# Patient Record
Sex: FEMALE | Race: BLACK OR AFRICAN AMERICAN | Employment: FULL TIME | URBAN - METROPOLITAN AREA
[De-identification: names, ages, dates, MRNs, and addresses within clinical notes are randomized per-mention and may not be internally consistent; named-entity substitution may affect disease eponyms.]

---

## 2018-06-15 ENCOUNTER — APPOINTMENT (OUTPATIENT)
Dept: GENERAL RADIOLOGY | Facility: CLINIC | Age: 23
End: 2018-06-15
Attending: EMERGENCY MEDICINE

## 2018-06-15 ENCOUNTER — APPOINTMENT (OUTPATIENT)
Dept: CT IMAGING | Facility: CLINIC | Age: 23
End: 2018-06-15
Attending: EMERGENCY MEDICINE

## 2018-06-15 ENCOUNTER — HOSPITAL ENCOUNTER (EMERGENCY)
Facility: CLINIC | Age: 23
Discharge: HOME | End: 2018-06-15
Attending: EMERGENCY MEDICINE | Admitting: EMERGENCY MEDICINE

## 2018-06-15 VITALS
DIASTOLIC BLOOD PRESSURE: 94 MMHG | TEMPERATURE: 98.5 F | SYSTOLIC BLOOD PRESSURE: 112 MMHG | HEART RATE: 105 BPM | RESPIRATION RATE: 20 BRPM | OXYGEN SATURATION: 98 %

## 2018-06-15 DIAGNOSIS — S09.90XA CLOSED HEAD INJURY, INITIAL ENCOUNTER: ICD-10-CM
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
## 2018-06-15 DIAGNOSIS — S61.259A HUMAN BITE OF FINGER, INITIAL ENCOUNTER: ICD-10-CM

## 2018-06-15 DIAGNOSIS — S00.11XA PERIORBITAL ECCHYMOSIS, RIGHT, INITIAL ENCOUNTER: ICD-10-CM

## 2018-06-15 DIAGNOSIS — S06.0X0A CONCUSSION WITHOUT LOSS OF CONSCIOUSNESS, INITIAL ENCOUNTER: ICD-10-CM
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
## 2018-06-15 LAB
ALBUMIN UR-MCNC: NEGATIVE MG/DL
APPEARANCE UR: CLEAR
BILIRUB UR QL STRIP: NEGATIVE
COLOR UR AUTO: YELLOW
GLUCOSE UR STRIP-MCNC: NEGATIVE MG/DL
HCG UR QL: NEGATIVE
HGB UR QL STRIP: NEGATIVE
KETONES UR STRIP-MCNC: NEGATIVE MG/DL
LEUKOCYTE ESTERASE UR QL STRIP: NEGATIVE
MUCOUS THREADS #/AREA URNS LPF: PRESENT /LPF
NITRATE UR QL: NEGATIVE
PH UR STRIP: 5 PH (ref 5–7)
RBC #/AREA URNS AUTO: <1 /HPF (ref 0–2)
SOURCE: ABNORMAL
SP GR UR STRIP: 1.02 (ref 1–1.03)
SQUAMOUS #/AREA URNS AUTO: 2 /HPF (ref 0–1)
UROBILINOGEN UR STRIP-MCNC: 2 MG/DL (ref 0–2)
WBC #/AREA URNS AUTO: 1 /HPF (ref 0–5)

## 2018-06-15 PROCEDURE — 25000128 H RX IP 250 OP 636: Performed by: EMERGENCY MEDICINE

## 2018-06-15 PROCEDURE — 70450 CT HEAD/BRAIN W/O DYE: CPT | Mod: XS

## 2018-06-15 PROCEDURE — 90715 TDAP VACCINE 7 YRS/> IM: CPT | Performed by: EMERGENCY MEDICINE

## 2018-06-15 PROCEDURE — 25000132 ZZH RX MED GY IP 250 OP 250 PS 637: Performed by: EMERGENCY MEDICINE

## 2018-06-15 PROCEDURE — 90471 IMMUNIZATION ADMIN: CPT

## 2018-06-15 PROCEDURE — 81001 URINALYSIS AUTO W/SCOPE: CPT | Performed by: EMERGENCY MEDICINE

## 2018-06-15 PROCEDURE — 70486 CT MAXILLOFACIAL W/O DYE: CPT

## 2018-06-15 PROCEDURE — 71046 X-RAY EXAM CHEST 2 VIEWS: CPT

## 2018-06-15 PROCEDURE — 81025 URINE PREGNANCY TEST: CPT | Performed by: EMERGENCY MEDICINE

## 2018-06-15 PROCEDURE — 99285 EMERGENCY DEPT VISIT HI MDM: CPT | Mod: 25

## 2018-06-15 RX ORDER — IBUPROFEN 600 MG/1
600 TABLET, FILM COATED ORAL ONCE
Status: COMPLETED | OUTPATIENT
Start: 2018-06-15 | End: 2018-06-15

## 2018-06-15 RX ORDER — ACETAMINOPHEN 325 MG/1
650 TABLET ORAL ONCE
Status: COMPLETED | OUTPATIENT
Start: 2018-06-15 | End: 2018-06-15

## 2018-06-15 RX ADMIN — CLOSTRIDIUM TETANI TOXOID ANTIGEN (FORMALDEHYDE INACTIVATED), CORYNEBACTERIUM DIPHTHERIAE TOXOID ANTIGEN (FORMALDEHYDE INACTIVATED), BORDETELLA PERTUSSIS TOXOID ANTIGEN (GLUTARALDEHYDE INACTIVATED), BORDETELLA PERTUSSIS FILAMENTOUS HEMAGGLUTININ ANTIGEN (FORMALDEHYDE INACTIVATED), BORDETELLA PERTUSSIS PERTACTIN ANTIGEN, AND BORDETELLA PERTUSSIS FIMBRIAE 2/3 ANTIGEN 0.5 ML: 5; 2; 2.5; 5; 3; 5 INJECTION, SUSPENSION INTRAMUSCULAR at 18:06

## 2018-06-15 RX ADMIN — IBUPROFEN 600 MG: 600 TABLET ORAL at 15:50

## 2018-06-15 RX ADMIN — ACETAMINOPHEN 650 MG: 325 TABLET, FILM COATED ORAL at 15:50

## 2018-06-15 ASSESSMENT — ENCOUNTER SYMPTOMS
ROS SKIN COMMENTS: ECCHYMOSIS
WOUND: 1
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
## 2018-06-15 NOTE — DISCHARGE INSTRUCTIONS
Discharge Instructions  Head Injury    You have been seen today for a head injury. Your evaluation included a history and physical examination. You may have had a CT (CAT) scan performed, though most head injuries do not require a scan. Based on this evaluation, your provider today does not feel that your head injury is serious.    Generally, every Emergency Department visit should have a follow-up clinic visit with either a primary or a specialty clinic/provider. Please follow-up as instructed by your emergency provider today.  Return to the Emergency Department if:    You are confused or you are not acting right.    Your headache gets worse or you start to have a really bad headache even with your recommended treatment plan.    You vomit (throw up) more than once.    You have a seizure.    You have trouble walking.    You have weakness or paralysis (cannot move) in an arm or a leg.    You have blood or fluid coming from your ears or nose.    You have new symptoms or anything that worries you.    Sleeping:  It is okay for you to sleep, but someone should wake you up if instructed by your provider, and someone should check on you at your usual time to wake up.     Activity:    Do not drive for at least 24 hours.    Do not drive if you have dizzy spells or trouble concentrating, or remembering things.    Do not return to any contact sports until cleared by your regular provider.     MORE INFORMATION:    Concussion:  A concussion is a minor head injury that may cause temporary problems with the way the brain works. Although concussions are important, they are generally not an emergency or a reason that a person needs to be hospitalized. Some concussion symptoms include confusion, amnesia (forgetful), nausea (sick to your stomach) and vomiting (throwing up), dizziness, fatigue, memory or concentration problems, irritability and sleep problems. For most people, concussions are mild and temporary but some will have more  severe and persistent symptoms that require on-going care and treatment.  CT Scans: Your evaluation today may have included a CT scan (CAT scan) to look for things like bleeding or a skull fracture (broken bone).  CT scans involve radiation and too many CT scans can cause serious health problems like cancer, especially in children.  Because of this, your provider may not have ordered a CT scan today if they think you are at low risk for a serious or life threatening problem.    If you were given a prescription for medicine here today, be sure to read all of the information (including the package insert) that comes with your prescription.  This will include important information about the medicine, its side effects, and any warnings that you need to know about.  The pharmacist who fills the prescription can provide more information and answer questions you may have about the medicine.  If you have questions or concerns that the pharmacist cannot address, please call or return to the Emergency Department.     Remember that you can always come back to the Emergency Department if you are not able to see your regular provider in the amount of time listed above, if you get any new symptoms, or if there is anything that worries you.    Discharge Instructions  Concussion    You were seen today for signs of a concussion.  The symptoms will vary, depending on the nature of your injury and your health. You may have: headache, confusion, nausea (feel sick to your stomach), vomiting (throwing up) and problems with memory, concentrating, or sleep. You may feel dizzy, irritable, and tired. Children and teens may need help from their parents, teachers, and coaches to watch for symptoms as they recover.    Generally, every Emergency Department visit should have a follow-up clinic visit with either a primary or a specialty clinic/provider. Please follow-up as instructed by your emergency provider today.     Return to the Emergency  Department if:    Your headache gets worse or you start to have a really bad headache even with the recommended treatment plan.     You feel drowsier, have growing confusion, or slurred speech.     You keep repeating yourself.     You have strange behavior or are feeling more irritable.     You have a seizure.     You vomit (throw up) more than once.     You have trouble walking.     You have weakness or numbness.    Your neck pain gets worse.     You have a loss of consciousness.     You have blood for fluid coming from your ears or nose.     You have new symptoms or anything that worries you.     Home Care:    Get lots of rest and get enough sleep at night. Take daytime naps or rest if you feel tired.     Limit physical activity and  thinking  activities. These can make symptoms worse.   o Physical activities include gym, sports, weight training, running, exercise, and heavy lifting.   o Thinking activities include homework, class work, job-related work, and screen time (phone, computer, tablet, TV, and video games).     Stick to a healthy diet and drink lots of fluids. Avoid alcohol.    As symptoms improve, you may slowly return to your daily activities. If symptoms get worse or return, reduce your activity.     Know that it is normal to feel sad or frustrated when you do not feel right and are less active.     Going Back to Work:    Your care team will tell you when you are ready to return to work.      Limit the amount of work you do soon after your injury. This may speed healing. Take breaks if your symptoms get worse. You should also reduce your physical activity as well as activities that require a lot of thinking until you see your doctor. You may need shorter work days and a lighter workload.  Avoid heavy lifting, working with machinery, driving and working at heights until your symptoms are gone or you are cleared by a provider.    Going Back to School:    If you are still having symptoms, you may need  extra help at school.    Tell your teachers and school nurse about your injury and symptoms. Ask them to watch for problems with learning, memory, and concentrating. Symptoms may get worse when you do schoolwork, and you may become more irritable. You may need shorter school days, a reduced workload, and to postpone testing.  Do not drive or take gym class (physical activity) until cleared by a provider.    Returning to Sports:    Never return to play if you have any symptoms. A full recovery will reduce the chances of getting hurt again. Remember, it is better to miss one or two games than a whole season.    You should rest from all physical activity until you see your provider. Generally, if all symptoms have completely cleared, your provider can help guide you to slowly return to sports. If symptoms return or worsen, stop the activity and see your provider.    Important: If you are in an organized sport and under age 18, you will need written consent from a healthcare provider before you return to sports. Typically, this will be your primary care or sports medicine provider. Please make an appointment.    If you were given a prescription for medicine here today, be sure to read all of the information (including the package insert) that comes with your prescription.  This will include important information about the medicine, its side effects, and any warnings that you need to know about.  The pharmacist who fills the prescription can provide more information and answer questions you may have about the medicine.  If you have questions or concerns that the pharmacist cannot address, please call or return to the Emergency Department.     Remember that you can always come back to the Emergency Department if you are not able to see your regular provider in the amount of time listed above, if you get any new symptoms, or if there is anything that worries you.
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
## 2018-06-15 NOTE — ED AVS SNAPSHOT
[facility]    [address]    [facility] [address]    Phone:  [phone]    Fax:  [phone]                                       Darya Hamilton   MRN: [id number]    Department:  [facility] Emergency [facility]   Date of Visit:  6/15/2018           After Visit Summary Signature Page     I have received my discharge instructions, and my questions have been answered. I have discussed any challenges I see with this plan with the nurse or doctor.    ..........................................................................................................................................  Patient/Patient Representative Signature      ..........................................................................................................................................  Patient Representative Print Name and Relationship to Patient    ..................................................               ................................................  Date                                            Time    ..........................................................................................................................................  Reviewed by Signature/Title    ...................................................              ..............................................  Date                                                            Time

## 2018-06-25 PROCEDURE — 99283 EMERGENCY DEPT VISIT LOW MDM: CPT

## 2018-06-26 ENCOUNTER — HOSPITAL ENCOUNTER (EMERGENCY)
Facility: CLINIC | Age: 23
Discharge: HOME | End: 2018-06-26
Attending: EMERGENCY MEDICINE | Admitting: EMERGENCY MEDICINE

## 2018-06-26 VITALS
DIASTOLIC BLOOD PRESSURE: 83 MMHG | WEIGHT: 130 LBS | SYSTOLIC BLOOD PRESSURE: 119 MMHG | HEIGHT: 62 IN | OXYGEN SATURATION: 98 % | RESPIRATION RATE: 16 BRPM | TEMPERATURE: 98.6 F | BODY MASS INDEX: 23.92 KG/M2

## 2018-06-26 DIAGNOSIS — K62.9 PERIANAL LESION: ICD-10-CM

## 2018-06-26 LAB
ABO + RH BLD: NORMAL
ABO + RH BLD: NORMAL
ANION GAP SERPL CALCULATED.3IONS-SCNC: 10 MMOL/L (ref 3–14)
BASOPHILS # BLD AUTO: 0.1 10E9/L (ref 0–0.2)
BASOPHILS NFR BLD AUTO: 1.6 %
BLD GP AB SCN SERPL QL: NORMAL
BLOOD BANK CMNT PATIENT-IMP: NORMAL
BUN SERPL-MCNC: 11 MG/DL (ref 7–30)
CALCIUM SERPL-MCNC: 8.4 MG/DL (ref 8.5–10.1)
CHLORIDE SERPL-SCNC: 110 MMOL/L (ref 94–109)
CO2 SERPL-SCNC: 24 MMOL/L (ref 20–32)
CREAT SERPL-MCNC: 0.64 MG/DL (ref 0.52–1.04)
DIFFERENTIAL METHOD BLD: ABNORMAL
EOSINOPHIL # BLD AUTO: 0 10E9/L (ref 0–0.7)
EOSINOPHIL NFR BLD AUTO: 0.8 %
ERYTHROCYTE [DISTWIDTH] IN BLOOD BY AUTOMATED COUNT: 13.2 % (ref 10–15)
GFR SERPL CREATININE-BSD FRML MDRD: >90 ML/MIN/1.7M2
GLUCOSE SERPL-MCNC: 86 MG/DL (ref 70–99)
HCT VFR BLD AUTO: 44 % (ref 35–47)
HEMOCCULT STL QL: NEGATIVE
HGB BLD-MCNC: 15.1 G/DL (ref 11.7–15.7)
IMM GRANULOCYTES # BLD: 0 10E9/L (ref 0–0.4)
IMM GRANULOCYTES NFR BLD: 0 %
LYMPHOCYTES # BLD AUTO: 2 10E9/L (ref 0.8–5.3)
LYMPHOCYTES NFR BLD AUTO: 52.4 %
MCH RBC QN AUTO: 32.7 PG (ref 26.5–33)
MCHC RBC AUTO-ENTMCNC: 34.3 G/DL (ref 31.5–36.5)
MCV RBC AUTO: 95 FL (ref 78–100)
MONOCYTES # BLD AUTO: 0.3 10E9/L (ref 0–1.3)
MONOCYTES NFR BLD AUTO: 8.5 %
NEUTROPHILS # BLD AUTO: 1.4 10E9/L (ref 1.6–8.3)
NEUTROPHILS NFR BLD AUTO: 36.7 %
NRBC # BLD AUTO: 0 10*3/UL
NRBC BLD AUTO-RTO: 0 /100
PLATELET # BLD AUTO: 290 10E9/L (ref 150–450)
POTASSIUM SERPL-SCNC: 3.4 MMOL/L (ref 3.4–5.3)
RBC # BLD AUTO: 4.62 10E12/L (ref 3.8–5.2)
SODIUM SERPL-SCNC: 144 MMOL/L (ref 133–144)
SPECIMEN EXP DATE BLD: NORMAL
WBC # BLD AUTO: 3.8 10E9/L (ref 4–11)

## 2018-06-26 PROCEDURE — 85025 COMPLETE CBC W/AUTO DIFF WBC: CPT | Performed by: EMERGENCY MEDICINE

## 2018-06-26 PROCEDURE — 80048 BASIC METABOLIC PNL TOTAL CA: CPT | Performed by: EMERGENCY MEDICINE

## 2018-06-26 PROCEDURE — 86850 RBC ANTIBODY SCREEN: CPT | Performed by: EMERGENCY MEDICINE

## 2018-06-26 PROCEDURE — 86901 BLOOD TYPING SEROLOGIC RH(D): CPT | Performed by: EMERGENCY MEDICINE

## 2018-06-26 PROCEDURE — 82272 OCCULT BLD FECES 1-3 TESTS: CPT | Performed by: EMERGENCY MEDICINE

## 2018-06-26 PROCEDURE — 86900 BLOOD TYPING SEROLOGIC ABO: CPT | Performed by: EMERGENCY MEDICINE

## 2018-06-26 ASSESSMENT — ENCOUNTER SYMPTOMS
FLANK PAIN: 1
APPETITE CHANGE: 1
DIARRHEA: 1
ANAL BLEEDING: 1
BLOOD IN STOOL: 1
VOMITING: 1

## 2018-06-26 NOTE — ED AVS SNAPSHOT
[facility] Department    [address]    [facility] [address]    Phone:  [phone]    Fax:  [phone]                                       Darya Hamilton   MRN: [id number]    Department:  [facility] Emergency Department   Date of Visit:  6/25/2018           After Visit Summary Signature Page     I have received my discharge instructions, and my questions have been answered. I have discussed any challenges I see with this plan with the nurse or doctor.    ..........................................................................................................................................  Patient/Patient Representative Signature      ..........................................................................................................................................  Patient Representative Print Name and Relationship to Patient    ..................................................               ................................................  Date                                            Time    ..........................................................................................................................................  Reviewed by Signature/Title    ...................................................              ..............................................  Date                                                            Time
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
## 2018-06-26 NOTE — ED AVS SNAPSHOT
[facility] Emergency Department    [address]    [facility] [address]    Phone:  [phone]    Fax:  [phone]                                       Darya Hamilton   MRN: [id number]    Department:  [facility] Emergency Department   Date of Visit:  6/25/2018           Patient Information     Date Of Birth          1995        Your diagnoses for this visit were:     Perianal lesion likely genital warts       You were seen by Julissa Rebollar MD.      Follow-up Information     Schedule an appointment as soon as possible for a visit with Alexi England MD.    Specialty:  Colon and Rectal Surgery    Contact information:    [facility]  [address] [place] [address] [phone]          Follow up with [facility] Emergency Department.    Specialty:  EMERGENCY MEDICINE    Why:  If symptoms worsen    Contact information:    [address] [facility] [address] [phone]      Discharge References/Attachments     GENITAL WARTS (CONDYLOMA) (ENGLISH)    RECTAL BLEEDING, UNDERSTANDING (ENGLISH)      24 Hour Appointment Hotline       To make an appointment at any [facility] call [phone]). If you don't have a family doctor or clinic, we will help you find one. [place] clinics are conveniently located to serve the needs of you and your family.             Review of your medicines      Notice     You have not been prescribed any medications.            Procedures and tests performed during your visit     ABO/Rh type and screen    Basic metabolic panel    CBC + differential    Stool: occult blood      Orders Needing Specimen Collection     None      Pending Results     No orders found from 6/24/2018 to 6/27/2018.            Pending Culture Results     No orders found from 6/24/2018 to 6/27/2018.            Pending Results Instructions     If you had any lab results that were  not finalized at the time of your Discharge, you can call the ED Lab Result RN at [phone]. You will be contacted by this team for any positive Lab results or changes in treatment. The nurses are available 7 days a week from 10A to 6:30P.  You can leave a message 24 hours per day and they will return your call.        Test Results From Your Hospital Stay        6/26/2018  1:43 AM      Component Results     Component Value Ref Range & Units Status    WBC 3.8 (L) 4.0 - 11.0 10e9/L Final    RBC Count 4.62 3.8 - 5.2 10e12/L Final    Hemoglobin 15.1 11.7 - 15.7 g/dL Final    Hematocrit 44.0 35.0 - 47.0 % Final    MCV 95 78 - 100 fl Final    MCH 32.7 26.5 - 33.0 pg Final    MCHC 34.3 31.5 - 36.5 g/dL Final    RDW 13.2 10.0 - 15.0 % Final    Platelet Count 290 150 - 450 10e9/L Final    Diff Method Automated Method  Final    % Neutrophils 36.7 % Final    % Lymphocytes 52.4 % Final    % Monocytes 8.5 % Final    % Eosinophils 0.8 % Final    % Basophils 1.6 % Final    % Immature Granulocytes 0.0 % Final    Nucleated RBCs 0 0 /100 Final    Absolute Neutrophil 1.4 (L) 1.6 - 8.3 10e9/L Final    Absolute Lymphocytes 2.0 0.8 - 5.3 10e9/L Final    Absolute Monocytes 0.3 0.0 - 1.3 10e9/L Final    Absolute Eosinophils 0.0 0.0 - 0.7 10e9/L Final    Absolute Basophils 0.1 0.0 - 0.2 10e9/L Final    Abs Immature Granulocytes 0.0 0 - 0.4 10e9/L Final    Absolute Nucleated RBC 0.0  Final         6/26/2018  1:59 AM      Component Results     Component Value Ref Range & Units Status    Sodium 144 133 - 144 mmol/L Final    Potassium 3.4 3.4 - 5.3 mmol/L Final    Chloride 110 (H) 94 - 109 mmol/L Final    Carbon Dioxide 24 20 - 32 mmol/L Final    Anion Gap 10 3 - 14 mmol/L Final    Glucose 86 70 - 99 mg/dL Final    Urea Nitrogen 11 7 - 30 mg/dL Final    Creatinine 0.64 0.52 - 1.04 mg/dL Final    GFR Estimate >90 >60 mL/min/1.7m2 Final    Non  GFR Calc    GFR Estimate If Black >90 >60 mL/min/1.7m2 Final    African American GFR  Calc    Calcium 8.4 (L) 8.5 - 10.1 mg/dL Final         6/26/2018  2:27 AM      Component Results     Component Value Ref Range & Units Status    ABO B  Final    RH(D) Pos  Final    Antibody Screen Neg  Final    Test Valid Only At [facility]     Final    Specimen Expires 06/29/2018  Final         6/26/2018  1:42 AM      Component Results     Component Value Ref Range & Units Status    Occult Blood Negative NEG^Negative Final                Clinical Quality Measure: Blood Pressure Screening     Your blood pressure was checked while you were in the emergency department today. The last reading we obtained was  BP: 119/83 . Please read the guidelines below about what these numbers mean and what you should do about them.  If your systolic blood pressure (the top number) is less than 120 and your diastolic blood pressure (the bottom number) is less than 80, then your blood pressure is normal. There is nothing more that you need to do about it.  If your systolic blood pressure (the top number) is 120-139 or your diastolic blood pressure (the bottom number) is 80-89, your blood pressure may be higher than it should be. You should have your blood pressure rechecked within a year by a primary care provider.  If your systolic blood pressure (the top number) is 140 or greater or your diastolic blood pressure (the bottom number) is 90 or greater, you may have high blood pressure. High blood pressure is treatable, but if left untreated over time it can put you at risk for heart attack, stroke, or kidney failure. You should have your blood pressure rechecked by a primary care provider within the next 4 weeks.  If your provider in the emergency department today gave you specific instructions to follow-up with your doctor or provider even sooner than that, you should follow that instruction and not wait for up to 4 weeks for your follow-up visit.        Thank you for choosing [place]       Thank you for choosing  "[facility] for your care. Our goal is always to provide you with excellent care. Hearing back from our patients is one way we can continue to improve our services. Please take a few minutes to complete the written survey that you may receive in the mail after you visit with us. Thank you!        [employer] Information     [employer] lets you send messages to your doctor, view your test results, renew your prescriptions, schedule appointments and more. To sign up, go to [email or web address] . Click on \"Log in\" on the left side of the screen, which will take you to the Welcome page. Then click on \"Sign up Now\" on the right side of the page.     You will be asked to enter the access code listed below, as well as some personal information. Please follow the directions to create your username and password.     Your access code is: [email or web address]  Expires: 2018  5:50 PM     Your access code will  in 90 days. If you need help or a new code, please call your [facility] clinic or [phone].        Care EveryWhere ID     This is your Care EveryWhere ID. This could be used by other organizations to access your [facility] medical records  [id number]        Equal Access to Services     MILE FORRESTER : Pablo Calle, rubio sanches, qameggan kaalleslie coon, giuseppe kendrick. So [facility] [phone].    ATENCIÓN: Si habla español, tiene a mayorga disposición servicios gratuitos de asistencia lingüística. Llame al [phone].    We comply with applicable federal civil rights laws and [place] laws. We do not discriminate on the basis of race, color, national origin, age, disability, sex, sexual orientation, or gender identity.            After Visit Summary       This is your record. Keep this with you and show to your community pharmacist(s) and doctor(s) at your next visit.                  "

## 2019-04-16 ENCOUNTER — APPOINTMENT (OUTPATIENT)
Dept: CT IMAGING | Facility: CLINIC | Age: 24
End: 2019-04-16
Attending: EMERGENCY MEDICINE

## 2019-04-16 ENCOUNTER — HOSPITAL ENCOUNTER (EMERGENCY)
Facility: CLINIC | Age: 24
Discharge: HOME | End: 2019-04-16
Attending: EMERGENCY MEDICINE | Admitting: EMERGENCY MEDICINE

## 2019-04-16 VITALS
SYSTOLIC BLOOD PRESSURE: 138 MMHG | BODY MASS INDEX: 22.15 KG/M2 | OXYGEN SATURATION: 99 % | DIASTOLIC BLOOD PRESSURE: 118 MMHG | TEMPERATURE: 98.6 F | HEIGHT: 63 IN | WEIGHT: 125 LBS | RESPIRATION RATE: 20 BRPM

## 2019-04-16 DIAGNOSIS — R41.3 LOSS OF MEMORY: ICD-10-CM

## 2019-04-16 DIAGNOSIS — R41.3 TRANSIENT AMNESIA: ICD-10-CM

## 2019-04-16 LAB
ANION GAP SERPL CALCULATED.3IONS-SCNC: 9 MMOL/L (ref 3–14)
BASOPHILS # BLD AUTO: 0 10E9/L (ref 0–0.2)
BASOPHILS NFR BLD AUTO: 1 %
BUN SERPL-MCNC: 7 MG/DL (ref 7–30)
CALCIUM SERPL-MCNC: 8.7 MG/DL (ref 8.5–10.1)
CHLORIDE SERPL-SCNC: 104 MMOL/L (ref 94–109)
CO2 SERPL-SCNC: 28 MMOL/L (ref 20–32)
CREAT SERPL-MCNC: 0.47 MG/DL (ref 0.52–1.04)
DIFFERENTIAL METHOD BLD: ABNORMAL
EOSINOPHIL # BLD AUTO: 0 10E9/L (ref 0–0.7)
EOSINOPHIL NFR BLD AUTO: 0.7 %
ERYTHROCYTE [DISTWIDTH] IN BLOOD BY AUTOMATED COUNT: 13.5 % (ref 10–15)
GFR SERPL CREATININE-BSD FRML MDRD: >90 ML/MIN/{1.73_M2}
GLUCOSE SERPL-MCNC: 105 MG/DL (ref 70–99)
HCG SERPL QL: NEGATIVE
HCT VFR BLD AUTO: 41.7 % (ref 35–47)
HGB BLD-MCNC: 14.5 G/DL (ref 11.7–15.7)
IMM GRANULOCYTES # BLD: 0 10E9/L (ref 0–0.4)
IMM GRANULOCYTES NFR BLD: 0.7 %
INTERPRETATION ECG - MUSE: NORMAL
LYMPHOCYTES # BLD AUTO: 0.8 10E9/L (ref 0.8–5.3)
LYMPHOCYTES NFR BLD AUTO: 26.4 %
MCH RBC QN AUTO: 34.7 PG (ref 26.5–33)
MCHC RBC AUTO-ENTMCNC: 34.8 G/DL (ref 31.5–36.5)
MCV RBC AUTO: 100 FL (ref 78–100)
MONOCYTES # BLD AUTO: 0.2 10E9/L (ref 0–1.3)
MONOCYTES NFR BLD AUTO: 6.5 %
NEUTROPHILS # BLD AUTO: 2 10E9/L (ref 1.6–8.3)
NEUTROPHILS NFR BLD AUTO: 64.7 %
NRBC # BLD AUTO: 0 10*3/UL
NRBC BLD AUTO-RTO: 0 /100
PLATELET # BLD AUTO: 209 10E9/L (ref 150–450)
POTASSIUM SERPL-SCNC: 3.2 MMOL/L (ref 3.4–5.3)
RBC # BLD AUTO: 4.18 10E12/L (ref 3.8–5.2)
SODIUM SERPL-SCNC: 141 MMOL/L (ref 133–144)
WBC # BLD AUTO: 3.1 10E9/L (ref 4–11)

## 2019-04-16 PROCEDURE — 93005 ELECTROCARDIOGRAM TRACING: CPT

## 2019-04-16 PROCEDURE — 84703 CHORIONIC GONADOTROPIN ASSAY: CPT | Performed by: EMERGENCY MEDICINE

## 2019-04-16 PROCEDURE — 70450 CT HEAD/BRAIN W/O DYE: CPT

## 2019-04-16 PROCEDURE — 99285 EMERGENCY DEPT VISIT HI MDM: CPT | Mod: 25

## 2019-04-16 PROCEDURE — 85025 COMPLETE CBC W/AUTO DIFF WBC: CPT | Performed by: EMERGENCY MEDICINE

## 2019-04-16 PROCEDURE — 80048 BASIC METABOLIC PNL TOTAL CA: CPT | Performed by: EMERGENCY MEDICINE

## 2019-04-16 ASSESSMENT — ENCOUNTER SYMPTOMS
PALPITATIONS: 0
HEADACHES: 0
FEVER: 0
SHORTNESS OF BREATH: 0
SLEEP DISTURBANCE: 1
LIGHT-HEADEDNESS: 1
COUGH: 0
DECREASED CONCENTRATION: 1
HALLUCINATIONS: 0
CHILLS: 0
SEIZURES: 0
ABDOMINAL PAIN: 0
VOMITING: 0
NAUSEA: 0
CONFUSION: 1
DIARRHEA: 0

## 2019-04-16 ASSESSMENT — MIFFLIN-ST. JEOR: SCORE: 1281.13

## 2019-04-16 NOTE — ED AVS SNAPSHOT
Emergency Department  [address] [place] [address]  Phone:  [phone]  Fax:  [phone]                                    Darya Hamilton   MRN: [id number]    Department:   Emergency Department   Date of Visit:  4/16/2019           After Visit Summary Signature Page    I have received my discharge instructions, and my questions have been answered. I have discussed any challenges I see with this plan with the nurse or doctor.    ..........................................................................................................................................  Patient/Patient Representative Signature      ..........................................................................................................................................  Patient Representative Print Name and Relationship to Patient    ..................................................               ................................................  Date                                   Time    ..........................................................................................................................................  Reviewed by Signature/Title    ...................................................              ..............................................  Date                                               Time          22EPIC Rev 08/18
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
## 2019-04-17 NOTE — ED NOTES
Bed: ED17  Expected date: 4/16/19  Expected time: 9:18 PM  Means of arrival: Ambulance  Comments:  Grace [address] 23F alt mental status

## 2019-08-03 ENCOUNTER — HOSPITAL ENCOUNTER (EMERGENCY)
Facility: CLINIC | Age: 24
Discharge: HOME | End: 2019-08-03
Attending: PHYSICIAN ASSISTANT | Admitting: PHYSICIAN ASSISTANT

## 2019-08-03 ENCOUNTER — APPOINTMENT (OUTPATIENT)
Dept: GENERAL RADIOLOGY | Facility: CLINIC | Age: 24
End: 2019-08-03
Attending: PHYSICIAN ASSISTANT

## 2019-08-03 VITALS
TEMPERATURE: 98.2 F | HEART RATE: 98 BPM | DIASTOLIC BLOOD PRESSURE: 70 MMHG | OXYGEN SATURATION: 98 % | SYSTOLIC BLOOD PRESSURE: 112 MMHG | RESPIRATION RATE: 20 BRPM

## 2019-08-03 DIAGNOSIS — S91.212A LACERATION OF LEFT GREAT TOE WITHOUT FOREIGN BODY WITH DAMAGE TO NAIL, INITIAL ENCOUNTER: ICD-10-CM

## 2019-08-03 PROCEDURE — 73660 X-RAY EXAM OF TOE(S): CPT | Mod: LT

## 2019-08-03 PROCEDURE — 99213 OFFICE O/P EST LOW 20 MIN: CPT | Mod: 25 | Performed by: PHYSICIAN ASSISTANT

## 2019-08-03 PROCEDURE — 12001 RPR S/N/AX/GEN/TRNK 2.5CM/<: CPT | Performed by: PHYSICIAN ASSISTANT

## 2019-08-03 PROCEDURE — 12001 RPR S/N/AX/GEN/TRNK 2.5CM/<: CPT | Mod: Z6 | Performed by: PHYSICIAN ASSISTANT

## 2019-08-03 PROCEDURE — G0463 HOSPITAL OUTPT CLINIC VISIT: HCPCS | Performed by: PHYSICIAN ASSISTANT

## 2019-08-03 NOTE — ED AVS SNAPSHOT
[facility] Emergency Department  [address] [facility] [place] [address]  Phone:  [phone]  Fax:  [phone]                                    Darya Hamilton   MRN: [id number]    Department:  [facility] Emergency Department   Date of Visit:  8/3/2019           After Visit Summary Signature Page    I have received my discharge instructions, and my questions have been answered. I have discussed any challenges I see with this plan with the nurse or doctor.    ..........................................................................................................................................  Patient/Patient Representative Signature      ..........................................................................................................................................  Patient Representative Print Name and Relationship to Patient    ..................................................               ................................................  Date                                   Time    ..........................................................................................................................................  Reviewed by Signature/Title    ...................................................              ..............................................  Date                                               Time          22EPIC Rev 08/18

## 2019-08-03 NOTE — ED PROVIDER NOTES
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  She complains of mild to moderate pain 4/10 on the pain scale.  She denies any concern for foreign body embedded in the wound.  No distal numbness or paresthesias.  Her tetanus vaccine is up-to-date per epic records    Allergies:  Allergies   Allergen Reactions     Asa [Aspirin]      Hydrocodone      Problem List:    There are no active problems to display for this patient.     Past Medical History:    Past Medical History:   Diagnosis Date     Hodgkin's lymphoma (H)      Past Surgical History:    Past Surgical History:   Procedure Laterality Date     CHOLECYSTECTOMY         Family History:    No family history on file.    Social History:  Marital Status:  Single [1]  Social History     Tobacco Use     Smoking status: Never Smoker     Smokeless tobacco: Never Used   Substance Use Topics     Alcohol use: Yes     Comment: 1-2/week     Drug use: No        Medications:      No current outpatient medications on file.    Review of Systems  CONSTITUTIONAL:NEGATIVE for fever, chills, change in weight  INTEGUMENTARY/SKIN: POSITIVE for laceration to left great toe   RESP:NEGATIVE for significant cough or SOB  MUSCULOSKELETAL: POSITIVE  for mild to moderate left great toe pain and NEGATIVE for other concerning arthralgias or myalgias   NEURO: NEGATIVE for numbness, paresthesias, weakness   Physical Exam   BP: 112/70  Pulse: 98  Temp: 98.2  F (36.8  C)  Resp: 20  SpO2: 98 %  Physical Exam   Constitutional: She is oriented to person, place, and time. She appears well-developed and well-nourished. No distress.   Musculoskeletal:        Left ankle: Normal.        Left foot: There is tenderness and  laceration. There is normal range of motion, no swelling, no crepitus and no deformity.        Feet:    1.5 cm in length superficial C shaped flap laceration which does affect very distal tip of nailbed, no active bleeding.  Skin of the flap portion of lesion is white/blanched.    Neurological: She is alert and oriented to person, place, and time. No sensory deficit.   Skin: Skin is warm and dry. Laceration noted. No abrasion, no bruising, no ecchymosis and no rash noted. No erythema.     ED Course        [facility]    Laceration repair  Date/Time: 8/3/2019 4:02 PM  Performed by: Lorna Crabtree PA-C  Authorized by: Lorna Crabtree PA-C     ED EVALUATION:      Assessment Time: 8/3/2019 3:50 PM      I have performed an Emergency Department Evaluation including taking a history and physical examination, this evaluation will be documented in the electronic medical record for this ED encounter.      Provisional Diagnosis: great toe laceration  UNIVERSAL PROTOCOL   Site Marked: NA  Prior Images Obtained and Reviewed:  NA  Required items: Required blood products, implants, devices and special equipment available    Patient identity confirmed:  Verbally with patient, arm band, provided demographic data and hospital-assigned identification number  NA - No sedation, light sedation, or local anesthesia  Confirmation Checklist:  Patient's identity using two indicators, relevant allergies, procedure was appropriate and matched the consent or emergent situation and correct equipment/implants were available  Time out: Immediately prior to the procedure a time out was called    Preparation: Patient was prepped and draped in usual sterile fashion      ANESTHESIA (see MAR for exact dosages):     Anesthesia method:  Local infiltration    Local anesthetic:  Lidocaine 1% w/o epi    SEDATION    Patient Sedated: No    LACERATION DETAILS     Location:  Toe    Toe location:  L big toe    Length (cm):  1.5     Depth (mm):  2    REPAIR TYPE:     Repair type:  Simple      EXPLORATION:     Hemostasis achieved with:  Direct pressure    Wound exploration: wound explored through full range of motion and entire depth of wound probed and visualized      Wound extent: no foreign body, no nerve damage, no tendon damage, no underlying fracture and no vascular damage      Contaminated: no      TREATMENT:     Area cleansed with:  Hibiclens    Amount of cleaning:  Standard    SKIN REPAIR     Repair method:  Sutures    Suture size:  5-0    Suture material:  Nylon    Suture technique:  Simple interrupted    Number of sutures:  2    APPROXIMATION     Approximation:  Close    POST-PROCEDURE DETAILS     Dressing:  Antibiotic ointment and tube gauze      PROCEDURE   Patient Tolerance:  Patient tolerated the procedure well with no immediate complications    Time of Sedation in Minutes by Physician:  0          Critical Care time:  none          Results for orders placed or performed during the hospital encounter of 08/03/19 (from the past 24 hour(s))   XR Toe Left G/E 2 Views    Narrative    Examination:  XR TOE LT G/E 2 VW    Date:  8/3/2019 3:43 PM     Clinical Information: Basketball-related trauma to the left great toe.    Additional Information: none    Comparison: none      Impression    Impression:  Soft tissue laceration at the tip of the great toe. No  fracture or joint malalignment. No radiopaque foreign body.    KENAN EAGLE MD     Medications - No data to display    Assessments & Plan (with Medical Decision Making)     I have reviewed the nursing notes.    I have reviewed the findings, diagnosis, plan and need for follow up with the patient.          Medication List      There are no discharge medications for this visit.       Final diagnoses:   Laceration of left great toe without foreign body with damage to nail, initial encounter     24-year-old female presents to the urgent care with concern of a laceration to her left  great toe which occurred just prior to arrival during a work-related injury.  She had stable vital signs upon arrival.  Physical exam findings as described above are benign.  As part of evaluation she did have x-ray of her toe which was negative for acute fracture, dislocation, radiopaque foreign body.  After discussing her/benefits, patient reluctantly agreed to proceed with sutures.  She tolerated placement of two 5-0 nylon sutures.  They did discuss with patient that given discoloration of the flap portion of the laceration this tissue is likely nonviable and will likely continue to disc, she will eventually fall off.  Patient states understanding.  Follow-up with primary care provider for suture removal in 3-5 days. Suture care instructions, signs of infection, worrisome reasons to return to ER/UC sooner discussed.     Disclaimer: This note consists of symbols derived from keyboarding, dictation, and/or voice recognition software. As a result, there may be errors in the script that have gone undetected.  Please consider this when interpreting information found in the chart.    8/3/2019   [facility] EMERGENCY DEPARTMENT     Lorna Crabtree PA-C  08/03/19 0322

## 2019-08-07 ENCOUNTER — HOSPITAL ENCOUNTER (EMERGENCY)
Facility: CLINIC | Age: 24
Discharge: HOME | End: 2019-08-07
Attending: PHYSICIAN ASSISTANT | Admitting: PHYSICIAN ASSISTANT

## 2019-08-07 VITALS
WEIGHT: 125 LBS | TEMPERATURE: 98.1 F | OXYGEN SATURATION: 97 % | DIASTOLIC BLOOD PRESSURE: 98 MMHG | BODY MASS INDEX: 22.14 KG/M2 | SYSTOLIC BLOOD PRESSURE: 140 MMHG

## 2019-08-07 DIAGNOSIS — Z51.89 VISIT FOR WOUND CHECK: ICD-10-CM

## 2019-08-07 PROCEDURE — 99214 OFFICE O/P EST MOD 30 MIN: CPT | Mod: Z6 | Performed by: PHYSICIAN ASSISTANT

## 2019-08-07 PROCEDURE — G0463 HOSPITAL OUTPT CLINIC VISIT: HCPCS | Performed by: PHYSICIAN ASSISTANT

## 2019-08-07 RX ORDER — CEPHALEXIN 500 MG/1
500 CAPSULE ORAL 4 TIMES DAILY
Qty: 28 CAPSULE | Refills: 0 | Status: SHIPPED | OUTPATIENT
Start: 2019-08-07 | End: 2019-08-14

## 2019-08-07 NOTE — ED AVS SNAPSHOT
[facility] Emergency Department  [address] [address]  Phone:  [phone]  Fax:  [phone]                                    Darya Hamilton   MRN: [id number]    Department:  [facility] Emergency Department   Date of Visit:  8/7/2019           After Visit Summary Signature Page    I have received my discharge instructions, and my questions have been answered. I have discussed any challenges I see with this plan with the nurse or doctor.    ..........................................................................................................................................  Patient/Patient Representative Signature      ..........................................................................................................................................  Patient Representative Print Name and Relationship to Patient    ..................................................               ................................................  Date                                   Time    ..........................................................................................................................................  Reviewed by Signature/Title    ...................................................              ..............................................  Date                                               Time          22EPIC Rev 08/18
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
## 2020-03-15 ENCOUNTER — HOSPITAL ENCOUNTER (EMERGENCY)
Facility: CLINIC | Age: 25
Discharge: HOME | End: 2020-03-15
Attending: EMERGENCY MEDICINE | Admitting: EMERGENCY MEDICINE

## 2020-03-15 VITALS
TEMPERATURE: 98.7 F | OXYGEN SATURATION: 96 % | HEART RATE: 94 BPM | SYSTOLIC BLOOD PRESSURE: 116 MMHG | DIASTOLIC BLOOD PRESSURE: 89 MMHG | RESPIRATION RATE: 28 BRPM

## 2020-03-15 DIAGNOSIS — G40.909 RECURRENT SEIZURES (H): ICD-10-CM

## 2020-03-15 DIAGNOSIS — R11.0 NAUSEA: ICD-10-CM

## 2020-03-15 DIAGNOSIS — R74.01 TRANSAMINITIS: ICD-10-CM

## 2020-03-15 PROBLEM — R56.9 SEIZURE (H): Status: ACTIVE | Noted: 2020-02-18

## 2020-03-15 PROBLEM — C81.90 HODGKIN'S LYMPHOMA (H): Status: ACTIVE | Noted: 2020-02-18

## 2020-03-15 LAB
ALBUMIN SERPL-MCNC: 4.5 G/DL (ref 3.4–5)
ALP SERPL-CCNC: 80 U/L (ref 40–150)
ALT SERPL W P-5'-P-CCNC: 114 U/L (ref 0–50)
ANION GAP SERPL CALCULATED.3IONS-SCNC: 11 MMOL/L (ref 3–14)
AST SERPL W P-5'-P-CCNC: 211 U/L (ref 0–45)
BASOPHILS # BLD AUTO: 0 10E9/L (ref 0–0.2)
BASOPHILS NFR BLD AUTO: 1.5 %
BILIRUB SERPL-MCNC: 1 MG/DL (ref 0.2–1.3)
BUN SERPL-MCNC: 14 MG/DL (ref 7–30)
CALCIUM SERPL-MCNC: 9.7 MG/DL (ref 8.5–10.1)
CHLORIDE SERPL-SCNC: 98 MMOL/L (ref 94–109)
CO2 SERPL-SCNC: 26 MMOL/L (ref 20–32)
CREAT SERPL-MCNC: 0.6 MG/DL (ref 0.52–1.04)
DIFFERENTIAL METHOD BLD: ABNORMAL
EOSINOPHIL # BLD AUTO: 0 10E9/L (ref 0–0.7)
EOSINOPHIL NFR BLD AUTO: 1.5 %
ERYTHROCYTE [DISTWIDTH] IN BLOOD BY AUTOMATED COUNT: 12.4 % (ref 10–15)
GFR SERPL CREATININE-BSD FRML MDRD: >90 ML/MIN/{1.73_M2}
GLUCOSE SERPL-MCNC: 145 MG/DL (ref 70–99)
HCG SERPL QL: NEGATIVE
HCT VFR BLD AUTO: 45.2 % (ref 35–47)
HGB BLD-MCNC: 15.2 G/DL (ref 11.7–15.7)
IMM GRANULOCYTES # BLD: 0 10E9/L (ref 0–0.4)
IMM GRANULOCYTES NFR BLD: 0 %
LIPASE SERPL-CCNC: 120 U/L (ref 73–393)
LYMPHOCYTES # BLD AUTO: 0.6 10E9/L (ref 0.8–5.3)
LYMPHOCYTES NFR BLD AUTO: 29.3 %
MCH RBC QN AUTO: 33.3 PG (ref 26.5–33)
MCHC RBC AUTO-ENTMCNC: 33.6 G/DL (ref 31.5–36.5)
MCV RBC AUTO: 99 FL (ref 78–100)
MONOCYTES # BLD AUTO: 0.3 10E9/L (ref 0–1.3)
MONOCYTES NFR BLD AUTO: 14.1 %
NEUTROPHILS # BLD AUTO: 1.1 10E9/L (ref 1.6–8.3)
NEUTROPHILS NFR BLD AUTO: 53.6 %
PLATELET # BLD AUTO: 157 10E9/L (ref 150–450)
POTASSIUM SERPL-SCNC: 3.3 MMOL/L (ref 3.4–5.3)
PROT SERPL-MCNC: 9.4 G/DL (ref 6.8–8.8)
RBC # BLD AUTO: 4.56 10E12/L (ref 3.8–5.2)
SODIUM SERPL-SCNC: 135 MMOL/L (ref 133–144)
WBC # BLD AUTO: 2 10E9/L (ref 4–11)

## 2020-03-15 PROCEDURE — 99283 EMERGENCY DEPT VISIT LOW MDM: CPT

## 2020-03-15 PROCEDURE — 84703 CHORIONIC GONADOTROPIN ASSAY: CPT | Performed by: EMERGENCY MEDICINE

## 2020-03-15 PROCEDURE — 25000128 H RX IP 250 OP 636

## 2020-03-15 PROCEDURE — 80177 DRUG SCRN QUAN LEVETIRACETAM: CPT | Performed by: EMERGENCY MEDICINE

## 2020-03-15 PROCEDURE — 83690 ASSAY OF LIPASE: CPT | Performed by: EMERGENCY MEDICINE

## 2020-03-15 PROCEDURE — 80053 COMPREHEN METABOLIC PANEL: CPT | Performed by: EMERGENCY MEDICINE

## 2020-03-15 PROCEDURE — 85025 COMPLETE CBC W/AUTO DIFF WBC: CPT | Performed by: EMERGENCY MEDICINE

## 2020-03-15 RX ORDER — ONDANSETRON 4 MG/1
4 TABLET, ORALLY DISINTEGRATING ORAL EVERY 8 HOURS PRN
Qty: 10 TABLET | Refills: 0 | Status: SHIPPED | OUTPATIENT
Start: 2020-03-15 | End: 2020-10-21

## 2020-03-15 RX ORDER — LEVETIRACETAM 500 MG/1
1000 TABLET ORAL 2 TIMES DAILY
COMMUNITY
Start: 2020-02-19 | End: 2023-11-07

## 2020-03-15 RX ORDER — ONDANSETRON 2 MG/ML
INJECTION INTRAMUSCULAR; INTRAVENOUS
Status: COMPLETED
Start: 2020-03-15 | End: 2020-03-15

## 2020-03-15 RX ADMIN — ONDANSETRON 4 MG: 2 INJECTION INTRAMUSCULAR; INTRAVENOUS at 18:09

## 2020-03-15 ASSESSMENT — ENCOUNTER SYMPTOMS
VOMITING: 1
NAUSEA: 1
ARTHRALGIAS: 0
WOUND: 1
ABDOMINAL PAIN: 1
SLEEP DISTURBANCE: 1
CONFUSION: 1
DIARRHEA: 0
SEIZURES: 1
DYSURIA: 0
MYALGIAS: 0

## 2020-03-15 NOTE — ED NOTES
Bed: ED23  Expected date:   Expected time:   Means of arrival:   Comments:  HEMS 424 24F seizure stable eta 10

## 2020-03-15 NOTE — ED TRIAGE NOTES
At home, had a witnessed seizure by a friend. Hx of Epilepsy. Seizure on Thursday, compliant with meds. Seizure lasted 2 mins, tonic clonic per EMS. Posictal for 10 mins. .

## 2020-03-15 NOTE — ED PROVIDER NOTES
"  History     Chief Complaint:  Seizures    The history is provided by the patient and a significant other.      Darya Hamilton is a 24 year old female with a history of seizures who presents to the emergency department via EMS with her boyfriend for evaluation of a seizure. Patient was admitted to [facility] one month ago for her first seizure. She had a full workup, see imaging results below. She was discharged with Keppra and notes she has been compliant with her medication.     Yesterday, the patient reports developing some nausea and vomiting that has continued into today. She states she has been unable to sleep, secondary to the nausea, and has been awake since 1700 last night. Today, the patient did not have to go to work, so she had a relaxing day watching [employer], but did not sleep. About an hour prior to arrival she was laying in bed watching TV. She remembers closing her eyes, then the next thing she remembers was waking up on the floor. She states she bit her tongue but did not lose control of her bladder. She knew something was not right, so she called EMS. While en route to the ED, they noticed she had a scratch to the left side of her face, which she believes she did herself. Patient reports being slightly confused after the episode but was able to answer the questions asked by EMS, just slower than normal.    Here, the patient states she is feeling better but is still very tired and \"groggy.\" She also complains of abdominal pain and nausea since the seizure. She notes that her abdominal pain and nausea always come together. Patient denies any diarrhea, dysuria, vision changes, or extremity abnormalities. To note, the patient has not followed with neurology since her first episode but has an appointment in the next week.    Last period: Believes to be beginning of March    Imaging from 02/18/2020    CT head brain without contrast 2/18/20  Unremarkable noncontrast head CT.    MR head brain " [facility] 2/18/20  1. No intracranial abnormality.  2. Specifically, no parenchymal structural or migration abnormality is identified.    Allergies:  Aspirin  Hydrocodone     Medications:    Keppra     Past Medical History:    Hodgkin's lymphoma - cleared   Seizures     Past Surgical History:    Cholecystectomy     Family History:    No past pertinent family history.    Social History:  Negative for tobacco use.  Positive for alcohol use.  Negative for drug use.  Marital Status:  Single      Review of Systems   Eyes: Negative for visual disturbance.   Gastrointestinal: Positive for abdominal pain, nausea and vomiting. Negative for diarrhea.   Genitourinary: Negative for dysuria.        (-) loss of bladder control   Musculoskeletal: Negative for arthralgias and myalgias.   Skin: Positive for wound (scratch to left side of face).   Neurological: Positive for seizures.   Psychiatric/Behavioral: Positive for confusion and sleep disturbance.   All other systems reviewed and are negative.      Physical Exam     Patient Vitals for the past 24 hrs:   BP Temp Temp src Pulse Heart Rate Resp SpO2   03/15/20 1930 116/89 -- -- 94 93 24 97 %   03/15/20 1900 99/81 -- -- 89 90 19 97 %   03/15/20 1857 -- -- -- -- 86 16 97 %   03/15/20 1849 -- -- -- -- 87 17 97 %   03/15/20 1842 -- -- -- -- 94 25 97 %   03/15/20 1830 (!) 113/90 -- -- 105 93 17 --   03/15/20 1823 -- -- -- -- 86 28 97 %   03/15/20 1751 (!) 120/95 98.7  F (37.1  C) Oral -- 97 18 97 %       Physical Exam  Eye:  Pupils are equal, round, and reactive.  Extraocular movements intact.    ENT:  No rhinorrhea.  Moist mucus membranes.  Normal tonsil. Tongue shows a bite injury to the left tip without bleeding.     Cardiac:  Regular rate and rhythm.  No murmurs, gallops, or rubs.    Pulmonary:  Clear to auscultation bilaterally.  No wheezes, rales, or rhonchi.    Abdomen:  Positive bowel sounds.  Abdomen is soft and non-distended, without focal tenderness.    Musculoskeletal:  Normal  movement of all extremities without evidence for deficit.    Skin:  Warm and dry without rashes. There are three vertical abrasions to the left cheek consistent with self-induced scratch.     Neurologic:  CN II - XII intact.  5/5 strength in all extremities.  Normal sensation throughout.  Normal finger to nose and heel to shin.  2+ patellar reflexes.  Normal gait.    Psychiatric:  Normal affect with appropriate interaction with examiner.    Emergency Department Course   Laboratory:  Laboratory findings were communicated with the patient and family who voiced understanding of the findings.    CBC: WBC: 2.0 (L), HGB: 15.2, PLT: 157  CMP: Glucose: 145 (H), Potassium: 3.3 (L), Protein Total: 9.4 (H), ALT: 114 (H), AST: 211 (H), o/w WNL (Creatinine: 0.60)    HCG Qualitative Pregnancy: Negative  Lipase: 120  Keppra Level: Pending     Procedures:  None.    Interventions:  1809 Zofran 4 mg PO    Emergency Department Course:  Past medical records, nursing notes, and vitals reviewed.    1820 I performed an exam of the patient as documented above.     IV was inserted and blood was drawn for laboratory testing, results above.    2010 I rechecked the patient and discussed the results of her workup thus far.     Findings and plan explained to the Patient and significant other. Patient discharged home with instructions regarding supportive care, medications, and reasons to return. The importance of close follow-up was reviewed. The patient was prescribed Zofran.    I personally reviewed the laboratory results with the Patient and significant other and answered all related questions prior to discharge.     Impression & Plan   Medical Decision Making:  This delightful 24-year-old woman with 1 prior seizure, undergoing a very thorough investigation at an outside hospital with MRI and EEG presents to us after having another probable seizure today.  The patient notes she has been taking her Keppra regularly.  She notes that she has had  some abdominal cramping and dry heaves over the last few days which is not uncommon for her.  This is kept her awake and she has had poor sleep.  Today, she was watching television when she awoke on the floor after having a seizure.  She did bite her tongue but did not suffer any other serious trauma.  She did accidentally scratch her face with her own fingernails.  Otherwise, she shows no serious trauma to her head, neck, or torso.  She was mildly postictal initially but cleared appropriately during her stay.  Her head to toe neurologic exam is normal.  I do not believe she would require repeat imaging of her head at this time.  With her vomiting, electrolytes were obtained which are all unremarkable.  She does have some elevated liver function tests, though these were also elevated during her last hospital stay and was thought to be due to alcohol use.  She shows no evidence of alcohol withdrawal at this time.  She also had a leukopenia during her prior admission, thought maybe to be related to her past non-Hodgkin's lymphoma which has been in remission.    At this juncture, I feel she is safe for discharge home.  Rather than changing her medications considering this is her first seizure since her first and she has been compliant on her meds though with the possible inciting issues of poor sleep, we will have her follow-up with neurology next week at her scheduled appointment.  I did order a Keppra level which is pending.  They were otherwise advised to return to us for any further seizures or other emergent concerns.  She was given strict seizure precautions of  avoidance of driving or bathing unattended.    Diagnosis:    ICD-10-CM    1. Recurrent seizures (H)  G40.909    2. Transaminitis  R74.0    3. Nausea  R11.0        Disposition:  Discharged to home.    Discharge Medications:  New Prescriptions    ONDANSETRON (ZOFRAN ODT) 4 MG ODT TAB    Take 1 tablet (4 mg) by mouth every 8 hours as needed for nausea        Scribe Disclosure:  I, Janessa Matson, am serving as a scribe at 6:28 PM on 3/15/2020 to document services personally performed by Trierweiler, Chad A, MD, based on my observations and the provider's statements to me.      Trierweiler, Chad A, MD  03/15/20 [id number]

## 2020-03-15 NOTE — ED AVS SNAPSHOT
Emergency Department  [address] [place] [address]  Phone:  [phone]  Fax:  [phone]                                    Darya Hamilton   MRN: [id number]    Department:   Emergency Department   Date of Visit:  3/15/2020           After Visit Summary Signature Page    I have received my discharge instructions, and my questions have been answered. I have discussed any challenges I see with this plan with the nurse or doctor.    ..........................................................................................................................................  Patient/Patient Representative Signature      ..........................................................................................................................................  Patient Representative Print Name and Relationship to Patient    ..................................................               ................................................  Date                                   Time    ..........................................................................................................................................  Reviewed by Signature/Title    ...................................................              ..............................................  Date                                               Time          22EPIC Rev 08/18

## 2020-03-16 NOTE — DISCHARGE INSTRUCTIONS
Discharge Instructions  Recurrent Seizure (Convulsion)    You were seen today for a seizure. The most common reason for a recurrent seizure is having missed a dose of your medication or taken it at a different time than normal. Other things that increase the risk of seizures include fever, sleep deprivation, alcohol, and stress. Although anti-seizure medications (anti-epileptic drugs) work for many people with seizure disorders, some people continue to have seizures even after trying several medications.    Generally, every Emergency Department visit should have a follow-up clinic visit with either a primary or a specialty clinic/provider. Please follow-up as instructed by your emergency provider today.    Return to the Emergency Department if:   You develop a fever over 100.4 F.  You feel much more ill, or develop new symptoms like severe headache.  You have trouble walking, seeing, or develop weakness or numbness in your arms or legs.     What can I do to help myself?  Take your medication exactly as directed, at the right times, and the right doses.   If you develop uncomfortable side effects, do not stop taking your anti-seizure medication without first speaking to your provider.   Do not let your prescription run out. Stopping anti-seizure medication abruptly can put you at risk of a seizure.  While taking an anti-seizure medication, do not start taking any other medications including over-the-counter medications and herbal supplements without first checking with your provider because mixing them can be dangerous.  Do not drive until you have been rechecked by your provider and have been told it is safe to drive.  If you have a seizure while driving you may cause a motor vehicle accident with injury or death to yourself or others.   Do not swim, climb ladders, or do anything else that would be dangerous if you had another seizure or spell of loss of consciousness, until you are cleared by your provider.     Check your state driving requirements for patients with seizures on the Epilepsy Foundation Website at www.epilepsyfoundation.org/resources/drivingandtravel.cfm.  Do not drink alcohol.  Drinking alcohol increases the risk of seizures and can interfere with the effect of anti-seizure medications.  Start a seizure calendar to record any seizure triggers, such as days when you were sleep-deprived, stressed, drank alcohol, or (if you are a woman) had your period.  Remember, if one medication does not work for you, either because you cannot tolerate the side effects or because you continue to have seizures, your provider can suggest alternate medications or alternate methods of taking the medication.  If you were given a prescription for medicine here today, be sure to read all of the information (including the package insert) that comes with your prescription.  This will include important information about the medicine, its side effects, and any warnings that you need to know about.  The pharmacist who fills the prescription can provide more information and answer questions you may have about the medicine.  If you have questions or concerns that the pharmacist cannot address, please call or return to the Emergency Department.   Remember that you can always come back to the Emergency Department if you are not able to see your regular provider in the amount of time listed above, if you get any new symptoms, or if there is anything that worries you.

## 2020-03-17 LAB — LEVETIRACETAM SERPL-MCNC: <2 UG/ML (ref 12–46)

## 2020-05-03 ENCOUNTER — APPOINTMENT (OUTPATIENT)
Dept: CARDIOLOGY | Facility: CLINIC | Age: 25
DRG: 368 | End: 2020-05-03
Attending: INTERNAL MEDICINE

## 2020-05-03 ENCOUNTER — APPOINTMENT (OUTPATIENT)
Dept: GENERAL RADIOLOGY | Facility: CLINIC | Age: 25
DRG: 368 | End: 2020-05-03
Attending: INTERNAL MEDICINE

## 2020-05-03 ENCOUNTER — APPOINTMENT (OUTPATIENT)
Dept: CT IMAGING | Facility: CLINIC | Age: 25
DRG: 368 | End: 2020-05-03
Attending: EMERGENCY MEDICINE

## 2020-05-03 ENCOUNTER — HOSPITAL ENCOUNTER (INPATIENT)
Facility: CLINIC | Age: 25
LOS: 3 days | Discharge: HOME | DRG: 368 | End: 2020-05-06
Attending: EMERGENCY MEDICINE | Admitting: INTERNAL MEDICINE

## 2020-05-03 DIAGNOSIS — E63.9 CARDIOMYOPATHY DUE METABOLIC OR NUTRITIONAL DISEASE (H): Primary | ICD-10-CM

## 2020-05-03 DIAGNOSIS — E88.9 CARDIOMYOPATHY DUE METABOLIC OR NUTRITIONAL DISEASE (H): Primary | ICD-10-CM

## 2020-05-03 DIAGNOSIS — Z78.9 ALCOHOL USE: ICD-10-CM

## 2020-05-03 DIAGNOSIS — R73.9 HYPERGLYCEMIA: ICD-10-CM

## 2020-05-03 DIAGNOSIS — I43 CARDIOMYOPATHY DUE METABOLIC OR NUTRITIONAL DISEASE (H): Primary | ICD-10-CM

## 2020-05-03 DIAGNOSIS — R00.0 WIDE-COMPLEX TACHYCARDIA: ICD-10-CM

## 2020-05-03 DIAGNOSIS — E87.5 HYPERKALEMIA: ICD-10-CM

## 2020-05-03 DIAGNOSIS — E87.29 KETOACIDOSIS: ICD-10-CM

## 2020-05-03 PROBLEM — I47.29 PAROXYSMAL VENTRICULAR TACHYCARDIA (H): Status: ACTIVE | Noted: 2020-05-03

## 2020-05-03 PROBLEM — F10.931 DELIRIUM TREMENS (H): Status: ACTIVE | Noted: 2020-05-03

## 2020-05-03 LAB
ABO + RH BLD: NORMAL
ABO + RH BLD: NORMAL
ALBUMIN SERPL-MCNC: 5.1 G/DL (ref 3.4–5)
ALBUMIN UR-MCNC: 10 MG/DL
ALP SERPL-CCNC: 85 U/L (ref 40–150)
ALT SERPL W P-5'-P-CCNC: 46 U/L (ref 0–50)
AMPHETAMINES UR QL SCN: NEGATIVE
ANION GAP SERPL CALCULATED.3IONS-SCNC: 12 MMOL/L (ref 3–14)
ANION GAP SERPL CALCULATED.3IONS-SCNC: 18 MMOL/L (ref 3–14)
ANION GAP SERPL CALCULATED.3IONS-SCNC: 26 MMOL/L (ref 3–14)
ANION GAP SERPL CALCULATED.3IONS-SCNC: 9 MMOL/L (ref 3–14)
APPEARANCE UR: ABNORMAL
APTT PPP: 23 SEC (ref 22–37)
AST SERPL W P-5'-P-CCNC: 68 U/L (ref 0–45)
BARBITURATES UR QL: NEGATIVE
BASE DEFICIT BLDA-SCNC: 2.7 MMOL/L
BASE DEFICIT BLDV-SCNC: 1.3 MMOL/L
BASE DEFICIT BLDV-SCNC: 10.4 MMOL/L
BASE DEFICIT BLDV-SCNC: 18.1 MMOL/L
BASOPHILS # BLD AUTO: 0 10E9/L (ref 0–0.2)
BASOPHILS NFR BLD AUTO: 0.1 %
BENZODIAZ UR QL: NEGATIVE
BILIRUB SERPL-MCNC: 1 MG/DL (ref 0.2–1.3)
BILIRUB UR QL STRIP: NEGATIVE
BLD GP AB SCN SERPL QL: NORMAL
BLOOD BANK CMNT PATIENT-IMP: NORMAL
BUN SERPL-MCNC: 13 MG/DL (ref 7–30)
BUN SERPL-MCNC: 17 MG/DL (ref 7–30)
BUN SERPL-MCNC: 5 MG/DL (ref 7–30)
BUN SERPL-MCNC: 7 MG/DL (ref 7–30)
CA-I SERPL ISE-MCNC: 4.2 MG/DL (ref 4.4–5.2)
CALCIUM SERPL-MCNC: 7.8 MG/DL (ref 8.5–10.1)
CALCIUM SERPL-MCNC: 8.4 MG/DL (ref 8.5–10.1)
CALCIUM SERPL-MCNC: 8.4 MG/DL (ref 8.5–10.1)
CALCIUM SERPL-MCNC: 8.8 MG/DL (ref 8.5–10.1)
CANNABINOIDS UR QL SCN: NEGATIVE
CHLORIDE SERPL-SCNC: 101 MMOL/L (ref 94–109)
CHLORIDE SERPL-SCNC: 101 MMOL/L (ref 94–109)
CHLORIDE SERPL-SCNC: 103 MMOL/L (ref 94–109)
CHLORIDE SERPL-SCNC: 93 MMOL/L (ref 94–109)
CK SERPL-CCNC: 191 U/L (ref 30–225)
CO2 SERPL-SCNC: 13 MMOL/L (ref 20–32)
CO2 SERPL-SCNC: 19 MMOL/L (ref 20–32)
CO2 SERPL-SCNC: 22 MMOL/L (ref 20–32)
CO2 SERPL-SCNC: 8 MMOL/L (ref 20–32)
COCAINE UR QL: NEGATIVE
COLOR UR AUTO: YELLOW
CREAT SERPL-MCNC: 0.5 MG/DL (ref 0.52–1.04)
CREAT SERPL-MCNC: 0.66 MG/DL (ref 0.52–1.04)
CREAT SERPL-MCNC: 0.73 MG/DL (ref 0.52–1.04)
CREAT SERPL-MCNC: 0.96 MG/DL (ref 0.52–1.04)
DIFFERENTIAL METHOD BLD: ABNORMAL
EOSINOPHIL # BLD AUTO: 0 10E9/L (ref 0–0.7)
EOSINOPHIL NFR BLD AUTO: 0 %
ERYTHROCYTE [DISTWIDTH] IN BLOOD BY AUTOMATED COUNT: 12 % (ref 10–15)
ERYTHROCYTE [DISTWIDTH] IN BLOOD BY AUTOMATED COUNT: 12.2 % (ref 10–15)
ETHANOL SERPL-MCNC: <0.01 G/DL
GFR SERPL CREATININE-BSD FRML MDRD: 81 ML/MIN/{1.73_M2}
GFR SERPL CREATININE-BSD FRML MDRD: >90 ML/MIN/{1.73_M2}
GLUCOSE BLDC GLUCOMTR-MCNC: 124 MG/DL (ref 70–99)
GLUCOSE BLDC GLUCOMTR-MCNC: 128 MG/DL (ref 70–99)
GLUCOSE BLDC GLUCOMTR-MCNC: 136 MG/DL (ref 70–99)
GLUCOSE BLDC GLUCOMTR-MCNC: 138 MG/DL (ref 70–99)
GLUCOSE BLDC GLUCOMTR-MCNC: 148 MG/DL (ref 70–99)
GLUCOSE BLDC GLUCOMTR-MCNC: 151 MG/DL (ref 70–99)
GLUCOSE BLDC GLUCOMTR-MCNC: 157 MG/DL (ref 70–99)
GLUCOSE BLDC GLUCOMTR-MCNC: 170 MG/DL (ref 70–99)
GLUCOSE BLDC GLUCOMTR-MCNC: 176 MG/DL (ref 70–99)
GLUCOSE BLDC GLUCOMTR-MCNC: 181 MG/DL (ref 70–99)
GLUCOSE BLDC GLUCOMTR-MCNC: 186 MG/DL (ref 70–99)
GLUCOSE BLDC GLUCOMTR-MCNC: 194 MG/DL (ref 70–99)
GLUCOSE BLDC GLUCOMTR-MCNC: 201 MG/DL (ref 70–99)
GLUCOSE SERPL-MCNC: 140 MG/DL (ref 70–99)
GLUCOSE SERPL-MCNC: 148 MG/DL (ref 70–99)
GLUCOSE SERPL-MCNC: 179 MG/DL (ref 70–99)
GLUCOSE SERPL-MCNC: 356 MG/DL (ref 70–99)
GLUCOSE UR STRIP-MCNC: 70 MG/DL
HBA1C MFR BLD: 4.6 % (ref 0–5.6)
HCG SERPL QL: NEGATIVE
HCO3 BLD-SCNC: 20 MMOL/L (ref 21–28)
HCO3 BLDV-SCNC: 15 MMOL/L (ref 21–28)
HCO3 BLDV-SCNC: 22 MMOL/L (ref 21–28)
HCO3 BLDV-SCNC: 9 MMOL/L (ref 21–28)
HCT VFR BLD AUTO: 39.7 % (ref 35–47)
HCT VFR BLD AUTO: 48.7 % (ref 35–47)
HGB BLD-MCNC: 13.1 G/DL (ref 11.7–15.7)
HGB BLD-MCNC: 13.9 G/DL (ref 11.7–15.7)
HGB BLD-MCNC: 16.8 G/DL (ref 11.7–15.7)
HGB UR QL STRIP: ABNORMAL
IMM GRANULOCYTES # BLD: 0 10E9/L (ref 0–0.4)
IMM GRANULOCYTES NFR BLD: 0.2 %
INR PPP: 1.26 (ref 0.86–1.14)
INTERPRETATION ECG - MUSE: NORMAL
INTERPRETATION ECG - MUSE: NORMAL
KETONES BLD-SCNC: 6.1 MMOL/L (ref 0–0.6)
KETONES UR STRIP-MCNC: 40 MG/DL
LACTATE BLD-SCNC: 2.2 MMOL/L (ref 0.7–2)
LACTATE BLD-SCNC: 3.5 MMOL/L (ref 0.7–2)
LACTATE BLD-SCNC: 4 MMOL/L (ref 0.7–2)
LACTATE BLD-SCNC: 7.1 MMOL/L (ref 0.7–2)
LEUKOCYTE ESTERASE UR QL STRIP: ABNORMAL
LIPASE SERPL-CCNC: 141 U/L (ref 73–393)
LYMPHOCYTES # BLD AUTO: 0.6 10E9/L (ref 0.8–5.3)
LYMPHOCYTES NFR BLD AUTO: 4.2 %
MAGNESIUM SERPL-MCNC: 1.1 MG/DL (ref 1.6–2.3)
MAGNESIUM SERPL-MCNC: 1.7 MG/DL (ref 1.6–2.3)
MAGNESIUM SERPL-MCNC: 2.8 MG/DL (ref 1.6–2.3)
MCH RBC QN AUTO: 33 PG (ref 26.5–33)
MCH RBC QN AUTO: 34.2 PG (ref 26.5–33)
MCHC RBC AUTO-ENTMCNC: 34.5 G/DL (ref 31.5–36.5)
MCHC RBC AUTO-ENTMCNC: 35 G/DL (ref 31.5–36.5)
MCV RBC AUTO: 94 FL (ref 78–100)
MCV RBC AUTO: 99 FL (ref 78–100)
MONOCYTES # BLD AUTO: 0.3 10E9/L (ref 0–1.3)
MONOCYTES NFR BLD AUTO: 2.1 %
MUCOUS THREADS #/AREA URNS LPF: PRESENT /LPF
NEUTROPHILS # BLD AUTO: 12.3 10E9/L (ref 1.6–8.3)
NEUTROPHILS NFR BLD AUTO: 93.4 %
NITRATE UR QL: NEGATIVE
NRBC # BLD AUTO: 0 10*3/UL
NRBC BLD AUTO-RTO: 0 /100
O2/TOTAL GAS SETTING VFR VENT: ABNORMAL %
O2/TOTAL GAS SETTING VFR VENT: ABNORMAL %
OPIATES UR QL SCN: NEGATIVE
OXYHGB MFR BLD: 94 % (ref 92–100)
OXYHGB MFR BLDV: 68 %
OXYHGB MFR BLDV: 69 %
OXYHGB MFR BLDV: 69 %
PCO2 BLD: 29 MM HG (ref 35–45)
PCO2 BLDV: 26 MM HG (ref 40–50)
PCO2 BLDV: 31 MM HG (ref 40–50)
PCO2 BLDV: 33 MM HG (ref 40–50)
PCP UR QL SCN: NEGATIVE
PH BLD: 7.45 PH (ref 7.35–7.45)
PH BLDV: 7.15 PH (ref 7.32–7.43)
PH BLDV: 7.29 PH (ref 7.32–7.43)
PH BLDV: 7.44 PH (ref 7.32–7.43)
PH UR STRIP: 6 PH (ref 5–7)
PHOSPHATE SERPL-MCNC: 0.8 MG/DL (ref 2.5–4.5)
PHOSPHATE SERPL-MCNC: 1.4 MG/DL (ref 2.5–4.5)
PHOSPHATE SERPL-MCNC: 4.8 MG/DL (ref 2.5–4.5)
PLATELET # BLD AUTO: 203 10E9/L (ref 150–450)
PLATELET # BLD AUTO: 338 10E9/L (ref 150–450)
PO2 BLD: 67 MM HG (ref 80–105)
PO2 BLDV: 34 MM HG (ref 25–47)
PO2 BLDV: 36 MM HG (ref 25–47)
PO2 BLDV: 39 MM HG (ref 25–47)
POTASSIUM SERPL-SCNC: 3.4 MMOL/L (ref 3.4–5.3)
POTASSIUM SERPL-SCNC: 3.7 MMOL/L (ref 3.4–5.3)
POTASSIUM SERPL-SCNC: 4.5 MMOL/L (ref 3.4–5.3)
POTASSIUM SERPL-SCNC: 6.1 MMOL/L (ref 3.4–5.3)
PROT SERPL-MCNC: 10.2 G/DL (ref 6.8–8.8)
RBC # BLD AUTO: 4.21 10E12/L (ref 3.8–5.2)
RBC # BLD AUTO: 4.91 10E12/L (ref 3.8–5.2)
RBC #/AREA URNS AUTO: 2 /HPF (ref 0–2)
SARS-COV-2 PCR COMMENT: NORMAL
SARS-COV-2 RNA SPEC QL NAA+PROBE: NEGATIVE
SARS-COV-2 RNA SPEC QL NAA+PROBE: NORMAL
SODIUM SERPL-SCNC: 127 MMOL/L (ref 133–144)
SODIUM SERPL-SCNC: 132 MMOL/L (ref 133–144)
SODIUM SERPL-SCNC: 132 MMOL/L (ref 133–144)
SODIUM SERPL-SCNC: 134 MMOL/L (ref 133–144)
SOURCE: ABNORMAL
SP GR UR STRIP: 1.02 (ref 1–1.03)
SPECIMEN EXP DATE BLD: NORMAL
SPECIMEN SOURCE: NORMAL
SPECIMEN SOURCE: NORMAL
SQUAMOUS #/AREA URNS AUTO: 8 /HPF (ref 0–1)
T4 FREE SERPL-MCNC: 0.86 NG/DL (ref 0.76–1.46)
TROPONIN I SERPL-MCNC: 0.08 UG/L (ref 0–0.04)
TROPONIN I SERPL-MCNC: 0.5 UG/L (ref 0–0.04)
TROPONIN I SERPL-MCNC: 0.75 UG/L (ref 0–0.04)
TROPONIN I SERPL-MCNC: <0.015 UG/L (ref 0–0.04)
TSH SERPL DL<=0.005 MIU/L-ACNC: 4.31 MU/L (ref 0.4–4)
UROBILINOGEN UR STRIP-MCNC: NORMAL MG/DL (ref 0–2)
WBC # BLD AUTO: 13.1 10E9/L (ref 4–11)
WBC # BLD AUTO: 3.3 10E9/L (ref 4–11)
WBC #/AREA URNS AUTO: 1 /HPF (ref 0–5)

## 2020-05-03 PROCEDURE — HZ2ZZZZ DETOXIFICATION SERVICES FOR SUBSTANCE ABUSE TREATMENT: ICD-10-PCS | Performed by: INTERNAL MEDICINE

## 2020-05-03 PROCEDURE — 25000131 ZZH RX MED GY IP 250 OP 636 PS 637: Performed by: EMERGENCY MEDICINE

## 2020-05-03 PROCEDURE — 96365 THER/PROPH/DIAG IV INF INIT: CPT | Mod: 59

## 2020-05-03 PROCEDURE — 84100 ASSAY OF PHOSPHORUS: CPT | Performed by: EMERGENCY MEDICINE

## 2020-05-03 PROCEDURE — 36600 WITHDRAWAL OF ARTERIAL BLOOD: CPT

## 2020-05-03 PROCEDURE — 82550 ASSAY OF CK (CPK): CPT | Performed by: EMERGENCY MEDICINE

## 2020-05-03 PROCEDURE — 93306 TTE W/DOPPLER COMPLETE: CPT

## 2020-05-03 PROCEDURE — 80048 BASIC METABOLIC PNL TOTAL CA: CPT | Performed by: EMERGENCY MEDICINE

## 2020-05-03 PROCEDURE — 25000128 H RX IP 250 OP 636: Performed by: EMERGENCY MEDICINE

## 2020-05-03 PROCEDURE — 86901 BLOOD TYPING SEROLOGIC RH(D): CPT | Performed by: EMERGENCY MEDICINE

## 2020-05-03 PROCEDURE — 25000132 ZZH RX MED GY IP 250 OP 250 PS 637: Performed by: INTERNAL MEDICINE

## 2020-05-03 PROCEDURE — 74177 CT ABD & PELVIS W/CONTRAST: CPT

## 2020-05-03 PROCEDURE — 84100 ASSAY OF PHOSPHORUS: CPT | Performed by: INTERNAL MEDICINE

## 2020-05-03 PROCEDURE — 25800030 ZZH RX IP 258 OP 636: Performed by: INTERNAL MEDICINE

## 2020-05-03 PROCEDURE — 99285 EMERGENCY DEPT VISIT HI MDM: CPT | Mod: 25

## 2020-05-03 PROCEDURE — 80320 DRUG SCREEN QUANTALCOHOLS: CPT | Performed by: EMERGENCY MEDICINE

## 2020-05-03 PROCEDURE — 27211441 ZZ H KIT SHRLOCK 5FR POWER PICC TRIPLE LUMEN

## 2020-05-03 PROCEDURE — 84484 ASSAY OF TROPONIN QUANT: CPT | Performed by: INTERNAL MEDICINE

## 2020-05-03 PROCEDURE — 84439 ASSAY OF FREE THYROXINE: CPT | Performed by: EMERGENCY MEDICINE

## 2020-05-03 PROCEDURE — 25000128 H RX IP 250 OP 636

## 2020-05-03 PROCEDURE — 5A2204Z RESTORATION OF CARDIAC RHYTHM, SINGLE: ICD-10-PCS | Performed by: INTERNAL MEDICINE

## 2020-05-03 PROCEDURE — 82330 ASSAY OF CALCIUM: CPT | Performed by: INTERNAL MEDICINE

## 2020-05-03 PROCEDURE — C9113 INJ PANTOPRAZOLE SODIUM, VIA: HCPCS | Performed by: INTERNAL MEDICINE

## 2020-05-03 PROCEDURE — 99223 1ST HOSP IP/OBS HIGH 75: CPT | Mod: AI | Performed by: INTERNAL MEDICINE

## 2020-05-03 PROCEDURE — 84703 CHORIONIC GONADOTROPIN ASSAY: CPT | Performed by: EMERGENCY MEDICINE

## 2020-05-03 PROCEDURE — 85610 PROTHROMBIN TIME: CPT | Performed by: EMERGENCY MEDICINE

## 2020-05-03 PROCEDURE — 25000125 ZZHC RX 250: Performed by: EMERGENCY MEDICINE

## 2020-05-03 PROCEDURE — 84484 ASSAY OF TROPONIN QUANT: CPT | Performed by: EMERGENCY MEDICINE

## 2020-05-03 PROCEDURE — 25000125 ZZHC RX 250: Performed by: STUDENT IN AN ORGANIZED HEALTH CARE EDUCATION/TRAINING PROGRAM

## 2020-05-03 PROCEDURE — 25800025 ZZH RX 258: Performed by: STUDENT IN AN ORGANIZED HEALTH CARE EDUCATION/TRAINING PROGRAM

## 2020-05-03 PROCEDURE — 85018 HEMOGLOBIN: CPT | Performed by: INTERNAL MEDICINE

## 2020-05-03 PROCEDURE — 93005 ELECTROCARDIOGRAM TRACING: CPT | Mod: 76

## 2020-05-03 PROCEDURE — 99292 CRITICAL CARE ADDL 30 MIN: CPT | Mod: 25 | Performed by: INTERNAL MEDICINE

## 2020-05-03 PROCEDURE — 80048 BASIC METABOLIC PNL TOTAL CA: CPT | Performed by: INTERNAL MEDICINE

## 2020-05-03 PROCEDURE — 82805 BLOOD GASES W/O2 SATURATION: CPT | Performed by: INTERNAL MEDICINE

## 2020-05-03 PROCEDURE — 25800025 ZZH RX 258: Performed by: INTERNAL MEDICINE

## 2020-05-03 PROCEDURE — 25000125 ZZHC RX 250: Performed by: INTERNAL MEDICINE

## 2020-05-03 PROCEDURE — 99291 CRITICAL CARE FIRST HOUR: CPT | Mod: 25 | Performed by: INTERNAL MEDICINE

## 2020-05-03 PROCEDURE — 25800030 ZZH RX IP 258 OP 636: Performed by: EMERGENCY MEDICINE

## 2020-05-03 PROCEDURE — 80307 DRUG TEST PRSMV CHEM ANLYZR: CPT | Performed by: EMERGENCY MEDICINE

## 2020-05-03 PROCEDURE — 25800030 ZZH RX IP 258 OP 636: Performed by: STUDENT IN AN ORGANIZED HEALTH CARE EDUCATION/TRAINING PROGRAM

## 2020-05-03 PROCEDURE — 20000003 ZZH R&B ICU

## 2020-05-03 PROCEDURE — 25000128 H RX IP 250 OP 636: Performed by: INTERNAL MEDICINE

## 2020-05-03 PROCEDURE — 00000146 ZZHCL STATISTIC GLUCOSE BY METER IP

## 2020-05-03 PROCEDURE — 71045 X-RAY EXAM CHEST 1 VIEW: CPT

## 2020-05-03 PROCEDURE — 83605 ASSAY OF LACTIC ACID: CPT | Performed by: INTERNAL MEDICINE

## 2020-05-03 PROCEDURE — 87635 SARS-COV-2 COVID-19 AMP PRB: CPT | Performed by: EMERGENCY MEDICINE

## 2020-05-03 PROCEDURE — 83605 ASSAY OF LACTIC ACID: CPT | Performed by: EMERGENCY MEDICINE

## 2020-05-03 PROCEDURE — 83690 ASSAY OF LIPASE: CPT | Performed by: EMERGENCY MEDICINE

## 2020-05-03 PROCEDURE — 80053 COMPREHEN METABOLIC PANEL: CPT | Performed by: EMERGENCY MEDICINE

## 2020-05-03 PROCEDURE — 80177 DRUG SCRN QUAN LEVETIRACETAM: CPT | Performed by: EMERGENCY MEDICINE

## 2020-05-03 PROCEDURE — 25800025 ZZH RX 258: Performed by: EMERGENCY MEDICINE

## 2020-05-03 PROCEDURE — 87040 BLOOD CULTURE FOR BACTERIA: CPT | Performed by: INTERNAL MEDICINE

## 2020-05-03 PROCEDURE — 96368 THER/DIAG CONCURRENT INF: CPT

## 2020-05-03 PROCEDURE — 96366 THER/PROPH/DIAG IV INF ADDON: CPT

## 2020-05-03 PROCEDURE — 40000275 ZZH STATISTIC RCP TIME EA 10 MIN

## 2020-05-03 PROCEDURE — 25000128 H RX IP 250 OP 636: Performed by: STUDENT IN AN ORGANIZED HEALTH CARE EDUCATION/TRAINING PROGRAM

## 2020-05-03 PROCEDURE — 92960 CARDIOVERSION ELECTRIC EXT: CPT | Performed by: INTERNAL MEDICINE

## 2020-05-03 PROCEDURE — 36569 INSJ PICC 5 YR+ W/O IMAGING: CPT

## 2020-05-03 PROCEDURE — 83036 HEMOGLOBIN GLYCOSYLATED A1C: CPT | Performed by: EMERGENCY MEDICINE

## 2020-05-03 PROCEDURE — 96361 HYDRATE IV INFUSION ADD-ON: CPT

## 2020-05-03 PROCEDURE — 83735 ASSAY OF MAGNESIUM: CPT | Performed by: INTERNAL MEDICINE

## 2020-05-03 PROCEDURE — 25500064 ZZH RX 255 OP 636: Performed by: INTERNAL MEDICINE

## 2020-05-03 PROCEDURE — 82805 BLOOD GASES W/O2 SATURATION: CPT | Performed by: EMERGENCY MEDICINE

## 2020-05-03 PROCEDURE — 86900 BLOOD TYPING SEROLOGIC ABO: CPT | Performed by: EMERGENCY MEDICINE

## 2020-05-03 PROCEDURE — 93306 TTE W/DOPPLER COMPLETE: CPT | Mod: 26 | Performed by: INTERNAL MEDICINE

## 2020-05-03 PROCEDURE — 93010 ELECTROCARDIOGRAM REPORT: CPT | Mod: 76 | Performed by: INTERNAL MEDICINE

## 2020-05-03 PROCEDURE — 94660 CPAP INITIATION&MGMT: CPT

## 2020-05-03 PROCEDURE — 85027 COMPLETE CBC AUTOMATED: CPT | Performed by: INTERNAL MEDICINE

## 2020-05-03 PROCEDURE — 93005 ELECTROCARDIOGRAM TRACING: CPT

## 2020-05-03 PROCEDURE — 86850 RBC ANTIBODY SCREEN: CPT | Performed by: EMERGENCY MEDICINE

## 2020-05-03 PROCEDURE — 36415 COLL VENOUS BLD VENIPUNCTURE: CPT | Performed by: INTERNAL MEDICINE

## 2020-05-03 PROCEDURE — 85025 COMPLETE CBC W/AUTO DIFF WBC: CPT | Performed by: EMERGENCY MEDICINE

## 2020-05-03 PROCEDURE — C9113 INJ PANTOPRAZOLE SODIUM, VIA: HCPCS | Performed by: STUDENT IN AN ORGANIZED HEALTH CARE EDUCATION/TRAINING PROGRAM

## 2020-05-03 PROCEDURE — 81001 URINALYSIS AUTO W/SCOPE: CPT | Performed by: EMERGENCY MEDICINE

## 2020-05-03 PROCEDURE — 99223 1ST HOSP IP/OBS HIGH 75: CPT | Mod: CS | Performed by: INTERNAL MEDICINE

## 2020-05-03 PROCEDURE — 84443 ASSAY THYROID STIM HORMONE: CPT | Performed by: EMERGENCY MEDICINE

## 2020-05-03 PROCEDURE — 85730 THROMBOPLASTIN TIME PARTIAL: CPT | Performed by: EMERGENCY MEDICINE

## 2020-05-03 PROCEDURE — 96376 TX/PRO/DX INJ SAME DRUG ADON: CPT

## 2020-05-03 PROCEDURE — 82010 KETONE BODYS QUAN: CPT | Performed by: EMERGENCY MEDICINE

## 2020-05-03 PROCEDURE — 27210339 ZZH CIRCUIT HUMIDITY W/CPAP BIP

## 2020-05-03 PROCEDURE — 96375 TX/PRO/DX INJ NEW DRUG ADDON: CPT

## 2020-05-03 PROCEDURE — 83735 ASSAY OF MAGNESIUM: CPT | Performed by: EMERGENCY MEDICINE

## 2020-05-03 PROCEDURE — 87389 HIV-1 AG W/HIV-1&-2 AB AG IA: CPT | Performed by: INTERNAL MEDICINE

## 2020-05-03 PROCEDURE — 25000132 ZZH RX MED GY IP 250 OP 250 PS 637: Performed by: STUDENT IN AN ORGANIZED HEALTH CARE EDUCATION/TRAINING PROGRAM

## 2020-05-03 PROCEDURE — 27210338 ZZH CIRCUIT HUMID FACE/TRACH MSK

## 2020-05-03 RX ORDER — POTASSIUM CHLORIDE 1.5 G/1.58G
20-40 POWDER, FOR SOLUTION ORAL
Status: DISCONTINUED | OUTPATIENT
Start: 2020-05-03 | End: 2020-05-06 | Stop reason: HOSPADM

## 2020-05-03 RX ORDER — LEVETIRACETAM 10 MG/ML
1000 INJECTION INTRAVASCULAR EVERY 12 HOURS
Status: DISCONTINUED | OUTPATIENT
Start: 2020-05-03 | End: 2020-05-06 | Stop reason: HOSPADM

## 2020-05-03 RX ORDER — FUROSEMIDE 10 MG/ML
20 INJECTION INTRAMUSCULAR; INTRAVENOUS ONCE
Status: COMPLETED | OUTPATIENT
Start: 2020-05-03 | End: 2020-05-03

## 2020-05-03 RX ORDER — LIDOCAINE 40 MG/G
CREAM TOPICAL
Status: DISCONTINUED | OUTPATIENT
Start: 2020-05-03 | End: 2020-05-06 | Stop reason: HOSPADM

## 2020-05-03 RX ORDER — MULTIPLE VITAMINS W/ MINERALS TAB 9MG-400MCG
1 TAB ORAL DAILY
Status: DISCONTINUED | OUTPATIENT
Start: 2020-05-03 | End: 2020-05-06 | Stop reason: HOSPADM

## 2020-05-03 RX ORDER — MAGNESIUM SULFATE HEPTAHYDRATE 40 MG/ML
2 INJECTION, SOLUTION INTRAVENOUS DAILY PRN
Status: DISCONTINUED | OUTPATIENT
Start: 2020-05-03 | End: 2020-05-06 | Stop reason: HOSPADM

## 2020-05-03 RX ORDER — LORAZEPAM 2 MG/ML
0.5 INJECTION INTRAMUSCULAR ONCE
Status: COMPLETED | OUTPATIENT
Start: 2020-05-03 | End: 2020-05-03

## 2020-05-03 RX ORDER — POTASSIUM CL/LIDO/0.9 % NACL 10MEQ/0.1L
10 INTRAVENOUS SOLUTION, PIGGYBACK (ML) INTRAVENOUS
Status: DISCONTINUED | OUTPATIENT
Start: 2020-05-03 | End: 2020-05-06 | Stop reason: HOSPADM

## 2020-05-03 RX ORDER — LORAZEPAM 2 MG/ML
2 INJECTION INTRAMUSCULAR
Status: DISCONTINUED | OUTPATIENT
Start: 2020-05-03 | End: 2020-05-06 | Stop reason: HOSPADM

## 2020-05-03 RX ORDER — POTASSIUM CHLORIDE 1.5 G/1.58G
20-40 POWDER, FOR SOLUTION ORAL
Status: DISCONTINUED | OUTPATIENT
Start: 2020-05-03 | End: 2020-05-03 | Stop reason: DRUGHIGH

## 2020-05-03 RX ORDER — GABAPENTIN 300 MG/1
300 CAPSULE ORAL EVERY 8 HOURS
Status: DISCONTINUED | OUTPATIENT
Start: 2020-05-08 | End: 2020-05-06 | Stop reason: HOSPADM

## 2020-05-03 RX ORDER — FOLIC ACID 5 MG/ML
1 INJECTION, SOLUTION INTRAMUSCULAR; INTRAVENOUS; SUBCUTANEOUS DAILY
Status: COMPLETED | OUTPATIENT
Start: 2020-05-04 | End: 2020-05-05

## 2020-05-03 RX ORDER — ADENOSINE 3 MG/ML
INJECTION, SOLUTION INTRAVENOUS
Status: COMPLETED
Start: 2020-05-03 | End: 2020-05-03

## 2020-05-03 RX ORDER — POTASSIUM CL/LIDO/0.9 % NACL 10MEQ/0.1L
10 INTRAVENOUS SOLUTION, PIGGYBACK (ML) INTRAVENOUS
Status: DISCONTINUED | OUTPATIENT
Start: 2020-05-03 | End: 2020-05-03 | Stop reason: DRUGHIGH

## 2020-05-03 RX ORDER — MAGNESIUM SULFATE HEPTAHYDRATE 40 MG/ML
4 INJECTION, SOLUTION INTRAVENOUS EVERY 4 HOURS PRN
Status: DISCONTINUED | OUTPATIENT
Start: 2020-05-03 | End: 2020-05-06 | Stop reason: HOSPADM

## 2020-05-03 RX ORDER — LANOLIN ALCOHOL/MO/W.PET/CERES
100 CREAM (GRAM) TOPICAL DAILY
Status: DISCONTINUED | OUTPATIENT
Start: 2020-05-10 | End: 2020-05-06 | Stop reason: HOSPADM

## 2020-05-03 RX ORDER — POTASSIUM CHLORIDE 7.45 MG/ML
10 INJECTION INTRAVENOUS
Status: DISCONTINUED | OUTPATIENT
Start: 2020-05-03 | End: 2020-05-06 | Stop reason: HOSPADM

## 2020-05-03 RX ORDER — GABAPENTIN 600 MG/1
1200 TABLET ORAL ONCE
Status: COMPLETED | OUTPATIENT
Start: 2020-05-03 | End: 2020-05-03

## 2020-05-03 RX ORDER — BISACODYL 10 MG
10 SUPPOSITORY, RECTAL RECTAL DAILY PRN
Status: DISCONTINUED | OUTPATIENT
Start: 2020-05-03 | End: 2020-05-06 | Stop reason: HOSPADM

## 2020-05-03 RX ORDER — ACETAMINOPHEN 325 MG/1
650 TABLET ORAL EVERY 4 HOURS PRN
Status: DISCONTINUED | OUTPATIENT
Start: 2020-05-03 | End: 2020-05-06 | Stop reason: HOSPADM

## 2020-05-03 RX ORDER — ONDANSETRON 2 MG/ML
4 INJECTION INTRAMUSCULAR; INTRAVENOUS EVERY 30 MIN PRN
Status: DISCONTINUED | OUTPATIENT
Start: 2020-05-03 | End: 2020-05-03

## 2020-05-03 RX ORDER — IOPAMIDOL 755 MG/ML
65 INJECTION, SOLUTION INTRAVASCULAR ONCE
Status: COMPLETED | OUTPATIENT
Start: 2020-05-03 | End: 2020-05-03

## 2020-05-03 RX ORDER — GABAPENTIN 300 MG/1
600 CAPSULE ORAL EVERY 8 HOURS
Status: DISCONTINUED | OUTPATIENT
Start: 2020-05-06 | End: 2020-05-06 | Stop reason: HOSPADM

## 2020-05-03 RX ORDER — DEXTROSE MONOHYDRATE 25 G/50ML
25-50 INJECTION, SOLUTION INTRAVENOUS
Status: DISCONTINUED | OUTPATIENT
Start: 2020-05-03 | End: 2020-05-06 | Stop reason: HOSPADM

## 2020-05-03 RX ORDER — LORAZEPAM 1 MG/1
1-2 TABLET ORAL EVERY 30 MIN PRN
Status: DISCONTINUED | OUTPATIENT
Start: 2020-05-03 | End: 2020-05-06 | Stop reason: HOSPADM

## 2020-05-03 RX ORDER — METOPROLOL TARTRATE 1 MG/ML
5 INJECTION, SOLUTION INTRAVENOUS EVERY 6 HOURS PRN
Status: DISCONTINUED | OUTPATIENT
Start: 2020-05-03 | End: 2020-05-06 | Stop reason: HOSPADM

## 2020-05-03 RX ORDER — NALOXONE HYDROCHLORIDE 0.4 MG/ML
.1-.4 INJECTION, SOLUTION INTRAMUSCULAR; INTRAVENOUS; SUBCUTANEOUS
Status: DISCONTINUED | OUTPATIENT
Start: 2020-05-03 | End: 2020-05-06 | Stop reason: HOSPADM

## 2020-05-03 RX ORDER — DEXTROSE MONOHYDRATE 25 G/50ML
25-50 INJECTION, SOLUTION INTRAVENOUS
Status: DISCONTINUED | OUTPATIENT
Start: 2020-05-03 | End: 2020-05-03

## 2020-05-03 RX ORDER — HALOPERIDOL 5 MG/ML
2.5-5 INJECTION INTRAMUSCULAR EVERY 4 HOURS PRN
Status: DISCONTINUED | OUTPATIENT
Start: 2020-05-03 | End: 2020-05-06 | Stop reason: HOSPADM

## 2020-05-03 RX ORDER — LEVETIRACETAM 5 MG/ML
500 INJECTION INTRAVASCULAR EVERY 12 HOURS
Status: DISCONTINUED | OUTPATIENT
Start: 2020-05-03 | End: 2020-05-03

## 2020-05-03 RX ORDER — MAGNESIUM SULFATE HEPTAHYDRATE 40 MG/ML
4 INJECTION, SOLUTION INTRAVENOUS EVERY 4 HOURS PRN
Status: DISCONTINUED | OUTPATIENT
Start: 2020-05-03 | End: 2020-05-03 | Stop reason: DRUGHIGH

## 2020-05-03 RX ORDER — FLUMAZENIL 0.1 MG/ML
0.2 INJECTION, SOLUTION INTRAVENOUS
Status: DISCONTINUED | OUTPATIENT
Start: 2020-05-03 | End: 2020-05-06 | Stop reason: HOSPADM

## 2020-05-03 RX ORDER — POTASSIUM CHLORIDE 1500 MG/1
20-40 TABLET, EXTENDED RELEASE ORAL
Status: DISCONTINUED | OUTPATIENT
Start: 2020-05-03 | End: 2020-05-06 | Stop reason: HOSPADM

## 2020-05-03 RX ORDER — GABAPENTIN 300 MG/1
900 CAPSULE ORAL EVERY 8 HOURS
Status: DISCONTINUED | OUTPATIENT
Start: 2020-05-03 | End: 2020-05-06 | Stop reason: HOSPADM

## 2020-05-03 RX ORDER — DEXTROSE MONOHYDRATE 100 MG/ML
INJECTION, SOLUTION INTRAVENOUS CONTINUOUS PRN
Status: DISCONTINUED | OUTPATIENT
Start: 2020-05-03 | End: 2020-05-06 | Stop reason: HOSPADM

## 2020-05-03 RX ORDER — POTASSIUM CHLORIDE 29.8 MG/ML
20 INJECTION INTRAVENOUS
Status: DISCONTINUED | OUTPATIENT
Start: 2020-05-03 | End: 2020-05-03 | Stop reason: DRUGHIGH

## 2020-05-03 RX ORDER — HEPARIN SODIUM,PORCINE 10 UNIT/ML
2-5 VIAL (ML) INTRAVENOUS
Status: DISCONTINUED | OUTPATIENT
Start: 2020-05-03 | End: 2020-05-04

## 2020-05-03 RX ORDER — ADENOSINE 3 MG/ML
12 INJECTION, SOLUTION INTRAVENOUS ONCE
Status: COMPLETED | OUTPATIENT
Start: 2020-05-03 | End: 2020-05-03

## 2020-05-03 RX ORDER — HYDROMORPHONE HYDROCHLORIDE 1 MG/ML
0.5 INJECTION, SOLUTION INTRAMUSCULAR; INTRAVENOUS; SUBCUTANEOUS
Status: DISCONTINUED | OUTPATIENT
Start: 2020-05-03 | End: 2020-05-03

## 2020-05-03 RX ORDER — LORAZEPAM 2 MG/ML
0.5 INJECTION INTRAMUSCULAR EVERY 4 HOURS PRN
Status: DISCONTINUED | OUTPATIENT
Start: 2020-05-03 | End: 2020-05-06 | Stop reason: HOSPADM

## 2020-05-03 RX ORDER — NICOTINE POLACRILEX 4 MG
15-30 LOZENGE BUCCAL
Status: DISCONTINUED | OUTPATIENT
Start: 2020-05-03 | End: 2020-05-06 | Stop reason: HOSPADM

## 2020-05-03 RX ORDER — MAGNESIUM SULFATE HEPTAHYDRATE 40 MG/ML
4 INJECTION, SOLUTION INTRAVENOUS EVERY 4 HOURS PRN
Status: DISCONTINUED | OUTPATIENT
Start: 2020-05-03 | End: 2020-05-03

## 2020-05-03 RX ORDER — ADENOSINE 3 MG/ML
INJECTION, SOLUTION INTRAVENOUS
Status: DISCONTINUED
Start: 2020-05-03 | End: 2020-05-03 | Stop reason: HOSPADM

## 2020-05-03 RX ORDER — POTASSIUM CHLORIDE 29.8 MG/ML
20 INJECTION INTRAVENOUS
Status: DISCONTINUED | OUTPATIENT
Start: 2020-05-03 | End: 2020-05-06 | Stop reason: HOSPADM

## 2020-05-03 RX ORDER — ACETAMINOPHEN 325 MG/1
325-650 TABLET ORAL EVERY 6 HOURS PRN
COMMUNITY
End: 2020-10-21

## 2020-05-03 RX ORDER — ADENOSINE 3 MG/ML
6 INJECTION, SOLUTION INTRAVENOUS ONCE
Status: DISCONTINUED | OUTPATIENT
Start: 2020-05-03 | End: 2020-05-03

## 2020-05-03 RX ORDER — PIPERACILLIN SODIUM, TAZOBACTAM SODIUM 3; .375 G/15ML; G/15ML
3.38 INJECTION, POWDER, LYOPHILIZED, FOR SOLUTION INTRAVENOUS EVERY 6 HOURS
Status: DISCONTINUED | OUTPATIENT
Start: 2020-05-03 | End: 2020-05-06 | Stop reason: HOSPADM

## 2020-05-03 RX ORDER — POTASSIUM CHLORIDE 7.45 MG/ML
10 INJECTION INTRAVENOUS
Status: DISCONTINUED | OUTPATIENT
Start: 2020-05-03 | End: 2020-05-03 | Stop reason: DRUGHIGH

## 2020-05-03 RX ORDER — LANOLIN ALCOHOL/MO/W.PET/CERES
100 CREAM (GRAM) TOPICAL 3 TIMES DAILY
Status: DISCONTINUED | OUTPATIENT
Start: 2020-05-05 | End: 2020-05-06 | Stop reason: HOSPADM

## 2020-05-03 RX ORDER — AMOXICILLIN 250 MG
2 CAPSULE ORAL 2 TIMES DAILY PRN
Status: DISCONTINUED | OUTPATIENT
Start: 2020-05-03 | End: 2020-05-06 | Stop reason: HOSPADM

## 2020-05-03 RX ORDER — LORAZEPAM 2 MG/ML
1 INJECTION INTRAMUSCULAR ONCE
Status: COMPLETED | OUTPATIENT
Start: 2020-05-03 | End: 2020-05-03

## 2020-05-03 RX ORDER — POLYETHYLENE GLYCOL 3350 17 G/17G
17 POWDER, FOR SOLUTION ORAL DAILY PRN
Status: DISCONTINUED | OUTPATIENT
Start: 2020-05-03 | End: 2020-05-06 | Stop reason: HOSPADM

## 2020-05-03 RX ORDER — POTASSIUM CHLORIDE 1500 MG/1
20-40 TABLET, EXTENDED RELEASE ORAL
Status: DISCONTINUED | OUTPATIENT
Start: 2020-05-03 | End: 2020-05-03 | Stop reason: DRUGHIGH

## 2020-05-03 RX ORDER — GABAPENTIN 100 MG/1
100 CAPSULE ORAL EVERY 8 HOURS
Status: DISCONTINUED | OUTPATIENT
Start: 2020-05-10 | End: 2020-05-06 | Stop reason: HOSPADM

## 2020-05-03 RX ORDER — AMOXICILLIN 250 MG
1 CAPSULE ORAL 2 TIMES DAILY PRN
Status: DISCONTINUED | OUTPATIENT
Start: 2020-05-03 | End: 2020-05-06 | Stop reason: HOSPADM

## 2020-05-03 RX ORDER — NICOTINE POLACRILEX 4 MG
15-30 LOZENGE BUCCAL
Status: DISCONTINUED | OUTPATIENT
Start: 2020-05-03 | End: 2020-05-03

## 2020-05-03 RX ORDER — FOLIC ACID 5 MG/ML
1 INJECTION, SOLUTION INTRAMUSCULAR; INTRAVENOUS; SUBCUTANEOUS ONCE
Status: COMPLETED | OUTPATIENT
Start: 2020-05-03 | End: 2020-05-03

## 2020-05-03 RX ORDER — FOLIC ACID 1 MG/1
1 TABLET ORAL DAILY
Status: DISCONTINUED | OUTPATIENT
Start: 2020-05-06 | End: 2020-05-06 | Stop reason: HOSPADM

## 2020-05-03 RX ORDER — LORAZEPAM 2 MG/ML
1-2 INJECTION INTRAMUSCULAR EVERY 30 MIN PRN
Status: DISCONTINUED | OUTPATIENT
Start: 2020-05-03 | End: 2020-05-06 | Stop reason: HOSPADM

## 2020-05-03 RX ORDER — LEVETIRACETAM 5 MG/ML
500 INJECTION INTRAVASCULAR ONCE
Status: COMPLETED | OUTPATIENT
Start: 2020-05-03 | End: 2020-05-03

## 2020-05-03 RX ADMIN — HUMAN ALBUMIN MICROSPHERES AND PERFLUTREN 9 ML: 10; .22 INJECTION, SOLUTION INTRAVENOUS at 14:25

## 2020-05-03 RX ADMIN — ADENOSINE 6 MG: 3 INJECTION, SOLUTION INTRAVENOUS at 12:11

## 2020-05-03 RX ADMIN — MULTIPLE VITAMINS W/ MINERALS TAB 1 TABLET: TAB at 08:40

## 2020-05-03 RX ADMIN — SODIUM CHLORIDE 60 ML: 9 INJECTION, SOLUTION INTRAVENOUS at 04:19

## 2020-05-03 RX ADMIN — DEXTROSE AND SODIUM CHLORIDE 1000 ML: 5; 450 INJECTION, SOLUTION INTRAVENOUS at 04:26

## 2020-05-03 RX ADMIN — LEVETIRACETAM 500 MG: 5 INJECTION INTRAVENOUS at 08:40

## 2020-05-03 RX ADMIN — SODIUM CHLORIDE 5 UNITS: 9 INJECTION, SOLUTION INTRAVENOUS at 03:30

## 2020-05-03 RX ADMIN — LORAZEPAM 0.5 MG: 2 INJECTION INTRAMUSCULAR; INTRAVENOUS at 19:52

## 2020-05-03 RX ADMIN — LORAZEPAM 1 MG: 2 INJECTION INTRAMUSCULAR; INTRAVENOUS at 02:30

## 2020-05-03 RX ADMIN — FOLIC ACID 1 MG: 5 INJECTION, SOLUTION INTRAMUSCULAR; INTRAVENOUS; SUBCUTANEOUS at 10:04

## 2020-05-03 RX ADMIN — SODIUM CHLORIDE, POTASSIUM CHLORIDE, SODIUM LACTATE AND CALCIUM CHLORIDE 1000 ML: 600; 310; 30; 20 INJECTION, SOLUTION INTRAVENOUS at 02:45

## 2020-05-03 RX ADMIN — MAGNESIUM SULFATE IN WATER 4 G: 40 INJECTION, SOLUTION INTRAVENOUS at 13:51

## 2020-05-03 RX ADMIN — POTASSIUM CHLORIDE 20 MEQ: 29.8 INJECTION, SOLUTION INTRAVENOUS at 19:56

## 2020-05-03 RX ADMIN — ADENOSINE 12 MG: 3 INJECTION, SOLUTION INTRAVENOUS at 12:15

## 2020-05-03 RX ADMIN — LORAZEPAM 1 MG: 1 TABLET ORAL at 08:40

## 2020-05-03 RX ADMIN — THIAMINE HYDROCHLORIDE 200 MG: 100 INJECTION, SOLUTION INTRAMUSCULAR; INTRAVENOUS at 16:27

## 2020-05-03 RX ADMIN — PIPERACILLIN AND TAZOBACTAM 3.38 G: 3; .375 INJECTION, POWDER, FOR SOLUTION INTRAVENOUS at 15:43

## 2020-05-03 RX ADMIN — SODIUM CHLORIDE, POTASSIUM CHLORIDE, SODIUM LACTATE AND CALCIUM CHLORIDE 1000 ML: 600; 310; 30; 20 INJECTION, SOLUTION INTRAVENOUS at 06:10

## 2020-05-03 RX ADMIN — SODIUM CHLORIDE, POTASSIUM CHLORIDE, SODIUM LACTATE AND CALCIUM CHLORIDE 1000 ML: 600; 310; 30; 20 INJECTION, SOLUTION INTRAVENOUS at 01:38

## 2020-05-03 RX ADMIN — DEXTROSE AND SODIUM CHLORIDE 1000 ML: 5; 450 INJECTION, SOLUTION INTRAVENOUS at 09:00

## 2020-05-03 RX ADMIN — POTASSIUM PHOSPHATE, MONOBASIC AND POTASSIUM PHOSPHATE, DIBASIC 25 MMOL: 224; 236 INJECTION, SOLUTION INTRAVENOUS at 14:03

## 2020-05-03 RX ADMIN — LEVETIRACETAM 1000 MG: 10 INJECTION INTRAVENOUS at 19:30

## 2020-05-03 RX ADMIN — LORAZEPAM 1 MG: 2 INJECTION INTRAMUSCULAR; INTRAVENOUS at 16:54

## 2020-05-03 RX ADMIN — LIDOCAINE HYDROCHLORIDE ANHYDROUS 1 ML: 10 INJECTION, SOLUTION INFILTRATION at 15:15

## 2020-05-03 RX ADMIN — PIPERACILLIN AND TAZOBACTAM 3.38 G: 3; .375 INJECTION, POWDER, FOR SOLUTION INTRAVENOUS at 20:40

## 2020-05-03 RX ADMIN — GABAPENTIN 1200 MG: 600 TABLET, FILM COATED ORAL at 08:40

## 2020-05-03 RX ADMIN — FUROSEMIDE 20 MG: 10 INJECTION, SOLUTION INTRAMUSCULAR; INTRAVENOUS at 19:52

## 2020-05-03 RX ADMIN — ONDANSETRON 4 MG: 2 INJECTION INTRAMUSCULAR; INTRAVENOUS at 01:38

## 2020-05-03 RX ADMIN — POTASSIUM PHOSPHATE, MONOBASIC AND POTASSIUM PHOSPHATE, DIBASIC 20 MMOL: 224; 236 INJECTION, SOLUTION INTRAVENOUS at 23:56

## 2020-05-03 RX ADMIN — TOPICAL ANESTHETIC 0.5 ML: 200 SPRAY DENTAL; PERIODONTAL at 08:39

## 2020-05-03 RX ADMIN — THIAMINE HYDROCHLORIDE 200 MG: 100 INJECTION, SOLUTION INTRAMUSCULAR; INTRAVENOUS at 22:18

## 2020-05-03 RX ADMIN — DEXTROSE AND SODIUM CHLORIDE 1000 ML: 5; 450 INJECTION, SOLUTION INTRAVENOUS at 22:56

## 2020-05-03 RX ADMIN — THIAMINE HYDROCHLORIDE 200 MG: 100 INJECTION, SOLUTION INTRAMUSCULAR; INTRAVENOUS at 08:40

## 2020-05-03 RX ADMIN — LORAZEPAM 0.5 MG: 2 INJECTION INTRAMUSCULAR; INTRAVENOUS at 05:37

## 2020-05-03 RX ADMIN — SODIUM CHLORIDE 5 UNITS/HR: 9 INJECTION, SOLUTION INTRAVENOUS at 03:38

## 2020-05-03 RX ADMIN — LEVETIRACETAM 500 MG: 5 INJECTION INTRAVENOUS at 10:04

## 2020-05-03 RX ADMIN — CALCIUM GLUCONATE 2 G: 98 INJECTION, SOLUTION INTRAVENOUS at 03:11

## 2020-05-03 RX ADMIN — PANTOPRAZOLE SODIUM 40 MG: 40 INJECTION, POWDER, FOR SOLUTION INTRAVENOUS at 08:40

## 2020-05-03 RX ADMIN — IOPAMIDOL 65 ML: 755 INJECTION, SOLUTION INTRAVENOUS at 04:19

## 2020-05-03 ASSESSMENT — ENCOUNTER SYMPTOMS
HEMATURIA: 0
VOICE CHANGE: 0
SEIZURES: 0
DYSURIA: 0
BLOOD IN STOOL: 0
COUGH: 0
NAUSEA: 1
FATIGUE: 1
LIGHT-HEADEDNESS: 1
DIARRHEA: 0
DIAPHORESIS: 1
FEVER: 0
ABDOMINAL PAIN: 1
DIZZINESS: 1
PALPITATIONS: 0
CHILLS: 1
APPETITE CHANGE: 1
SHORTNESS OF BREATH: 1
VOMITING: 1
WEAKNESS: 0
SORE THROAT: 1

## 2020-05-03 ASSESSMENT — ACTIVITIES OF DAILY LIVING (ADL)
ADLS_ACUITY_SCORE: 10
ADLS_ACUITY_SCORE: 12

## 2020-05-03 ASSESSMENT — MIFFLIN-ST. JEOR
SCORE: 1298.81
SCORE: 1265.13

## 2020-05-03 NOTE — ED TRIAGE NOTES
Patient states that she has been nauseated and vomiting for the last day. Patient states that she had loose watery stools for the last day and has not been urinating much. Patient states that she has not been able to eat for the last day and has been vomiting coffee ground color emesis X2

## 2020-05-03 NOTE — ED NOTES
[facility]  ED Nurse Handoff Report    ED Chief complaint: Hematemesis (nvX 2 days, cough, sore throat, unable to eat)      ED Diagnosis:   Final diagnoses:   Ketoacidosis   Hyperglycemia   Hyperkalemia   Wide-complex tachycardia (H)   Alcohol use       Code Status: Full Code    Allergies:   Allergies   Allergen Reactions     Asa [Aspirin]      Hydrocodone        Patient Story: NV X2 days Diarrhea X1, unable to eat, Chills, CP, SOB, coughing, Hx non hodgkins lymphoma in remission, has not had alcohol for 4 days  Focused Assessment:  Nausea, vomiting throat pain, cough, tachycardia, tachypnea (on arrival), confusion, hyperglycemia, (possible alcoholic acidosis)    Treatments and/or interventions provided: Lactated Ringers 2L, 500cc normal saline, 5 unit(s) reg insulin followed by Insulin drip, d5 45 saline @250/hr, calcium gluconate, zofran 12 mg, ativan 1mg  Patient's response to treatments and/or interventions:  Lactic acid 7 down to 4, potassium 6.7 down to 4.5, glucose 356 down to 138.    To be done/followed up on inpatient unit:  accuchecks, awaiting covid    Does this patient have any cognitive concerns?: confusion on arrival but mentation improving with treatment    Activity level - Baseline/Home:  Independent  Activity Level - Current:   Independent    Patient's Preferred language: English   Needed?: No    Isolation: Contact   Infection:     Bariatric?: No    Vital Signs:   Vitals:    05/03/20 0400 05/03/20 0430 05/03/20 0445 05/03/20 0500   BP: (!) 122/90 (!) 128/103 (!) 136/109 (!) 128/98   Pulse: 128 132 134 135   Resp: 23 20 27 20   Temp:       TempSrc:       SpO2: 99% 99% 99% 99%   Weight:       Height:           Cardiac Rhythm:     Was the PSS-3 completed:   Yes  What interventions are required if any? none               Family Comments: none here  OBS brochure/video discussed/provided to patient/family: N/A              Name of person given brochure if not patient:                Relationship to patient:     For the majority of the shift this patient's behavior was Green.   Behavioral interventions performed were none.    ED NURSE PHONE NUMBER: [phone]

## 2020-05-03 NOTE — PLAN OF CARE
At the recommendation of the medical team, OT will be held while test pending for COVID-19. Will re-assess status daily.

## 2020-05-03 NOTE — ED PROVIDER NOTES
"  History     Chief Complaint:  Hematemesis (nvX 2 days, cough, sore throat, unable to eat)       HPI   Darya Hamilton is a 24 year old female with a history of alcohol use, Hodgkin's lymphoma and seizures who presents for evaluation of chest pain, abdominal pain and persistent nausea vomiting and malaise.  Patient denies any fever.  She notes that she drinks alcohol 3 to 4 days a week and has \"5-7 shots at a time\" and other times drinks only wine.  She reports that her symptoms started 4 days ago and were mild for the first 2 days before significantly increasing yesterday and today to the point where she is no longer able to tolerate any oral intake whatsoever.  She reports vomiting at home and today noted that there appeared to be some red blood in her vomit.  She denies any diarrhea, bright red blood per rectum or melena.  She reports pain in the epigastric region radiating up into her chest as well as around her sides and back.  She reports that she has had episodes like this in the past but never as severe.  She denies any illicit substance use or marijuana use.  She denies any falls or injuries.    Allergies:  Aspirin  Hydrocodone    Medications:   Keppra    Past Medical History:    Hodgkin's lymphoma  Seizure   Gallstones    Past Surgical History:    Cholecystectomy    Biopsy lymph node    Family History:    Mother: asthma    Social History:  The patient presents unaccompanied to the ED.  PCP: No Ref-Primary, Physician  Smoking status: Never Smoker  Smokeless tobacco: Never Used  Alcohol use: Positive  Drug use: Negative    Review of Systems   Constitutional: Positive for appetite change, chills, diaphoresis and fatigue. Negative for fever.   HENT: Positive for sore throat. Negative for voice change.    Respiratory: Positive for shortness of breath. Negative for cough.    Cardiovascular: Positive for chest pain. Negative for palpitations and leg swelling.   Gastrointestinal: Positive for abdominal pain, " "nausea and vomiting. Negative for blood in stool and diarrhea.   Genitourinary: Positive for decreased urine volume. Negative for dysuria and hematuria.   Skin: Negative for rash.   Neurological: Positive for dizziness and light-headedness. Negative for seizures, syncope and weakness.   All other systems reviewed and are negative.        Physical Exam     Patient Vitals for the past 24 hrs:   BP Temp Temp src Pulse Heart Rate Resp SpO2 Height Weight   05/03/20 0530 (!) 132/92 -- -- 131 135 25 99 % -- --   05/03/20 0515 (!) 137/107 -- -- 133 138 21 98 % -- --   05/03/20 0500 (!) 128/98 -- -- 135 144 20 99 % -- --   05/03/20 0445 (!) 136/109 -- -- 134 133 27 99 % -- --   05/03/20 0430 (!) 128/103 -- -- 132 -- 20 99 % -- --   05/03/20 0400 (!) 122/90 -- -- 128 124 23 99 % -- --   05/03/20 0345 (!) 130/93 -- -- 133 134 18 99 % -- --   05/03/20 0330 (!) 129/96 -- -- 141 152 (!) 34 99 % -- --   05/03/20 0315 120/82 -- -- 146 141 25 95 % -- --   05/03/20 0300 126/79 -- -- 149 146 26 100 % -- --   05/03/20 0245 (!) 119/99 -- -- 158 144 28 100 % -- --   05/03/20 0230 127/65 -- -- 142 140 19 (!) 85 % -- --   05/03/20 0215 (!) 132/98 -- -- 134 137 19 98 % -- --   05/03/20 0200 (!) 103/96 -- -- 141 142 29 98 % -- --   05/03/20 0145 123/83 -- -- 123 133 (!) 33 98 % -- --   05/03/20 0115 -- -- -- -- 152 30 94 % -- --   05/03/20 0105 (!) 111/91 -- -- 152 147 27 99 % -- --   05/03/20 0104 -- -- -- -- 142 -- -- -- --   05/03/20 0057 (!) 111/91 98.1  F (36.7  C) Oral 142 -- -- 97 % 1.6 m (5' 3\") 59 kg (130 lb)   05/03/20 0055 132/89 -- -- 136 -- 27 -- -- --       Physical Exam  General: Patient actively retching appears uncomfortable.  Nontoxic.  Head:  Scalp, face, and head appear normal  Eyes:  Pupils are equal, round, and reactive to light    Conjunctivae non-injected and sclerae white  ENT:    The external nose is normal    Pinnae are normal    The oropharynx is normal, mucous membranes moist    Posterior pharynx mildly " erythematous without any evidence of bleeding, exudates or swelling.    Uvula is in the midline  Neck:  Normal range of motion    There is no rigidity noted    Trachea is in the midline  CV:  Tachycardic rate, regular rhythm    Normal S1/S2, no S3/S4    No murmur or rub. Radial pulses 2+ bilaterally   Resp:  Lungs are clear and equal bilaterally    There is no tachypnea    No increased work of breathing    No rales, wheezing, or rhonchi  GI:  Abdomen is soft, no rigidity or guarding    No distension, or mass    Patient reports mild diffuse tenderness. No rebound tenderness   MS:  Normal muscular tone    Symmetric motor strength    No lower extremity edema  Skin:  No rash or acute skin lesions noted  Neuro: Awake and alert oriented to person and place.  Mentation intermittently appears slowed with patient having difficulty remembering details of her recent history.  Cranial nerves II through XII intact.  Strength 5-5 and intact throughout all extremities.  Patient responds to tactile stimuli in all extremities.    Speech is normal and fluent    Moves all extremities spontaneously  Psych:  Normal affect.  Appropriate interactions.      Emergency Department Course   ECG:  Time: 0113  Vent. Rate 153 bpm. OK interval 112. QRS duration 86. QT/QTc 326/520. P-R-T axis * 59 48.  Sinus tachycardia  OTc prolonged  Read time: 0115    ECG 2:  Time: 0145  Vent. Rate 132 bpm. OK interval *. QRS duration 140. QT/QTc 392/580. P-R-T axis * 153 117.  Suspect arm lead reversal, interpretation assumes no reversal  Wide QRS tachycardia with premature ventricular complexes or fusion complexes  Nonspecific intraventricular block  Anterolateral infarct, age undetermined  Abnormal ECG  Limb leads reversed  Wide complex tachycardia   Read time: 0206    ECG 3:  Time: 0147  Vent. Rate 127 bpm. OK interval 126. QRS duration 84. QT/QTc 310/450. P-R-T axis 46 56 37.  Sinus tachycardia   Otherwise normal ECG  QTc normalized  Read time:  0205    Imaging:   CT Chest/Abdomen/Pelvis w Contrast  IMPRESSION:  1.  No infiltrate or pleural effusion.  2.  No bowel obstruction or abscess.  3.  Distal esophageal wall thickening not excluded. Further characterization is indicated consider follow-up esophagram.  4.  Underdistention of colon reduces evaluation for colitis. Reading per radiology.     Laboratory:  Laboratory findings were communicated with the patient who voiced understanding of the findings.    CBC: WBC: 13.1 (H), HGB 16.8 (H), PLT: 338    CMP: Glucose 356 (H), Sodium 127 (L), Potassium 6.1 (H), Chloride 93 (L), Carbon Dioxide 8 (L), Anion Gap 26 (H), Calcium 8.4 (L), Albumin 5.1 (H), Protein Total 10.2 (H), AST 68 (H), o/w WNL (Creatinine: 0.96)    Lactic Acid (Collected 0144): 7.1 (H)  Lactic Acid (Collected 0437): 4.0 (H)    Troponin (Collected 0145): <0.015     INR: 1.26 (H)    PTT: 23    TSH with free T4 reflex: 4.31 (H)    T4 free: 0.86    HCG Qualitative: Negative     Lipase: 141    Magnesium: 1.7    Phosphorous: 4.8 (H)    Hemoglobin A1c: 4.6    CK total: 191    Ketone Beta-Hydroxybutyrate Quantitative: 6.1 (H)    Blood gas venous and oxyhgb (Collected 0319): Ph Venous 7.15 (L), PCO2 Venous 26 (L), PO2 Venous 26 (L), PO2 Venous 39, Bicarbonate 9 (L), FIO2 room air, Oxyhemoglobin 68, Base Deficit 18.1     Blood gas venous and oxyhgb (Collected 0618): Ph Venous 7.29 (L), PCO2 Venous 31(L), PO2 Venous 36, PO2 Venous 36, Bicarbonate 15 (L), FIO2 room air, Oxyhemoglobin 69, Base Deficit 10.4      ABO/Rh type and screen: ABO B, RH(D) Pos, Antibody Screen Neg     Alcohol ethyl: <0.01    Keppra (Levetriacetam) level: pending    Glucose (Collected 0423): 138 (H)  Glucose (Collected 0437): 140 (H)  Glucose (Collected 0532): 181 (H)  Glucose (Collected 0626): 194 (H)    BMP (Collected 0437): Glucose 140 (H), Sodium 132 (L), Carbon Dioxide 13 (L), Anion Gap 18 (H), Glucose 140 (H), o/w WNL (Creatinine: 0.73)    COVID-19 Virus (Coronavirus) by PCR  Nasopharyngeal swab: pending     Interventions:  0138 Lactated ringers 1000 mL IV Bolus  0138 Zofran 4 mg IV   0230 Ativan 1 mg IV  0311 Calcium gluconate 2 g IV   0330 Insulin 5 units IV  0338 Insulin 5 units/hr IV  0431 Insulin 0.5 units/hr IV (rate/dose change)  0426 Dextrose 5% and 0.45% NaCl 1000 mL IV  0536 Insulin 1.5 units/hr (rate/dose change)  0537 Ativan 0.5 mg IV  0610 Lactated ringers 1000 mL IV Bolus     Emergency Department Course:  Past medical records, nursing notes, and vitals reviewed.   0114 I performed an exam of the patient and obtained history, as documented above.    EKG x3 obtained in the ED, see results above.     The patient was sent for a CT Chest/Abdomen/Pelvis while in the emergency department, results above.      IV was inserted and blood was drawn for laboratory testing, results above.     The patient provided a urine sample here in the emergency department. This was sent for laboratory testing, findings above.     The patient was tested for COVID-19 while in the emergency department, results pending.      Medication and IV fluids administered.     I rechecked and explained findings to the patient.    I spoke with Dr. Baez, hospitalist, who agreed to admit the patient.     Findings and plan explained to the patient who consents to admission. Discussed the patient with Dr. Baez, who will admit the patient to a ICU (Covid rule out) bed for further monitoring, evaluation, and treatment.     I personally reviewed the laboratory and imaging results with the patient and answered all related questions prior to admission.     Impression & Plan      CMS Diagnoses: The Lactic acid level is elevated due to intractable vomiting and acidosis, at this time there is no sign of severe sepsis or septic shock.     Covid-19  Darya Hamilton was evaluated during a global COVID-19 pandemic, which necessitated consideration that the patient might be at risk for infection with the SARS-CoV-2 virus that  causes COVID-19.   Applicable protocols for evaluation were followed during the patient's care.   COVID-19 was considered as part of the patient's evaluation. The plan for testing is:  a test was obtained during this visit.    Medical Decision Making:  Darya Hamilton is a 24 year old female who presents with multiple symptoms including chest pain abdominal pain back pain and intractable nausea and vomiting.  She also reports an episode of hematemesis however she is not having any hematemesis or coffee-ground emesis observed in the emergency department.  Abdominal exam is without evidence of peritonitis or acute surgical emergency.  She has no abdominal distention.  A very broad differential diagnosis is considered including bowel obstruction, volvulus, pancreatitis, gastritis, peptic ulcer disease, Kayla-Edwards tear, Boerhaave syndrome, esophageal varices, DKA, alcoholic ketoacidosis, starvation ketosis.  Infectious etiologies were considered but felt to be like clinically less likely.  Given the ongoing coronavirus pandemic COVID-19 testing was sent but her symptoms would be atypical for this.  A broad lead ED work-up was undertaken.  This was remarkable for multiple severe metabolic disturbances including hyperkalemia, hyponatremia, hyperglycemia severe metabolic acidosis with a pH of 7.15, PCO2 of 26.  Bicarb of 8.  hCG is negative.  TSH is mildly elevated with a normal free T4.  CBC with elevated white blood cell count of 13.1 hemoglobin elevated at 16.8.  Alcohol level negative.  While in the emergency department the patient had significant tachycardia with several episodes initially of cardiac rhythm disturbance.  She appeared to have intermittent episodes of supraventricular tachycardia with narrow complexes and rates as high as 180 these were self-limited although patient did appear to be symptomatic with these with increased dizziness and lightheadedness.  In addition she had 1 episode lasting 2 to 3  minutes of wide-complex tachycardia which was again self-limited and she remained hemodynamically stable otherwise during these episodes.  I feel that these were likely secondary to to her hyperkalemia. Following treatment of her hyperkalemia no more dysrhythmias were observed in the ED.     In regards to her hyperglycemia and ketoacidosis she was started on insulin. IVF resuscitation, and her lactate and metabolic disturbances began to improve. The case was discussed with the hospitalist and she will be admitted to the ICU for ongoing monitoring, evaluation and treatment. Patient admitted in stable and improving condition.      Diagnosis:    ICD-10-CM    1. Ketoacidosis  E87.2 Lactic acid     Basic metabolic panel     Glucose by meter     Glucose by meter     CK total     CK total     Glucose by meter     Glucose by meter     COVID-19 Virus (Coronavirus) by PCR Nasopharyngeal swab     Blood gas venous with oxyhemoglobin     CANCELED: CK total   2. Hyperglycemia  R73.9    3. Hyperkalemia  E87.5    4. Wide-complex tachycardia (H)  I47.2    5. Alcohol use  Z72.89         Disposition:  Admitted to ICU.      Scribe Disclosure:  Muriel PHAM, am serving as a scribe at 1:14 AM on 5/3/2020 to document services personally performed by Shivam Huynh MD based on my observations and the provider's statements to me.     5/3/2020   Shivam Mack MD  05/03/20 9908

## 2020-05-03 NOTE — PROGRESS NOTES
Patient reassessed now that COVID-19 Negative.     On BIPAP, O2 95%, HR improved to 140s while sleeping, appears sinus /79, Afeb. HR increases to 160s when awake.     Awakes to voice. Denies chest pain, though some pain with coughing. No current nausea. No further history obtained.     Gen: Lying in bed, mild distress. Sometime pulling at BIPAP.   Neck: Supple, unable to assess JVD  Lungs: CTAB, Bipap.   Heart: Tachy, S1 and S2. No murmur appreaciated.   Abdomen: Soft, non tender.   Skin: no rashes appreciated. No edema.     Continue to replete electrolytes as you are doing. Support oxygen status. ECGs and Tele most consistent with sinus tachycardia. Likely compensatory at this point. Would not treat with betablocker at this time. Would ensure fully rule out infection. Also with some pleuritic pain (? Esophageal, PE, vs. Pain from recent vomiting). Defer treatment of possible withdrawal to Primary.     She does have cardiomyopathy on echo, with apical hypokinesis and basal preservation. Would fit with stress cardiomyopathy most likely. Trop minorly elevated to 0.7 which could also fit this picture. ECGs have not been concerning for any ST changes, though some TWI in the anterior and lateral leads which could also correspond. Cannot fully rule our ETOH cardiomyopathy, though did not present with heart failure picture so less likely.     Case discussed with Dr. Negro who also evaluated patient previously.     Gurdeep Cool M.D.

## 2020-05-03 NOTE — CONSULTS
Intensivist consult:    We are consulted by Dr. Ojeda of the hospitalist service for tachycardia and hypoxemia.     24F admitted night of 5/2 for nausea/vomiting, concern for alcohol withdrawal. I was called emergently to the bedside by nursing staff for sudden increase of her heart rate from 130-140s range to 160-170s range accompanied by tachypnea and hypoxemia.  We immediately increased her oxygen to 15L from wall o2, but even with this her sats remained in the low 90s.  Her heartrate was in the 160-170 range, quite regular, and without obvious p-waves on either ekg or the monitor.  Her respiratory rate was high 20s/low30s and unlabored.      I discussed her pulse rate at length with Dr. Cool of the cardiology service.  They were unsure as to her rhythm, but did agree to trial pushes of adenosine.  6 mg of adenosine was pushed at bedside with little effect.  12 mg was subsequently pushed which reduced her rate to 100 still without obvious p-waves on ekg and her rate subsequently returned to 160 in short order.  She remained hemodynamically stable through all of this with blood pressures in the 120/80 range.  A life-ernestina was kept in place.  (see separate documentation).  Her magnesium was subsequently determined to be 1.1 and phos low as well--these were aggressively repleted.    For her tachypnea and hypoxemia she was started on bipap with good effect on both of these.  I obtained a chest xray which was relatively clear.  I also empirically started zosyn in case of possible occult aspiration event with all of her recent vomiting.  This can be dc'ed if her clinical condition improves and her She does not appear volume overloaded to me.    She denies shortness of breath to me.  Denies chest pain, dizzyness, lightheadedness, and abdominal pain.    Pmh/psh/meds/all/sh/fh reviewed as as noted below.    ROS:  Negative on 10-point ROS except as noted above.    PE:  /83   Pulse 154   Temp 99.2  F (37.3  C)  "(Oral)   Resp (!) 34   Ht 1.6 m (5' 3\")   Wt 55.6 kg (122 lb 9.2 oz)   SpO2 96%   BMI 21.71 kg/m    On bipap 50%  Gen:  Mild distress // HEENT:  PERRL, nose/ears grossly normal // Neck:  Supple // Lymph : no cervical adenopathy // chest : CTA-B, unlabored // cor:  rrr no m/r/g // abd s/nt/nd // extr:  wwp x4, no edema // neuro:  Awake, alert, rapid fluent appropriate speech, good str/sens x4 // skin:  No obvious rash    Good UOP    Labs (personally reviewed/interpreted):  k ok 3.7, mag now low 1.1, phos low 0.8, creat ok 0.66, hgb stable 13.1.  Bicarb improving 19.  Lipase ok on admit.    CXR (personally reviewed/interpreted):  Possible mild infiltrates to b/l lower fields.  Overall not markedly changed from prior in 2018.    EKG (personally reviewed/interpreted):  Appears to be svt at 146.  Nl axis, nl int, no acute ischemic changes.    A/P:  1.  Acute hypoxemic respiratory failure, bipap-dependent:  sats drop to high 80s to low 90s even on 15L nc/fm.  Much improved with bipap.  Etiology of this unclear; CT lungs was clear and xray does not appear to have significant infiltrates.  Empirically starting zosyn for possible aspiration.  Not producing sputum but would send this if able.  Covid-19 negative, no other symptoms.  Checking echo to eval R sided pressures, eval for possible PE.     2.  Tachycardia, presumed SVT:  hemodyanmically stable for now.  Cardioversion with adenosine failed.  Repleting low mag and phos, then will re-assess.    3.  Hypomag:  Replete IV     4.  Hypophos:  Replete IV    5.  Hyperglycemia:  Etiology unclear-- no known h/o DM.  Continue insulin drip.    D/w Dr. Cool of cardiology and Dr. Ojeda of hospitalist service    I personally spent 85 minutes involved in providing critical care for this patient for the diagnoses noted above.  Excluding procedure time.     Past Medical History:   Diagnosis Date     Hodgkin's lymphoma (H)      Past Surgical History:   Procedure Laterality Date     " CHOLECYSTECTOMY       Current Facility-Administered Medications   Medication     acetaminophen (TYLENOL) tablet 650 mg    Or     acetaminophen (TYLENOL) solution 650 mg     adenosine (ADENOCARD) injection 12 mg     benzocaine 20% (HURRICAINE/TOPEX) 20 % spray 0.5-1 mL     bisacodyl (DULCOLAX) Suppository 10 mg     dextrose 10% infusion     dextrose 5% and 0.45% NaCl infusion     glucose gel 15-30 g    Or     dextrose 50 % injection 25-50 mL    Or     glucagon injection 1 mg     flumazenil (ROMAZICON) injection 0.2 mg     [START ON 5/6/2020] folic acid (FOLVITE) tablet 1 mg     [START ON 5/4/2020] folic acid injection 1 mg     [START ON 5/10/2020] gabapentin (NEURONTIN) capsule 100 mg     [START ON 5/8/2020] gabapentin (NEURONTIN) capsule 300 mg     [START ON 5/6/2020] gabapentin (NEURONTIN) capsule 600 mg     gabapentin (NEURONTIN) capsule 900 mg     haloperidol lactate (HALDOL) injection 2.5-5 mg     insulin 1 unit/mL in saline (NovoLIN, HumuLIN Regular) drip - ADULT IV Infusion     levETIRAcetam (KEPPRA) intermittent infusion 1,000 mg     LORazepam (ATIVAN) injection 0.5 mg     LORazepam (ATIVAN) tablet 1-2 mg    Or     LORazepam (ATIVAN) injection 1-2 mg     LORazepam (ATIVAN) injection 2 mg     magnesium sulfate 4 g in 100 mL sterile water (premade)     Medication instructions: Do NOT use nebulized medications     metoprolol (LOPRESSOR) injection 5 mg     multivitamin w/minerals (THERA-VIT-M) tablet 1 tablet     naloxone (NARCAN) injection 0.1-0.4 mg     pantoprazole (PROTONIX) 40 mg IV push injection     piperacillin-tazobactam (ZOSYN) 3.375 g vial to attach to  mL bag     polyethylene glycol (MIRALAX) Packet 17 g     potassium chloride (KLOR-CON) Packet 20-40 mEq     potassium chloride 10 mEq in 100 mL intermittent infusion with 10 mg lidocaine     potassium chloride 10 mEq in 100 mL sterile water intermittent infusion (premix)     potassium chloride 20 mEq in 50 mL intermittent infusion     potassium  chloride ER (KLOR-CON M) CR tablet 20-40 mEq     potassium phosphate 15 mmol in D5W 250 mL intermittent infusion     potassium phosphate 20 mmol in D5W 250 mL intermittent infusion     potassium phosphate 20 mmol in D5W 500 mL intermittent infusion     potassium phosphate 25 mmol in D5W 500 mL intermittent infusion     senna-docusate (SENOKOT-S/PERICOLACE) 8.6-50 MG per tablet 1 tablet    Or     senna-docusate (SENOKOT-S/PERICOLACE) 8.6-50 MG per tablet 2 tablet     thiamine (B-1) 200 mg in sodium chloride 0.9 % 50 mL intermittent infusion     [START ON 5/5/2020] vitamin B1 (THIAMINE) tablet 100 mg     [START ON 5/10/2020] vitamin B1 (THIAMINE) tablet 100 mg        Allergies   Allergen Reactions     Asa [Aspirin] Angioedema and Hives     Hydrocodone      Hallucinations     Social History     Socioeconomic History     Marital status: Single     Spouse name: Not on file     Number of children: Not on file     Years of education: Not on file     Highest education level: Not on file   Occupational History     Not on file   Social Needs     Financial resource strain: Not on file     Food insecurity     Worry: Not on file     Inability: Not on file     Transportation needs     Medical: Not on file     Non-medical: Not on file   Tobacco Use     Smoking status: Never Smoker     Smokeless tobacco: Never Used   Substance and Sexual Activity     Alcohol use: Yes     Comment: 1-2/week     Drug use: No     Sexual activity: Not on file   Lifestyle     Physical activity     Days per week: Not on file     Minutes per session: Not on file     Stress: Not on file   Relationships     Social connections     Talks on phone: Not on file     Gets together: Not on file     Attends Yazidi service: Not on file     Active member of club or organization: Not on file     Attends meetings of clubs or organizations: Not on file     Relationship status: Not on file     Intimate partner violence     Fear of current or ex partner: Not on file      Emotionally abused: Not on file     Physically abused: Not on file     Forced sexual activity: Not on file   Other Topics Concern     Not on file   Social History Narrative     Not on file     Family history reviewed and non-contributory.

## 2020-05-03 NOTE — ED NOTES
DATE:  5/3/2020   TIME OF RECEIPT FROM LAB:  0454  LAB TEST:  Lactic acid  LAB VALUE:  4.0  RESULTS GIVEN WITH READ-BACK TO (PROVIDER):  Dr. Huynh  TIME LAB VALUE REPORTED TO PROVIDER:   0454

## 2020-05-03 NOTE — ED NOTES
DATE:  5/3/2020   TIME OF RECEIPT FROM LAB:  0240  LAB TEST:  Potassium and CO2  LAB VALUE:  Potassium of 6.1 and CO2 8  RESULTS GIVEN WITH READ-BACK TO (PROVIDER):  Shivam Huynh MD  TIME LAB VALUE REPORTED TO PROVIDER:   0242

## 2020-05-03 NOTE — ED NOTES
DATE:  5/3/2020   TIME OF RECEIPT FROM LAB: 0334  LAB TEST:  Ketone; venous pH, bicarb 9  LAB VALUE:  6.1; 7.15; 9  RESULTS GIVEN WITH READ-BACK TO (PROVIDER):  Dr. Huynh  TIME LAB VALUE REPORTED TO PROVIDER:   0332

## 2020-05-03 NOTE — PHARMACY-ADMISSION MEDICATION HISTORY
Pharmacy Medication History  Admission medication history interview status for the 5/3/2020  admission is complete. See EPIC admission navigator for prior to admission medications     Medication history sources: Patient and Care Everywhere  Medication history source reliability: Good  Adherence assessment: Good    Significant changes made to the medication list:    Patient prescribed on 2/19/20 levetiracetam 500 mg BID x10 days, then increase to 750 mg BID x10 days, then increase to 1000 mg BID    Changes:  -levetiracetam 500 mg BID -> 1000 mg BID    Medication reconciliation completed by provider prior to medication history? Yes    Time spent in this activity: 10 minutes      Prior to Admission medications    Medication Sig Last Dose Taking? Auth Provider   acetaminophen (TYLENOL) 325 MG tablet Take 325-650 mg by mouth every 6 hours as needed for mild pain Past Week at Unknown time Yes Unknown, Entered By History   levETIRAcetam (KEPPRA) 500 MG tablet Take 1,000 mg by mouth 2 times daily  5/2/2020 at Unknown time Yes Reported, Patient   ondansetron (ZOFRAN ODT) 4 MG ODT tab Take 1 tablet (4 mg) by mouth every 8 hours as needed for nausea Past Week at Unknown time Yes Trierweiler, Chad A, MD

## 2020-05-03 NOTE — ED NOTES
DATE:  5/3/2020   TIME OF RECEIPT FROM LAB: 0454  LAB TEST:  Lactic acid  LAB VALUE:  4  RESULTS GIVEN WITH READ-BACK TO (PROVIDER):  Dr Huynh  TIME LAB VALUE REPORTED TO PROVIDER:   0455

## 2020-05-03 NOTE — CONSULTS
[facility]    Cardiology Consultation     Date of Admission:  5/3/2020    Assessment & Plan   Darya Hamilton is a 24 year old female who was admitted on 5/3/2020. I was asked to see the patient for tachycardia.    We we consulted for wide complex tachycardia. On review this most likely represents aberrancy rather than VT given the narrow complexes at the end of the strip. It is interesting that she is now with rates in the 160s with narrow complex, though the severe electrolyte and pH abnormalties could explain the aberrancy in the 130s before. She is now in what appears to be significant sinus tachycardia to 160s, adenosine was given and although there was significant drift and movement when the HR decreased, it does appear to be sinus tachycardia. Therefore it is thought that this is all being driven by the primary process with possible withdrawl, severe acidosis, and hypoxia.     #Sinus Tachycardia  #Wide Complex tachycardia - SVT vs. Sinus with aberrancy, unlikely VT  #Hematemesis  #Ketoacidosis, lactic acidosis  #Hyperkalemia  - Continue to normalize pH and electrolyte abnormalities   - Obtain echocardiogram  - Monitor on telemetry, most consistent with worsening sinus tachycardia.    - Continue to evaluate causes of hypoxia and tachycardia per primary team which is the  of tachycardia, Defer to ICU.     Patient seen by and staffed with Dr. Negro.     Gurdeep Cool MD    Code Status    Full Code    Primary Care Physician   Physician No Ref-Primary    Chief Complaint   Tachycardia     History obtained from chart and primary team.     History of Present Illness   Darya Hamilton is a 24 year old female who is unable to give history at this time. The room was not entered due to COVID rule out, though myself and Dr. Negro were present for initial tachycardia and adenosine management.     The patient presents with multiple days of nausea and vomiting, and today had concerns  for hematemesis at home.  Per reports the patient was in her normal state of health until 5 days ago where she had several shots of liquor and beer at a party and then began to feel nauseous.  The day after this she developed nausea and vomiting mostly when eating.  She also notes that she started to have tachycardia at that time.  She continued to feel poorly and then yesterday had a large amount of bright red blood in her emesis.    In the emergency department, she was found to be tachycardic in the 130s and reportedly had SVT to the 180s.  There is also concerns for a wide-complex tachycardia in the 150s, this was captured by ECG.  However wide-complex tachycardia occurred during COVID swab when the patient had a coughing episode.     In the ED she had a potassium of 6.1, lactic acidosis at 7.1, pH of 7.15 with bicarb of 8.  AST was minorly elevated at 68, glucose was 356 without a history of diabetes.  White count was 13.1 with a hemoglobin of 16.8 and platelets of 338.    The patient remains a Copen rule out, she is tachycardic in the 160s currently.  When she was more in the 140s it was obviously sinus tachycardia, its likely this is still sinus tachycardia with the P waves buried in the T wave.  Adenosine was given, there was a lot of movement but it appears most consistent with sinus tachycardia.  Her oxygen status is worsening, she will likely require BiPAP.    Past Medical History   I have reviewed this patient's medical history and updated it with pertinent information if needed.   Past Medical History:   Diagnosis Date     Hodgkin's lymphoma (H)        Past Surgical History   I have reviewed this patient's surgical history and updated it with pertinent information if needed.  Past Surgical History:   Procedure Laterality Date     CHOLECYSTECTOMY         Prior to Admission Medications   Prior to Admission Medications   Prescriptions Last Dose Informant Patient Reported? Taking?   acetaminophen (TYLENOL)  325 MG tablet Past Week at Unknown time Self Yes Yes   Sig: Take 325-650 mg by mouth every 6 hours as needed for mild pain   levETIRAcetam (KEPPRA) 500 MG tablet 5/2/2020 at Unknown time Self Yes Yes   Sig: Take 1,000 mg by mouth 2 times daily    ondansetron (ZOFRAN ODT) 4 MG ODT tab Past Week at Unknown time Self No Yes   Sig: Take 1 tablet (4 mg) by mouth every 8 hours as needed for nausea      Facility-Administered Medications: None     Allergies   Allergies   Allergen Reactions     Asa [Aspirin] Angioedema and Hives     Hydrocodone      Hallucinations       Social History   I have reviewed this patient's social history and updated it with pertinent information if needed. Darya Hamilton  reports that she has never smoked. She has never used smokeless tobacco. She reports current alcohol use. She reports that she does not use drugs.    Family History   I have reviewed this patient's family history and updated it with pertinent information if needed.   No family history on file.    Review of Systems   ROS not completed.     Physical Exam   Temp: 99.2  F (37.3  C) Temp src: Oral BP: 122/82 Pulse: 154 Heart Rate: 154 Resp: (!) 40 SpO2: 97 % O2 Device: Nasal cannula Oxygen Delivery: 3 LPM  Vital Signs with Ranges  Temp:  [98.1  F (36.7  C)-99.2  F (37.3  C)] 99.2  F (37.3  C)  Pulse:  [123-170] 154  Heart Rate:  [124-168] 154  Resp:  [18-43] 40  BP: (103-137)/() 122/82  SpO2:  [85 %-100 %] 97 %  122 lbs 9.21 oz    Constitutional: minimally alert, appears fatigued, in distress.   Remainder per primary exam due to COVID-19 Rule out.     Data   Results for orders placed or performed during the hospital encounter of 05/03/20 (from the past 24 hour(s))   EKG 12 lead   Result Value Ref Range    Interpretation ECG Click View Image link to view waveform and result    Lactic acid whole blood   Result Value Ref Range    Lactic Acid 7.1 (HH) 0.7 - 2.0 mmol/L   CBC with platelets differential   Result Value Ref Range     WBC 13.1 (H) 4.0 - 11.0 10e9/L    RBC Count 4.91 3.8 - 5.2 10e12/L    Hemoglobin 16.8 (H) 11.7 - 15.7 g/dL    Hematocrit 48.7 (H) 35.0 - 47.0 %    MCV 99 78 - 100 fl    MCH 34.2 (H) 26.5 - 33.0 pg    MCHC 34.5 31.5 - 36.5 g/dL    RDW 12.0 10.0 - 15.0 %    Platelet Count 338 150 - 450 10e9/L    Diff Method Automated Method     % Neutrophils 93.4 %    % Lymphocytes 4.2 %    % Monocytes 2.1 %    % Eosinophils 0.0 %    % Basophils 0.1 %    % Immature Granulocytes 0.2 %    Nucleated RBCs 0 0 /100    Absolute Neutrophil 12.3 (H) 1.6 - 8.3 10e9/L    Absolute Lymphocytes 0.6 (L) 0.8 - 5.3 10e9/L    Absolute Monocytes 0.3 0.0 - 1.3 10e9/L    Absolute Eosinophils 0.0 0.0 - 0.7 10e9/L    Absolute Basophils 0.0 0.0 - 0.2 10e9/L    Abs Immature Granulocytes 0.0 0 - 0.4 10e9/L    Absolute Nucleated RBC 0.0    Comprehensive metabolic panel   Result Value Ref Range    Sodium 127 (L) 133 - 144 mmol/L    Potassium 6.1 (HH) 3.4 - 5.3 mmol/L    Chloride 93 (L) 94 - 109 mmol/L    Carbon Dioxide 8 (LL) 20 - 32 mmol/L    Anion Gap 26 (H) 3 - 14 mmol/L    Glucose 356 (H) 70 - 99 mg/dL    Urea Nitrogen 17 7 - 30 mg/dL    Creatinine 0.96 0.52 - 1.04 mg/dL    GFR Estimate 81 >60 mL/min/[1.73_m2]    GFR Estimate If Black >90 >60 mL/min/[1.73_m2]    Calcium 8.4 (L) 8.5 - 10.1 mg/dL    Bilirubin Total 1.0 0.2 - 1.3 mg/dL    Albumin 5.1 (H) 3.4 - 5.0 g/dL    Protein Total 10.2 (H) 6.8 - 8.8 g/dL    Alkaline Phosphatase 85 40 - 150 U/L    ALT 46 0 - 50 U/L    AST 68 (H) 0 - 45 U/L   HCG qualitative Blood   Result Value Ref Range    HCG Qualitative Serum Negative NEG^Negative   Lipase   Result Value Ref Range    Lipase 141 73 - 393 U/L   ABO/Rh type and screen   Result Value Ref Range    ABO B     RH(D) Pos     Antibody Screen Neg     Test Valid Only At [facility]        Specimen Expires 05/06/2020    Alcohol ethyl   Result Value Ref Range    Ethanol g/dL <0.01 <0.01 g/dL   INR   Result Value Ref Range    INR 1.26 (H) 0.86 -  1.14   Magnesium   Result Value Ref Range    Magnesium 1.7 1.6 - 2.3 mg/dL   Phosphorus   Result Value Ref Range    Phosphorus 4.8 (H) 2.5 - 4.5 mg/dL   Partial thromboplastin time   Result Value Ref Range    PTT 23 22 - 37 sec   Troponin I   Result Value Ref Range    Troponin I ES <0.015 0.000 - 0.045 ug/L   TSH with free T4 reflex   Result Value Ref Range    TSH 4.31 (H) 0.40 - 4.00 mU/L   Hemoglobin A1c   Result Value Ref Range    Hemoglobin A1C 4.6 0 - 5.6 %   T4 free   Result Value Ref Range    T4 Free 0.86 0.76 - 1.46 ng/dL   CK total   Result Value Ref Range    CK Total 191 30 - 225 U/L   EKG 12 lead   Result Value Ref Range    Interpretation ECG Click View Image link to view waveform and result    COVID-19 Virus (Coronavirus) by PCR Nasopharyngeal    Specimen: Nasopharyngeal   Result Value Ref Range    COVID-19 Virus PCR to [facility] - Source Nasopharyngeal     COVID-19 Virus PCR to [facility] - Result       Test received-See reflex to IDDL test SARS CoV2 (COVID-19) Virus RT-PCR   Blood gas venous and oxyhgb   Result Value Ref Range    Ph Venous 7.15 (LL) 7.32 - 7.43 pH    PCO2 Venous 26 (L) 40 - 50 mm Hg    PO2 Venous 39 25 - 47 mm Hg    Bicarbonate Venous 9 (LL) 21 - 28 mmol/L    FIO2 room air     Oxyhemoglobin Venous 68 %    Base Deficit Venous 18.1 mmol/L   Ketone Beta-Hydroxybutyrate Quantitative   Result Value Ref Range    Ketone Quantitative 6.1 (HH) 0.0 - 0.6 mmol/L   Glucose by meter   Result Value Ref Range    Glucose 138 (H) 70 - 99 mg/dL   CT Chest/Abdomen/Pelvis w Contrast    Narrative    EXAM: CT CHEST/ABDOMEN/PELVIS W CONTRAST  LOCATION: [facility]  DATE/TIME: 5/3/2020 4:01 AM    INDICATION: Chest and abdominal pain. Vomiting.  COMPARISON: None.  TECHNIQUE: CT scan of the chest, abdomen, and pelvis was performed before and after injection of IV contrast. Multiplanar reformats were obtained. Dose reduction techniques were used.  CONTRAST: 65mL [id number]-[id number]    FINDINGS:   LUNGS AND  PLEURA: No infiltrate or pleural effusion.    MEDIASTINUM/AXILLAE: No adenopathy or pericardial effusion. Distal esophageal wall thickening not excluded.    HEPATOBILIARY: Hepatic steatosis. Cholecystectomy.    PANCREAS: Normal.    SPLEEN: Normal.    ADRENAL GLANDS: Normal.    KIDNEYS/BLADDER: Normal.    BOWEL: Normal caliber. Underdistention of colon reduces evaluation for wall thickening/colitis.    LYMPH NODES: Normal.    VASCULATURE: Unremarkable.    PELVIC ORGANS: Trace amount of free fluid.    MUSCULOSKELETAL: Within normal limits.      Impression    IMPRESSION:  1.  No infiltrate or pleural effusion.  2.  No bowel obstruction or abscess.  3.  Distal esophageal wall thickening not excluded. Further characterization is indicated consider follow-up esophagram.  4.  Underdistention of colon reduces evaluation for colitis.   Lactic acid   Result Value Ref Range    Lactic Acid 4.0 (HH) 0.7 - 2.0 mmol/L   Basic metabolic panel   Result Value Ref Range    Sodium 132 (L) 133 - 144 mmol/L    Potassium 4.5 3.4 - 5.3 mmol/L    Chloride 101 94 - 109 mmol/L    Carbon Dioxide 13 (L) 20 - 32 mmol/L    Anion Gap 18 (H) 3 - 14 mmol/L    Glucose 140 (H) 70 - 99 mg/dL    Urea Nitrogen 13 7 - 30 mg/dL    Creatinine 0.73 0.52 - 1.04 mg/dL    GFR Estimate >90 >60 mL/min/[1.73_m2]    GFR Estimate If Black >90 >60 mL/min/[1.73_m2]    Calcium 8.8 8.5 - 10.1 mg/dL   Glucose by meter   Result Value Ref Range    Glucose 181 (H) 70 - 99 mg/dL   Blood gas venous with oxyhemoglobin   Result Value Ref Range    Ph Venous 7.29 (L) 7.32 - 7.43 pH    PCO2 Venous 31 (L) 40 - 50 mm Hg    PO2 Venous 36 25 - 47 mm Hg    Bicarbonate Venous 15 (L) 21 - 28 mmol/L    FIO2 ROOM AIR     Oxyhemoglobin Venous 69 %    Base Deficit Venous 10.4 mmol/L   Glucose by meter   Result Value Ref Range    Glucose 194 (H) 70 - 99 mg/dL   Glucose by meter   Result Value Ref Range    Glucose 201 (H) 70 - 99 mg/dL   Blood culture    Specimen: Blood    Left Hand   Result  Value Ref Range    Specimen Description Blood Left Hand     Special Requests Received in aerobic bottle only     Culture Micro No growth after 2 hours    Lactic acid whole blood   Result Value Ref Range    Lactic Acid 3.5 (H) 0.7 - 2.0 mmol/L   Troponin I   Result Value Ref Range    Troponin I ES 0.076 (H) 0.000 - 0.045 ug/L   Blood culture    Specimen: Blood    Left Hand   Result Value Ref Range    Specimen Description Blood Left Hand     Special Requests Received in aerobic bottle only     Culture Micro No growth after 2 hours    Glucose by meter   Result Value Ref Range    Glucose 170 (H) 70 - 99 mg/dL   Glucose by meter   Result Value Ref Range    Glucose 148 (H) 70 - 99 mg/dL   Glucose by meter   Result Value Ref Range    Glucose 176 (H) 70 - 99 mg/dL   Glucose by meter   Result Value Ref Range    Glucose 157 (H) 70 - 99 mg/dL   EKG 12-lead, tracing only   Result Value Ref Range    Interpretation ECG Click View Image link to view waveform and result    Basic metabolic panel   Result Value Ref Range    Sodium 134 133 - 144 mmol/L    Potassium 3.7 3.4 - 5.3 mmol/L    Chloride 103 94 - 109 mmol/L    Carbon Dioxide 19 (L) 20 - 32 mmol/L    Anion Gap 12 3 - 14 mmol/L    Glucose 179 (H) 70 - 99 mg/dL    Urea Nitrogen 7 7 - 30 mg/dL    Creatinine 0.66 0.52 - 1.04 mg/dL    GFR Estimate >90 >60 mL/min/[1.73_m2]    GFR Estimate If Black >90 >60 mL/min/[1.73_m2]    Calcium 8.4 (L) 8.5 - 10.1 mg/dL   Hemoglobin   Result Value Ref Range    Hemoglobin 13.1 11.7 - 15.7 g/dL   EKG 12-lead, tracing only   Result Value Ref Range    Interpretation ECG Click View Image link to view waveform and result

## 2020-05-03 NOTE — H&P
[facility]    History and Physical - Hospitalist Service       Date of Admission:  5/3/2020    Assessment & Plan   Darya Hamilton is a 24 year old female admitted on 5/3/2020. She presents the emergency department for evaluation of ongoing emesis, report of hematemesis at home    Nausea and vomiting: Unclear primary etiology by history, though some concern for delirium tremens.  Patient reports no prior history of alcohol withdrawal, though note prior hepatic enzyme elevations, currently with 2-1 AST to ALT ratio and psychomotor agitation, reports hallucinosis at home with seeing letters on the ceiling.  -CIWA protocol with gabapentin, Ativan.  -If patient's blood pressures remain sustained, can add clonidine per ICU CIWA protocol  -Patient may require Precedex infusion if clinical suspicion continues to be a concern for alcohol withdrawal with delirium tremens.  -IV Ativan available for nausea and vomiting  -Additional 1 L lactated Ringer bolus to be administered in the ER now that potassium has normalized.  Continue D5 half-normal saline at 200/h  -Intake and output, daily weights  -Repeat CMP in a.m.  -IV Protonix daily  -X-ray esophagram when patient's nausea and vomiting has improved/resolved and COVID ruled out based on possible distal esophageal wall thickening on CT.  -HIV pending given lipodystrophy, encephalopathy.    Hematemesis:  By history, most consistent with Kayla-Edwards tear.  Patient with bilious emesis in the emergency department without evidence of blood.  -Consented for blood products by myself in the emergency department  -Repeat hemoglobin at noon and tomorrow a.m.  -Currently with pneumatic compression devices; monitor for evidence of hematemesis    Tachycardia: Suspect primarily secondary to hypovolemia, though possible also confusion from alcohol withdrawal.  Supraventricular tachycardia, symptomatic ventricular tachycardia: Patient with self-limited episodes of SVT  as well as 1 minute episode of ventricular tachycardia in the emergency department.  Became increasingly lightheaded with ventricular tachycardia despite reduction in rate from 180 range to 150 range.  Suspect dysrhythmias associated with significant acidosis.  -Cardiac telemetry  -TTE  -Cardiology consulted given symptomatic ventricular tachycardia  -Urine drug screen pending; patient's clinical presentation is somewhat concerning for stimulant intoxication or alcohol withdrawal  -Potassium, magnesium, phosphorus replacement protocols in place.    Lactic acidosis, ketoacidosis: Suspect ketoacidosis secondary to starvation ketoacidosis and lactic acidosis related to hypovolemia.  -Repeat lactic acid now  -Blood cultures x2 pending  -Urinalysis with reflex urine culture    Hyperkalemia: Suspect secondary to profound acidosis.  Has normalized with correction of acidosis and initiation of insulin therapy   -Continue insulin drip with D5 half-normal saline at 200/h with for correction of acidosis as above  -Every 6 hours BMP x3 ordered    Seizure disorder: Patient with a history of seizure disorder on Keppra 500 mg twice daily.  No event with seizure-like activity described prior to admission.  -Keppra converted to IV given ongoing nausea and vomiting    Moderate to severe protein calorie malnutrition: Difficult to assess degree of malnutrition given patient reports no oral intake for the past week.  Note that when she moved from [place] to [place] in 2017 and was hospitalized for cholecystectomy for possible symptomatic cholelithiasis at [facility], she reported a 20 pound weight loss at that time and weighed 134 pounds.  Weight currently documented as 130 pounds.  -Daily weights  -N.p.o. currently; following diet advancement, consider nutrition consultation.    History of empyema versus hemothorax?  Patient with scars on her left flank consistent with chest tube placement and possible VATS.  Reports that  she was hospitalized at [facility] in [place] for 1 month, required blood transfusion during that hospitalization though cannot provide further details.  When asked about the year that this occurred she initially tells me it was between the years 2020 and 2022.  When reminded that it is currently the year 2020, she tells me that it was in [id number].  This portion of her history is where her encephalopathy is most notable.  By review of outside records, it appears that this might have been in 2016 prior to her moving to [place] in 2017.  -Request records from [facility] later this morning regarding past medical history given patient's questionable encephalopathy and ability to provide a history.    History of Hodgkin's lymphoma status post chemotherapy          Diet: N.p.o. other than meds and  DVT Prophylaxis: Pneumatic compression devices.  If no evidence of hematemesis, can transition to chemoprophylaxis  Vasques Catheter: not present  Code Status: Full code.  Confirmed with patient on admission    Disposition Plan   Expected discharge: 4 - 7 days, recommended to prior living arrangement once mental status at baseline, safe disposition plan/ TCU bed available and Tachycardia, metabolic derangements rectified.  Entered: Willem Baez MD 05/03/2020, 5:32 AM     The patient's care was discussed with the Patient and Dr Huynh in the emergency department, discussed with ICU staff as well.    Willem Baez MD  [facility]    ______________________________________________________________________    Chief Complaint   Nausea and vomiting    History is obtained from the patient, discussion with Dr. Huynh in the emergency department, review of outside records including prior hospitalization at [facility] for nausea and vomiting Upon moving to [place] in 2017 for which a cholecystectomy was performed given possibility of symptomatic cholelithiasis.    History of Present  "Illness   Darya Hamilton is a 24 year old female who presents to the emergency department for evaluation of 5 days nausea and vomiting, now with episode of hematemesis at home resulting in presentation today.    Patient reports that 5 days ago she was in her usual state of health.  Had a party with friends where  they shared \"several shots\" of liquor and she may have had a beer.  Following this event, patient began to feel a little bit queasy.  4 days prior to admission developed nausea and vomiting, largely with oral intake.  Patient reports that she began to notice tachycardia around this time as well.  Patient continued to have nausea and vomiting for the subsequent days preceding her admission.  5/2/2020 had a large amount of emesis resulting in bright red blood in her emesis.  This, along with muscular pain associated with retching is what brought her to the emergency department for evaluation.    In the emergency department, patient with tachycardia in the 130s range.  She did have some SVT into the 180s range which was self-limited as well as an episode of ventricular tachycardia with heart rates in the 150s range lasting approximately 1 minute and during which patient experiencing increased lightheadedness.  This was captured on EKG.  Wide-complex tachycardia occurred after COVID swab was performed by nursing staff and patient had coughing episode.    Patient with an episode of emesis in the emergency department which was bilious in appearance, no blood noted.    Laboratory studies with significant abnormalities including potassium of 6.1, lactic acid of 7.1, venous pH of 7.15, bicarb of 8.  AST elevated at 68, ALT within normal limits at 46.  Bilirubin within normal limits.  Patient with initial glucose of 356, though no history of diabetes.  Hemoglobin A1c was obtained which was normal in the 4 range.  Hemoconcentration with white count of 13.1, hemoglobin of 16.8.  Platelets 338.    Patient is able " to provide a history, though there are some subtle findings regarding her mental status.  Patient is a bit tangential in providing her history, and when asked about more distant events such as her prior suspected empyema where she describes being hospitalized for a pneumonia resulting in chest tubes and a scar consistent with VATS, patient tells me that this occurred between the years 2020 and 2022.  She is able to tell me that it is the year 2020, and when pressed on her incorrect dates, tells me that her surgery was between 2019 and 2020.  When reminded that 2019 was in the past several months, she corrects herself again to tell me that this procedure took place in 2020.  Patient moved from [place], where this procedure took place to [place] in 2017.    Patient has no significant tremulousness, though shakiness of her voice.  Reports a sore throat, worse with swallowing which may or may not be related to nausea and emesis.  Reports feeling feverish, though having no measured fever as well as intermittent chills.  She tells me that she has had decreased urine output as she has not been tolerating liquid intake, in the past 5 days believes she is only urinated twice, though has had multiple episodes of urination in the emergency department following IV volume resuscitation.    Patient has a history of seizure disorder on Keppra.  She tells me that she has not missed doses of her Keppra despite nausea and vomiting.  Note that in March 2020 she had an undetectable Keppra level.  Patient has not had any witnessed seizure-like activity by report, and is not postictal in the emergency department, though as above, some subtle confusion.  She tells me that in the past days she has been hallucinating.  Yesterday she saw letters on her ceiling that she pointed out to her significant other, later in the day thought she saw the shadow of a man in the corner which was not present.   Not currently hallucinating    Review of  Systems    The 10 point Review of Systems is negative other than noted in the HPI or here.   No diarrhea  No measured fevers  Dry cough  No shortness of breath    Past Medical History    I have reviewed this patient's medical history and updated it with pertinent information if needed.   Past Medical History:   Diagnosis Date     Hodgkin's lymphoma (H)    Epilepsy    Past Surgical History   I have reviewed this patient's surgical history and updated it with pertinent information if needed.  Past Surgical History:   Procedure Laterality Date     CHOLECYSTECTOMY     LN bx of chest  Chest tubes for PNA    Social History   I have reviewed this patient's social history and updated it with pertinent information if needed.  Social History     Tobacco Use     Smoking status: Never Smoker     Smokeless tobacco: Never Used   Substance Use Topics     Alcohol use: Yes      Comment: 1-2/week     Drug use:  Denies a history of illicit drug use       Family History   I have reviewed this patient's family history and updated it with pertinent information if needed.   Mother with history of asthma, alcoholism, cirrhosis with hepatic encephalopathy    Prior to Admission Medications   Prior to Admission Medications   Prescriptions Last Dose Informant Patient Reported? Taking?   levETIRAcetam (KEPPRA) 500 MG tablet   Yes No   Sig: Take 500 mg by mouth   ondansetron (ZOFRAN ODT) 4 MG ODT tab   No No   Sig: Take 1 tablet (4 mg) by mouth every 8 hours as needed for nausea      Facility-Administered Medications: None     Allergies   Allergies   Allergen Reactions     Asa [Aspirin]      Hydrocodone        Physical Exam   Vital Signs: Temp: 98.1  F (36.7  C) Temp src: Oral BP: (!) 137/107 Pulse: 133 Heart Rate: 138 Resp: 21 SpO2: 98 % O2 Device: None (Room air)    Weight: 130 lbs 0 oz    General Appearance: Thin, chronically ill-appearing 24-year-old female  Eyes: No scleral icterus or injection  HEENT: Normocephalic without evidence of  trauma.  Almost appears to have mild proptosis, though I suspect this is secondary to subcutaneous fat loss.  Respiratory: Breath sounds are clear bilaterally without Crackles or wheezing.  Good effort.  Occasional dry cough without production  Cardiovascular: Tachycardia to the 120 range.  No appreciable murmur.  GI: Abdomen soft, minimal tenderness to palpation diffusely.  Bowel sounds present.  No palpable mass  Lymph/Hematologic: No lower extremity edema  Skin: No rashes.  Scarring from prior chest tube and likely VATS on left flank  Musculoskeletal: Thin with subcutaneous fat loss.  Muscular tone largely intact in all extremities, though some concern for muscular wasting as well.  Neurologic: Alert, conversant, appropriate in conversation.  Appears to give a meaningful history, though is mildly tangential.  Later, however, has difficulty with dates, and makes repeated mistakes in a similar fashion naming years of possible prior events  Psychiatric: Mildly anxious/agitated, very pleasant    Data   Data reviewed today: I reviewed all medications, new labs and imaging results over the last 24 hours. I personally reviewed the EKG tracing showing initially rapid narrow complex tachycardia, later with a 1 min episode of wide complex tachycardia captured on EKG before terminating.    Recent Labs   Lab 05/03/20  0437 05/03/20  0145   WBC  --  13.1*   HGB  --  16.8*   MCV  --  99   PLT  --  338   INR  --  1.26*   * 127*   POTASSIUM 4.5 6.1*   CHLORIDE 101 93*   CO2 13* 8*   BUN 13 17   CR 0.73 0.96   ANIONGAP 18* 26*   DONOVAN 8.8 8.4*   * 356*   ALBUMIN  --  5.1*   PROTTOTAL  --  10.2*   BILITOTAL  --  1.0   ALKPHOS  --  85   ALT  --  46   AST  --  68*   LIPASE  --  141   TROPI  --  <0.015

## 2020-05-03 NOTE — PROCEDURES
[facility]    Procedure: Cardioversion External    Date/Time: 5/3/2020 2:33 PM  Performed by: Fredis Rodney MD  Authorized by: Fredis Rodney MD     UNIVERSAL PROTOCOL   Site Marked: NA  Prior Images Obtained and Reviewed:  NA  Required items: Required blood products, implants, devices and special equipment available    Patient identity confirmed:  Arm band and verbally with patient  NA - No sedation, light sedation, or local anesthesia  Confirmation Checklist:  Patient's identity using two indicators, correct equipment/implants were available, relevant allergies and procedure was appropriate and matched the consent or emergent situation  Time out: Immediately prior to the procedure a time out was called    Universal Protocol: the Joint Commission Universal Protocol was followed          SEDATION    Patient Sedated: No      PROCEDURE DETAILS  Cardioversion basis: emergent  Pre-procedure rhythm: supraventricular tachycardia  Patient position: patient was placed in a supine position  Chest area: chest area exposed  Electrodes: pads  Electrodes placed: anterior-lateral  Number of attempts: 2    Details of Attempts:  Patient with svt on monitor.  Attempted chemical cardioversion x2 with 6 followed by 12 mg of adenosine.  Lifepack on at all times, ekg rhythm strip running.  Although patient's rate did decrease to ~100 with adenosine, it did not appear to convert to sinus and she immediately returned to a rate of 160.  Post-procedure rhythm: supraventricular tachycardia  Complications: no complications    PROCEDURE   Patient Tolerance:  Patient tolerated the procedure well with no immediate complications    Length of time physician/provider present for 1:1 monitoring during sedation: 0

## 2020-05-03 NOTE — PROGRESS NOTES
Intensivist addendum:  Echo reviewed.  EF 20-25%, stress cmp pattern.  Still tachycardic; remains regular, but no obvious p-waves on repeat ekg.  Remains hemodynamically stable.  Would not attempt further rate control as accelerated rate may be compensatory.  Stopping IV fluids.  Continue prn bipap.  Continue other cares as at present.

## 2020-05-03 NOTE — PROGRESS NOTES
Staff assist called about 1130. Patient had been in SVT but rate now consistently higher - 160s. Patient RR 40s, states SOB, and 3L NC now inadequate to get SPO2 >92, sats mid 80s, no obvious increase in sats with increase in oxygen via NC. Was just on the phone w hospitalist bc of these things, and he said he would come, but patient appearing to decline. Dr cyr, dr franco and multiple RN and RT responded. Cardiologist and hospitalist also came to bedside. Done 12 lead, labs, adenosine. sats improved with bipap. Getting xray now. Unable to obtain hoyt but patient able to void large amount of urine.

## 2020-05-04 ENCOUNTER — APPOINTMENT (OUTPATIENT)
Dept: GENERAL RADIOLOGY | Facility: CLINIC | Age: 25
DRG: 368 | End: 2020-05-04
Attending: INTERNAL MEDICINE

## 2020-05-04 ENCOUNTER — APPOINTMENT (OUTPATIENT)
Dept: OCCUPATIONAL THERAPY | Facility: CLINIC | Age: 25
DRG: 368 | End: 2020-05-04
Attending: INTERNAL MEDICINE

## 2020-05-04 LAB
ALBUMIN SERPL-MCNC: 3.3 G/DL (ref 3.4–5)
ALP SERPL-CCNC: 50 U/L (ref 40–150)
ALT SERPL W P-5'-P-CCNC: 28 U/L (ref 0–50)
ANION GAP SERPL CALCULATED.3IONS-SCNC: 10 MMOL/L (ref 3–14)
ANION GAP SERPL CALCULATED.3IONS-SCNC: 8 MMOL/L (ref 3–14)
AST SERPL W P-5'-P-CCNC: 52 U/L (ref 0–45)
BASE DEFICIT BLDV-SCNC: 6.9 MMOL/L
BASE EXCESS BLDV CALC-SCNC: 1.2 MMOL/L
BILIRUB SERPL-MCNC: 1 MG/DL (ref 0.2–1.3)
BUN SERPL-MCNC: 3 MG/DL (ref 7–30)
BUN SERPL-MCNC: 4 MG/DL (ref 7–30)
CA-I BLD-MCNC: 4 MG/DL (ref 4.4–5.2)
CA-I BLD-MCNC: 4 MG/DL (ref 4.4–5.2)
CA-I BLD-MCNC: NORMAL MG/DL (ref 4.4–5.2)
CALCIUM SERPL-MCNC: 7.8 MG/DL (ref 8.5–10.1)
CALCIUM SERPL-MCNC: 8 MG/DL (ref 8.5–10.1)
CHLORIDE SERPL-SCNC: 102 MMOL/L (ref 94–109)
CHLORIDE SERPL-SCNC: 99 MMOL/L (ref 94–109)
CO2 SERPL-SCNC: 23 MMOL/L (ref 20–32)
CO2 SERPL-SCNC: 24 MMOL/L (ref 20–32)
CREAT SERPL-MCNC: 0.56 MG/DL (ref 0.52–1.04)
CREAT SERPL-MCNC: 0.62 MG/DL (ref 0.52–1.04)
ERYTHROCYTE [DISTWIDTH] IN BLOOD BY AUTOMATED COUNT: 12.2 % (ref 10–15)
ERYTHROCYTE [DISTWIDTH] IN BLOOD BY AUTOMATED COUNT: 12.2 % (ref 10–15)
GFR SERPL CREATININE-BSD FRML MDRD: >90 ML/MIN/{1.73_M2}
GFR SERPL CREATININE-BSD FRML MDRD: >90 ML/MIN/{1.73_M2}
GLUCOSE SERPL-MCNC: 125 MG/DL (ref 70–99)
GLUCOSE SERPL-MCNC: 138 MG/DL (ref 70–99)
HCO3 BLDV-SCNC: 20 MMOL/L (ref 21–28)
HCO3 BLDV-SCNC: 24 MMOL/L (ref 21–28)
HCT VFR BLD AUTO: 38 % (ref 35–47)
HCT VFR BLD AUTO: 41.8 % (ref 35–47)
HGB BLD-MCNC: 13.3 G/DL (ref 11.7–15.7)
HGB BLD-MCNC: 14.9 G/DL (ref 11.7–15.7)
HIV 1+2 AB+HIV1 P24 AG SERPL QL IA: NONREACTIVE
LACTATE BLD-SCNC: 1.6 MMOL/L (ref 0.7–2)
LACTATE BLD-SCNC: 2.3 MMOL/L (ref 0.7–2)
LEVETIRACETAM SERPL-MCNC: <2 UG/ML (ref 12–46)
MAGNESIUM SERPL-MCNC: 2.2 MG/DL (ref 1.6–2.3)
MAGNESIUM SERPL-MCNC: 2.3 MG/DL (ref 1.6–2.3)
MCH RBC QN AUTO: 33.3 PG (ref 26.5–33)
MCH RBC QN AUTO: 33.6 PG (ref 26.5–33)
MCHC RBC AUTO-ENTMCNC: 35 G/DL (ref 31.5–36.5)
MCHC RBC AUTO-ENTMCNC: 35.6 G/DL (ref 31.5–36.5)
MCV RBC AUTO: 94 FL (ref 78–100)
MCV RBC AUTO: 95 FL (ref 78–100)
OXYHGB MFR BLDV: 67 %
OXYHGB MFR BLDV: 73 %
PCO2 BLDV: 34 MM HG (ref 40–50)
PCO2 BLDV: 42 MM HG (ref 40–50)
PH BLDV: 7.28 PH (ref 7.32–7.43)
PH BLDV: 7.47 PH (ref 7.32–7.43)
PHOSPHATE SERPL-MCNC: 1 MG/DL (ref 2.5–4.5)
PHOSPHATE SERPL-MCNC: 2.1 MG/DL (ref 2.5–4.5)
PLATELET # BLD AUTO: 186 10E9/L (ref 150–450)
PLATELET # BLD AUTO: 205 10E9/L (ref 150–450)
PO2 BLDV: 33 MM HG (ref 25–47)
PO2 BLDV: 43 MM HG (ref 25–47)
POTASSIUM SERPL-SCNC: 3.2 MMOL/L (ref 3.4–5.3)
POTASSIUM SERPL-SCNC: 3.8 MMOL/L (ref 3.4–5.3)
PROT SERPL-MCNC: 6.8 G/DL (ref 6.8–8.8)
RBC # BLD AUTO: 4 10E12/L (ref 3.8–5.2)
RBC # BLD AUTO: 4.44 10E12/L (ref 3.8–5.2)
SODIUM SERPL-SCNC: 132 MMOL/L (ref 133–144)
SODIUM SERPL-SCNC: 134 MMOL/L (ref 133–144)
TROPONIN I SERPL-MCNC: 0.45 UG/L (ref 0–0.04)
WBC # BLD AUTO: 10.4 10E9/L (ref 4–11)
WBC # BLD AUTO: 12.2 10E9/L (ref 4–11)

## 2020-05-04 PROCEDURE — 82805 BLOOD GASES W/O2 SATURATION: CPT | Performed by: INTERNAL MEDICINE

## 2020-05-04 PROCEDURE — 83605 ASSAY OF LACTIC ACID: CPT | Performed by: INTERNAL MEDICINE

## 2020-05-04 PROCEDURE — 25000125 ZZHC RX 250: Performed by: INTERNAL MEDICINE

## 2020-05-04 PROCEDURE — 99291 CRITICAL CARE FIRST HOUR: CPT | Performed by: INTERNAL MEDICINE

## 2020-05-04 PROCEDURE — 84484 ASSAY OF TROPONIN QUANT: CPT | Performed by: INTERNAL MEDICINE

## 2020-05-04 PROCEDURE — 84100 ASSAY OF PHOSPHORUS: CPT | Performed by: INTERNAL MEDICINE

## 2020-05-04 PROCEDURE — C9113 INJ PANTOPRAZOLE SODIUM, VIA: HCPCS | Performed by: STUDENT IN AN ORGANIZED HEALTH CARE EDUCATION/TRAINING PROGRAM

## 2020-05-04 PROCEDURE — 21000001 ZZH R&B HEART CARE

## 2020-05-04 PROCEDURE — 71045 X-RAY EXAM CHEST 1 VIEW: CPT

## 2020-05-04 PROCEDURE — 25800030 ZZH RX IP 258 OP 636: Performed by: INTERNAL MEDICINE

## 2020-05-04 PROCEDURE — 25000125 ZZHC RX 250: Performed by: STUDENT IN AN ORGANIZED HEALTH CARE EDUCATION/TRAINING PROGRAM

## 2020-05-04 PROCEDURE — 80048 BASIC METABOLIC PNL TOTAL CA: CPT | Performed by: INTERNAL MEDICINE

## 2020-05-04 PROCEDURE — 25000128 H RX IP 250 OP 636: Performed by: STUDENT IN AN ORGANIZED HEALTH CARE EDUCATION/TRAINING PROGRAM

## 2020-05-04 PROCEDURE — 80053 COMPREHEN METABOLIC PANEL: CPT | Performed by: INTERNAL MEDICINE

## 2020-05-04 PROCEDURE — 99232 SBSQ HOSP IP/OBS MODERATE 35: CPT | Performed by: INTERNAL MEDICINE

## 2020-05-04 PROCEDURE — 82330 ASSAY OF CALCIUM: CPT | Performed by: INTERNAL MEDICINE

## 2020-05-04 PROCEDURE — 25000128 H RX IP 250 OP 636: Performed by: INTERNAL MEDICINE

## 2020-05-04 PROCEDURE — C9113 INJ PANTOPRAZOLE SODIUM, VIA: HCPCS | Performed by: INTERNAL MEDICINE

## 2020-05-04 PROCEDURE — 97535 SELF CARE MNGMENT TRAINING: CPT | Mod: GO | Performed by: OCCUPATIONAL THERAPIST

## 2020-05-04 PROCEDURE — 25000132 ZZH RX MED GY IP 250 OP 250 PS 637: Performed by: INTERNAL MEDICINE

## 2020-05-04 PROCEDURE — 99221 1ST HOSP IP/OBS SF/LOW 40: CPT | Performed by: INTERNAL MEDICINE

## 2020-05-04 PROCEDURE — 99232 SBSQ HOSP IP/OBS MODERATE 35: CPT | Performed by: STUDENT IN AN ORGANIZED HEALTH CARE EDUCATION/TRAINING PROGRAM

## 2020-05-04 PROCEDURE — 25000132 ZZH RX MED GY IP 250 OP 250 PS 637: Performed by: STUDENT IN AN ORGANIZED HEALTH CARE EDUCATION/TRAINING PROGRAM

## 2020-05-04 PROCEDURE — 85027 COMPLETE CBC AUTOMATED: CPT | Performed by: INTERNAL MEDICINE

## 2020-05-04 PROCEDURE — 40000275 ZZH STATISTIC RCP TIME EA 10 MIN

## 2020-05-04 PROCEDURE — 83735 ASSAY OF MAGNESIUM: CPT | Performed by: INTERNAL MEDICINE

## 2020-05-04 PROCEDURE — 40000239 ZZH STATISTIC VAT ROUNDS

## 2020-05-04 PROCEDURE — 25800030 ZZH RX IP 258 OP 636: Performed by: STUDENT IN AN ORGANIZED HEALTH CARE EDUCATION/TRAINING PROGRAM

## 2020-05-04 PROCEDURE — 97166 OT EVAL MOD COMPLEX 45 MIN: CPT | Mod: GO | Performed by: OCCUPATIONAL THERAPIST

## 2020-05-04 RX ADMIN — PIPERACILLIN AND TAZOBACTAM 3.38 G: 3; .375 INJECTION, POWDER, FOR SOLUTION INTRAVENOUS at 20:20

## 2020-05-04 RX ADMIN — LEVETIRACETAM 1000 MG: 10 INJECTION INTRAVENOUS at 08:21

## 2020-05-04 RX ADMIN — CALCIUM GLUCONATE 2 G: 98 INJECTION, SOLUTION INTRAVENOUS at 13:30

## 2020-05-04 RX ADMIN — POTASSIUM CHLORIDE 20 MEQ: 29.8 INJECTION, SOLUTION INTRAVENOUS at 00:54

## 2020-05-04 RX ADMIN — LEVETIRACETAM 1000 MG: 10 INJECTION INTRAVENOUS at 20:22

## 2020-05-04 RX ADMIN — POTASSIUM CHLORIDE 20 MEQ: 29.8 INJECTION, SOLUTION INTRAVENOUS at 11:36

## 2020-05-04 RX ADMIN — FOLIC ACID 1 MG: 5 INJECTION, SOLUTION INTRAMUSCULAR; INTRAVENOUS; SUBCUTANEOUS at 10:28

## 2020-05-04 RX ADMIN — GABAPENTIN 900 MG: 300 CAPSULE ORAL at 15:14

## 2020-05-04 RX ADMIN — PANTOPRAZOLE SODIUM 40 MG: 40 INJECTION, POWDER, FOR SOLUTION INTRAVENOUS at 09:43

## 2020-05-04 RX ADMIN — THIAMINE HYDROCHLORIDE 200 MG: 100 INJECTION, SOLUTION INTRAMUSCULAR; INTRAVENOUS at 16:17

## 2020-05-04 RX ADMIN — THIAMINE HYDROCHLORIDE 200 MG: 100 INJECTION, SOLUTION INTRAMUSCULAR; INTRAVENOUS at 21:00

## 2020-05-04 RX ADMIN — PIPERACILLIN AND TAZOBACTAM 3.38 G: 3; .375 INJECTION, POWDER, FOR SOLUTION INTRAVENOUS at 09:40

## 2020-05-04 RX ADMIN — PIPERACILLIN AND TAZOBACTAM 3.38 G: 3; .375 INJECTION, POWDER, FOR SOLUTION INTRAVENOUS at 15:15

## 2020-05-04 RX ADMIN — MULTIPLE VITAMINS W/ MINERALS TAB 1 TABLET: TAB at 10:27

## 2020-05-04 RX ADMIN — POTASSIUM CHLORIDE 20 MEQ: 29.8 INJECTION, SOLUTION INTRAVENOUS at 02:45

## 2020-05-04 RX ADMIN — POTASSIUM PHOSPHATE, MONOBASIC AND POTASSIUM PHOSPHATE, DIBASIC 15 MMOL: 224; 236 INJECTION, SOLUTION INTRAVENOUS at 08:24

## 2020-05-04 RX ADMIN — PIPERACILLIN AND TAZOBACTAM 3.38 G: 3; .375 INJECTION, POWDER, FOR SOLUTION INTRAVENOUS at 02:01

## 2020-05-04 RX ADMIN — THIAMINE HYDROCHLORIDE 200 MG: 100 INJECTION, SOLUTION INTRAMUSCULAR; INTRAVENOUS at 10:28

## 2020-05-04 ASSESSMENT — ACTIVITIES OF DAILY LIVING (ADL)
ADLS_ACUITY_SCORE: 12
ADLS_ACUITY_SCORE: 12
ADLS_ACUITY_SCORE: 10
ADLS_ACUITY_SCORE: 13
ADLS_ACUITY_SCORE: 12
ADLS_ACUITY_SCORE: 13
PREVIOUS_RESPONSIBILITIES: MEAL PREP;HOUSEKEEPING;LAUNDRY;SHOPPING;MEDICATION MANAGEMENT;FINANCES;WORK

## 2020-05-04 ASSESSMENT — MIFFLIN-ST. JEOR: SCORE: 1267

## 2020-05-04 NOTE — PROGRESS NOTES
"[facility] Cardiology Progress Note          Assessment and Plan:   1. Severe LV systolic dysfunction (EF 20-25%), likely due to stress-induced cardiomyopathy  2. HFrEF  3. Sinus tachycardia, compensatory   4. Electrolyte derangement   5. Nausea and vomiting  6. Hypoxic respiratory failure, resolving    -- She is clinically improving. Respiratory status has improved significantly improved overnight. Still tachycardic but HRs down to 130. BP is soft this morning    -- Will initiate cardiac meds for HF/LV dysfunction likely tomorrow. We will start Coreg and add ACEI when BP tolerates.     -- Continue support care        Interval History:   Respiratory status improved overnight but still hypoxic. Less tachycardic          Review of Systems:   POSITIVE FOR DYSPNEA       Medications:     No current outpatient medications on file.               Physical Exam:   All vitals have been reviewed  Patient Vitals for the past 24 hrs:   BP Temp Temp src Pulse Heart Rate Resp SpO2 Height Weight   05/04/20 1100 91/66 -- -- 138 137 24 100 % -- --   05/04/20 1045 -- 99.8  F (37.7  C) Oral -- 140 30 100 % -- --   05/04/20 1000 93/61 -- -- 135 137 18 97 % -- --   05/04/20 0900 93/70 -- -- 130 131 19 99 % -- --   05/04/20 0830 -- -- -- -- 131 20 100 % -- --   05/04/20 0800 90/76 99.3  F (37.4  C) Oral 168 163 18 100 % -- --   05/04/20 0700 92/69 -- -- 128 140 24 100 % -- --   05/04/20 0600 95/74 -- -- 129 134 21 100 % -- --   05/04/20 0513 94/69 -- -- 138 141 21 100 % -- --   05/04/20 0500 -- -- -- 134 137 28 97 % 1.6 m (5' 2.99\") 55.8 kg (123 lb 0.3 oz)   05/04/20 0400 94/70 97.5  F (36.4  C) Axillary 152 149 29 100 % -- --   05/04/20 0300 103/68 -- -- 140 142 (!) 31 99 % -- --   05/04/20 0238 104/42 -- -- 147 146 23 99 % -- --   05/04/20 0200 -- -- -- 147 154 (!) 33 96 % -- --   05/04/20 0100 97/67 -- -- 151 149 (!) 32 98 % -- --   05/04/20 0000 93/72 100.2  F (37.9  C) Axillary 154 152 (!) 36 98 % -- --   05/03/20 2300 92/73 " -- -- 156 156 (!) 37 100 % -- --   05/03/20 2212 90/53 -- -- -- 158 (!) 37 (!) 86 % -- --   05/03/20 2200 -- -- -- 186 165 (!) 42 98 % -- --   05/03/20 2100 105/79 -- -- 158 160 (!) 37 96 % -- --   05/03/20 2000 98/83 100.3  F (37.9  C) Axillary 154 158 (!) 41 98 % -- --   05/03/20 1900 102/85 -- -- 163 174 (!) 36 (!) 87 % -- --   05/03/20 1815 -- -- -- -- 151 (!) 36 93 % -- --   05/03/20 1800 -- -- -- -- 149 17 93 % -- --   05/03/20 1745 -- -- -- -- 156 (!) 41 91 % -- --   05/03/20 1730 100/79 -- -- 151 152 (!) 31 92 % -- --   05/03/20 1700 102/76 -- -- 158 154 23 98 % -- --   05/03/20 1630 107/75 -- -- 141 140 (!) 36 96 % -- --   05/03/20 1600 108/79 -- -- 146 144 (!) 34 95 % -- --   05/03/20 1545 117/87 -- -- 149 151 (!) 37 97 % -- --   05/03/20 1530 114/88 -- -- 151 149 (!) 36 97 % -- --   05/03/20 1515 123/80 -- -- 147 149 (!) 35 97 % -- --   05/03/20 1500 (!) 117/92 -- -- 160 160 (!) 32 (!) 87 % -- --   05/03/20 1445 104/79 -- -- 158 158 16 96 % -- --   05/03/20 1430 (!) 119/91 -- -- 170 170 (!) 39 (!) 86 % -- --   05/03/20 1415 (!) 118/90 -- -- 156 154 (!) 39 99 % -- --   05/03/20 1400 (!) 117/94 -- -- 156 154 27 97 % -- --   05/03/20 1345 112/83 -- -- 154 154 (!) 34 96 % -- --   05/03/20 1330 (!) 113/92 -- -- 161 161 22 95 % -- --   05/03/20 1315 112/88 -- -- 174 168 (!) 40 91 % -- --   05/03/20 1300 (!) 109/92 -- -- 170 168 (!) 35 90 % -- --       Intake/Output Summary (Last 24 hours) at 5/4/2020 1255  Last data filed at 5/4/2020 1100  Gross per 24 hour   Intake 2972.96 ml   Output 2575 ml   Net 397.96 ml     Gen: In mild respiratory distress  Neck: JVP mildly elevated  Lung: diminished at the bases  CV: Tachycardic  Ext: trace edema          Data:   All laboratory data reviewed  ROUTINE IP LABS (Last four results)  BMP  Recent Labs   Lab 05/04/20  0535 05/04/20  0000 05/03/20  1810 05/03/20  1213    132* 132* 134   POTASSIUM 3.8 3.2* 3.4 3.7   CHLORIDE 102 99 101 103   DONOVAN 7.8* 8.0* 7.8* 8.4*   CO2  24 23 22 19*   BUN 3* 4* 5* 7   CR 0.62 0.56 0.50* 0.66   * 125* 148* 179*     CBC  Recent Labs   Lab 05/04/20  0506 05/04/20  0000 05/03/20  1810  05/03/20  0145   WBC 12.2* 10.4 3.3*  --  13.1*   RBC 4.00 4.44 4.21  --  4.91   HGB 13.3 14.9 13.9   < > 16.8*   HCT 38.0 41.8 39.7  --  48.7*   MCV 95 94 94  --  99   MCH 33.3* 33.6* 33.0  --  34.2*   MCHC 35.0 35.6 35.0  --  34.5   RDW 12.2 12.2 12.2  --  12.0    205 203  --  338    < > = values in this interval not displayed.     INR  Recent Labs   Lab 05/03/20  0145   INR 1.26*                  Attestation:  I have reviewed today's vital signs, notes, medications, labs and imaging.  Amount of time performed on this hospital visit: 30 minutes.     Irving Martin MD

## 2020-05-04 NOTE — PROGRESS NOTES
Critical Care Progress Note      05/04/2020    Name: Darya Hamilton MRN#: [id number]   Age: 24 year old YOB: 1995     [facility]                 Problem List:     - HFrEF  - Dyselectrolytemia  - Nausea / vomiting  - HAGMA, resolved           Summary/Hospital Course:     Admitted on 5/3/2020 with nausea and vomiting, quite abnormal labs included sodium of 127, anion gap of 26, bicarb of 8.  Transferred to the ICU with tachycardia and respiratory distress needing BiPAP.  Found to have decreased EF (20 to 25%).        Assessment and plan :     Darya Hamilton IS a 24 year old female admitted on 5/3/2020 for nausea vomiting diarrhea, abnormal electrolytes and now with respiratory distress and found to have cardiomyopathy likely stress cardiomyopathy.  I have personally reviewed the daily labs, imaging studies, cultures and discussed the case with referring physician and consulting physicians.     My assessment and plan by system for this patient is as follows:    Neurology/Psychiatry:   1.  Concern for alcohol withdrawal.  Currently on CIWA protocol.  2.  History of seizures on Keppra.  Plan  -Alert and oriented.  Continue CIWA protocol.  -Continue antiseizure medications.    Cardiovascular:   1.Hemodynamics -appears euvolemic.  HFrEF 20 to 25%.  Unclear if she has had prior echoes.  She has a history of lymphoma and unclear what the therapy was for it.  Differential will include drug-induced cardiomyopathy although the pattern on echo more consistent with [place] CMP  2.Rhythm -sinus tachycardia now.  Prior history of broad QRS tachycardia likely aberrant conduction in setting of abnormal electrolytes.  EP has evaluated patient.  3. Ischemia -troponin elevated.  Stress versus others.  We will repeat today.  Plan   -Attempt to get outside records.  - Will need CHF medications.  -PRN diuretics as needed.  -Aggressive replacement of electrolytes.  -Cardiology and EP  following.    Pulmonary/Ventilator Management:   1.  Hypoxic respiratory failure: Aspiration versus others.  Pulmonary edema also possible.  2.  Previous history of pneumonia likely complicated with pleural space infection.  Full details not available.  Plan  -Repeat chest x-ray.  Check procalcitonin.  Empiric antibiotics if febrile.    GI and Nutrition :   1.  Question of GI bleed on admission.  2.  Abnormal AST/ALT: Likely related to alcohol use.  Improving.    3  Plan  -Hemoglobin stable, on Protonix.  -Consider clears.    Renal/Fluids/Electrolytes:   1.  HAG MA on admission with anion gap of 26.  Elevated lactic acid.  Also positive ketones.    2. Electrolyte abnormality: Hypo-mag, hypocalcemia.  3.  Volume status  Plan  -Replace electrolytes as needed.  We have a dose of calcium gluconate now and repeat calcium levels.  We will also need replacement of [place].  - monitor function and electrolytes as needed with replacement per ICU protocols.  - generally avoid nephrotoxic agents such as NSAID, IV contrast unless specifically required  - adjust medications as needed for renal clearance  - follow I/O's as appropriate.    Infectious Disease:   1.  No acute infections.    Plan  -Repeat x-ray to evaluate for infection.  Pro-Kevin.    Endocrine:   1. Stress induced hyperglycemia    Plan  - ICU insulin protocol, goal sugar <180      Hematology/Oncology:   1.  Prior history of lymphoma per patient.  2.  Anemia, no signs, symptoms of active blood loss though concern for GI bleed on admission.  MCV 99 on admit.  Likely related to acute alcohol use.   Plan  -Follow transfuse as needed.    Others  Negative pregnancy test on 5 3    IV/Access:   1. Venous access -PIV  2. Arterial access -none    ICU Prophylaxis:   1. DVT: mechanical  2. VAP: HOB 30 degrees, chlorhexidine rinse  3. Stress Ulcer: PPI  4. Restraints: Not needed  5. Wound care - per unit routine   6. Feeding -n.p.o. for now   7. Family Update: Discussed with  patient  8. Disposition -consider transfer out of the ICU    Key goals for next 24 hours:   1.  Aggressive replacement of electrolytes  2.  Serial troponin  3.  Chest x-ray         Interim History:     Transfer to the ICU with respiratory distress.  Also tachycardic.  Found to have heart failure with reduced ejection fraction.  Stabilized overnight with BiPAP and diuresis.         Key Medications:       calcium gluconate  2 g Intravenous Once     [START ON 5/6/2020] folic acid  1 mg Oral Daily     folic acid  1 mg Intravenous Daily     [START ON 5/10/2020] gabapentin  100 mg Oral Q8H     [START ON 5/8/2020] gabapentin  300 mg Oral Q8H     [START ON 5/6/2020] gabapentin  600 mg Oral Q8H     gabapentin  900 mg Oral Q8H     levETIRAcetam  1,000 mg Intravenous Q12H     multivitamin w/minerals  1 tablet Oral Daily     pantoprazole (PROTONIX) IV  40 mg Intravenous Daily with breakfast     piperacillin-tazobactam  3.375 g Intravenous Q6H     thiamine  200 mg Intravenous TID     [START ON 5/5/2020] thiamine  100 mg Oral TID     [START ON 5/10/2020] thiamine  100 mg Oral Daily       dextrose       dextrose 5% and 0.45% NaCl 10 mL/hr at 05/04/20 0400     insulin (regular) Stopped (05/03/20 1610)     - MEDICATION INSTRUCTIONS -                 Physical Examination:   Temp:  [97.5  F (36.4  C)-100.3  F (37.9  C)] 97.5  F (36.4  C)  Pulse:  [128-186] 130  Heart Rate:  [131-174] 131  Resp:  [16-43] 19  BP: ()/(42-94) 93/70  FiO2 (%):  [50 %-70 %] 60 %  SpO2:  [81 %-100 %] 99 %    Intake/Output Summary (Last 24 hours) at 5/4/2020 1049  Last data filed at 5/4/2020 0800  Gross per 24 hour   Intake 3704.96 ml   Output 2125 ml   Net 1579.96 ml     Wt Readings from Last 4 Encounters:   05/04/20 55.8 kg (123 lb 0.3 oz)   08/07/19 56.7 kg (125 lb)   04/16/19 56.7 kg (125 lb)   06/26/18 59 kg (130 lb)     BP - Mean:  [] 79  FiO2 (%): 60 %  Resp: 19    Recent Labs   Lab 05/03/20  1600 05/03/20  0618 05/03/20  0319   PH 7.45  --    --    PCO2 29*  --   --    PO2 67*  --   --    HCO3 20*  --   --    O2PER  --  ROOM AIR room air       GEN: no acute distress   HEENT: head ncat, sclera anicteric, OP patent, trachea midline   PULM: unlabored synchronous with vent, clear anteriorly    CV/COR: RRR S1S2 no gallop,  No rub, no murmur  ABD: soft nontender, hypoactive bowel sounds, no mass  EXT:  Edema   warm  NEURO: grossly intact  SKIN: no obvious rash  LINES: clean, dry intact         Data:   All data and imaging reviewed     ROUTINE ICU LABS (Last four results)  CMP  Recent Labs   Lab 05/04/20  0535 05/04/20  0000 05/03/20  2142 05/03/20  1810 05/03/20  1213  05/03/20  0145    132*  --  132* 134   < > 127*   POTASSIUM 3.8 3.2*  --  3.4 3.7   < > 6.1*   CHLORIDE 102 99  --  101 103   < > 93*   CO2 24 23  --  22 19*   < > 8*   ANIONGAP 8 10  --  9 12   < > 26*   * 125*  --  148* 179*   < > 356*   BUN 3* 4*  --  5* 7   < > 17   CR 0.62 0.56  --  0.50* 0.66   < > 0.96   GFRESTIMATED >90 >90  --  >90 >90   < > 81   GFRESTBLACK >90 >90  --  >90 >90   < > >90   DONOVAN 7.8* 8.0*  --  7.8* 8.4*   < > 8.4*   MAG 2.2 2.3  --  2.8* 1.1*  --  1.7   PHOS 2.1* 1.0* 1.4*  --  0.8*  --  4.8*   PROTTOTAL 6.8  --   --   --   --   --  10.2*   ALBUMIN 3.3*  --   --   --   --   --  5.1*   BILITOTAL 1.0  --   --   --   --   --  1.0   ALKPHOS 50  --   --   --   --   --  85   AST 52*  --   --   --   --   --  68*   ALT 28  --   --   --   --   --  46    < > = values in this interval not displayed.     CBC  Recent Labs   Lab 05/04/20  0506 05/04/20  0000 05/03/20  1810 05/03/20  1213 05/03/20  0145   WBC 12.2* 10.4 3.3*  --  13.1*   RBC 4.00 4.44 4.21  --  4.91   HGB 13.3 14.9 13.9 13.1 16.8*   HCT 38.0 41.8 39.7  --  48.7*   MCV 95 94 94  --  99   MCH 33.3* 33.6* 33.0  --  34.2*   MCHC 35.0 35.6 35.0  --  34.5   RDW 12.2 12.2 12.2  --  12.0    205 203  --  338     INR  Recent Labs   Lab 05/03/20  0145   INR 1.26*     Arterial Blood Gas  Recent Labs   Lab  20  1600 20  0618 20  0319   PH 7.45  --   --    PCO2 29*  --   --    PO2 67*  --   --    HCO3 20*  --   --    O2PER  --  ROOM AIR room air       All cultures:  Recent Labs   Lab 20  0821 20  0808   CULT No growth after 19 hours No growth after 19 hours     Recent Results (from the past 24 hour(s))   XR Chest Port 1 View    Narrative    CHEST ONE VIEW PORTABLE   5/3/2020 1:43 PM     HISTORY:  Hypoxemia.    COMPARISON: 6/15/2018.      Impression    IMPRESSION: Hazy opacity in both lower lungs is new since the previous  exam, and may be related to infection or edema. No pneumothorax.  Pulmonary vascularity is within normal limits.    JANNIE RODRIGUEZ MD   Echocardiogram Complete    Narrative    [id number]  [id number]  [id number]  [id number]^CHALO^YUNG^CHAVA           [facility]  Echocardiography Laboratory  [address] [place] [address]        Name: JOHN COOLEY  MRN: [id number]  : 1995  Study Date: 2020 02:01 PM  Age: 24 yrs  Gender: Female  Patient Location: [facility]  Reason For Study: Tachycardia  Ordering Physician: YUNG ISABEL  Referring Physician: YUNG ISABEL  Performed By: Willem Rodriges RDCS     BSA: 1.6 m2  Height: 63 in  Weight: 122 lb  HR: 157  BP: 117/94 mmHg  _____________________________________________________________________________  __        Procedure  Complete Portable Echo Adult. Optison (NDC #[id number]) given intravenously.  _____________________________________________________________________________  __        Interpretation Summary     Rhythm appears to be sinus tachycardia during study.  1. Left ventricular systolic function is severely reduced. The visual ejection  fraction is estimated at 20-25%.  2. Severe global hyopkinesis with hyperdyanic function at the bases.  Appearance consistent with stress cardiomyopathy.  3. There is mild (1+) mitral regurgitation.  4. No prior studies available for  comparison.  _____________________________________________________________________________  __        Left Ventricle  The left ventricle is normal in size. There is normal left ventricular wall  thickness. The visual ejection fraction is estimated at 20-25%. Left  ventricular systolic function is severely reduced. Left ventricular diastolic  function is indeterminate. Severe global hyopkinesis with hyperdyanic function  at the bases. Appearance consistent with stress cardiomyopathy.     Right Ventricle  The right ventricle is normal in structure, function and size.     Atria  Normal left atrial size. Right atrial size is normal. There is no color  Doppler evidence of an atrial shunt.     Mitral Valve  The mitral valve is normal in structure and function. There is mild (1+)  mitral regurgitation.        Tricuspid Valve  The tricuspid valve is normal in structure and function. There is trace  tricuspid regurgitation.     Aortic Valve  The aortic valve is normal in structure and function.     Pulmonic Valve  The pulmonic valve is normal in structure and function.     Vessels  Normal size aorta. IVC diameter <2.1 cm collapsing >50% with sniff suggests a  normal RA pressure of 3 mmHg.     Pericardium  There is no pericardial effusion.        Rhythm  The rhythm was sinus tachycardia.  _____________________________________________________________________________  __  MMode/2D Measurements & Calculations  IVSd: 0.69 cm     LVIDd: 5.2 cm  LVIDs: 3.7 cm  LVPWd: 0.65 cm  FS: 28.6 %  LV mass(C)d: 115.0 grams  LV mass(C)dI: 73.3 grams/m2  Ao root diam: 2.8 cm  LA dimension: 2.8 cm  asc Aorta Diam: 2.5 cm  LA/Ao: 0.99  LA Volume (BP): 28.3 ml  LA Volume Index (BP): 18.0 ml/m2  RWT: 0.25           Doppler Measurements & Calculations  PA acc time: 0.08 sec  TR max bakari: 306.2 cm/sec  TR max P.5 mmHg           _____________________________________________________________________________  __           Report approved by: Cindy  Charity Negro 05/03/2020 03:23 PM      Cardioversion External    Narrative    Fredis Rodney MD     5/3/2020  2:36 PM  [facility]    Procedure: Cardioversion External    Date/Time: 5/3/2020 2:33 PM  Performed by: Fredis Rodney MD  Authorized by: Fredis Rodney MD     UNIVERSAL PROTOCOL   Site Marked: NA  Prior Images Obtained and Reviewed:  NA  Required items: Required blood products, implants, devices and special   equipment available    Patient identity confirmed:  Arm band and verbally with patient  NA - No sedation, light sedation, or local anesthesia  Confirmation Checklist:  Patient's identity using two indicators, correct   equipment/implants were available, relevant allergies and procedure was   appropriate and matched the consent or emergent situation  Time out: Immediately prior to the procedure a time out was called    Universal Protocol: the Joint Commission Universal Protocol was followed          SEDATION    Patient Sedated: No      PROCEDURE DETAILS  Cardioversion basis: emergent  Pre-procedure rhythm: supraventricular tachycardia  Patient position: patient was placed in a supine position  Chest area: chest area exposed  Electrodes: pads  Electrodes placed: anterior-lateral  Number of attempts: 2    Details of Attempts:  Patient with svt on monitor.  Attempted chemical   cardioversion x2 with 6 followed by 12 mg of adenosine.  [place] on at   all times, ekg rhythm strip running.  Although patient's rate did decrease   to ~100 with adenosine, it did not appear to convert to sinus and she   immediately returned to a rate of 160.  Post-procedure rhythm: supraventricular tachycardia  Complications: no complications    PROCEDURE   Patient Tolerance:  Patient tolerated the procedure well with no immediate   complications    Length of time physician/provider present for 1:1 monitoring during   sedation: 0         Billing: This patient is critically ill: Yes. Total critical care time  today 35 mins.

## 2020-05-04 NOTE — PLAN OF CARE
"Patient at various rates of sinus tachycardia over night, form 130 to 180 bpm (with activity / positioning on a bed pan). Does not appear to be hemodynamically compromised; asymptomatic. On BiPap through the night, occasionally taking off the mask due to feeling uncomfortable. Venous blood gas results discussed with respiratory therapy; Appropriate changes made. Lungs clear and diminished; Patient with flat affect, refuses bed bath stating \"I just want to sleep.\" Voided 100-200 mL every hour; no BM over night, bowel sounds active. Patient's mother called over night; updated. Stated that patient appeared \"incoherent\" over the phone the day before. Reports recent changes in patient's behavior such as not sleeping at night. Reports family history of tachycardia of unknown origin. Patient denies pain; appears neurologically intact, coherent and withdrawn.   " Routing refill request to provider for review/approval because:    Last OV: 7.12.16, was to f/u in 6 wks    Pt out of medication,  Stopped for BP check today, 132/84  States he is suppose to be taking 50 mg, but I only see 25 mg ordered  Upcoming appt 3.20.17    OK to refill?  Rosalind Keene RN, BS  Clinical Nurse Triage.

## 2020-05-04 NOTE — PLAN OF CARE
Discharge Planner OT   Patient plan for discharge: Home  Current status: OT eval completed in ICU and treatment initiated. Appropriate to see per RN. HRs have been 140's at rest. Using face mask; O2 sats %. OT ordered for cognition; pt was noted to be confused upon admission. Today, Pt was alert, oriented x 4, cooperative and friendly. Able to follow all directions. Able to recall 5/5 words after several minutes. She completed bed mobility (I)ly, donned socks (I)ly while seated, completed commode task with SBA. Will complete more formal cognitive assessment next session when pt is more medically stable.  Barriers to return to prior living situation: medical status  Recommendations for discharge: Home with assist for household chores from significant other  Rationale for recommendations: Pending improvement in cognition and medical status, anticipate that pt will be appropriate to return to previous living setting with help for heavy chores due to limited activity tolerance/tachycardia.       Entered by: Holly Garcia 05/04/2020 9:47 AM

## 2020-05-04 NOTE — PLAN OF CARE
Progress Note    Cares assumed 4021-2627. Patient remains on BiPAP 50% FiO2, RR 30-40s. HR continues sinus tachycardia rates 150s and up to 180s with activity/movement. PRN Ativan given x1 for nausea. One time dose of lasix ordered per Dr. Teague and given with 800 ml UOP so far. Patient A&Ox4, but sleepy.

## 2020-05-04 NOTE — PROGRESS NOTES
[facility]    Medicine Progress Note - Hospitalist Service       Date of Admission:  5/3/2020  Date of Service: 05/04/2020    Assessment & Plan   Darya Hamilton is a 24 year old female admitted on 5/3/2020. She presents the emergency department for evaluation of ongoing emesis, report of hematemesis at home     Sinus Tachycardic  HFrEF   Assessment: Suspect primarily secondary to hypovolemia, though possible also confusion from alcohol withdrawal.  Supraventricular tachycardia, symptomatic ventricular tachycardia: Patient with self-limited episodes of SVT as well as 1 minute episode of ventricular tachycardia in the emergency department.  Became increasingly lightheaded with ventricular tachycardia despite reduction in rate from 180 range to 150 range. Suspect dysrhythmias associated with significant acidosis. ECHO showx Severe LV systolic dysfunction (EF 20-25%), likely due to stress-induced cardiomyopathy  Plan:  - Cardiology following  - Continue Telemetry  - Transfer to [facility]  - initiate cardiac meds for HF/LV dysfunction likely tomorrow. Cardiology planning on starting start Coreg and add ACEI when BP tolerates.     Lactic acidosis, ketoacidosis  Aspiration PNA?  Acute Hypoxic Respiratory Failure  Assessment:  Suspect ketoacidosis secondary to starvation ketoacidosis and lactic acidosis related to hypovolemia. O2 needs now declining, resp status stable and improving. COVID-10 PCR was negative.   Plan:  -Continue IV Zosyn  -Blood cultures x2 pending  -Urinalysis with reflex urine culture    Nausea and vomiting  Assessment: Patient reports no prior history of alcohol withdrawal, though note prior hepatic enzyme elevations, currently with 2-1 AST to ALT ratio and psychomotor agitation, reports hallucinosis at home with seeing letters on the ceiling.  -CIWA protocol with gabapentin, Ativan.  -If patient's blood pressures remain sustained, can add clonidine per  -Stop IVF  -Intake and output,  daily weights  -Repeat CMP in a.m.  -IV Protonix daily  -X-ray esophagram when patient's nausea and vomiting has improved/resolved and COVID ruled out based on possible distal esophageal wall thickening on CT.  -HIV pending given lipodystrophy, encephalopathy.     Hematemesis - Resolved  By history, most consistent with Kayla-Edwards tear.  Patient with bilious emesis in the emergency department without evidence of blood.  -CBC in AM  -Currently with pneumatic compression devices; monitor for evidence of hematemesis     Hyperkalemia - resolved  Assessment: Suspect secondary to profound acidosis.  Has normalized with correction of acidosis and initiation of insulin therapy   Plan:  - Follow BMP     Seizure disorder: Patient with a history of seizure disorder on Keppra 500 mg twice daily.  No event with seizure-like activity described prior to admission.  Plan:  -Keppra cotinued     Moderate to severe protein calorie malnutrition: Difficult to assess degree of malnutrition given patient reports no oral intake for the past week.  Note that when she moved from [place] to [place] in 2017 and was hospitalized for cholecystectomy for possible symptomatic cholelithiasis at [facility], she reported a 20 pound weight loss at that time and weighed 134 pounds.  Weight currently documented as 130 pounds.  -Daily weights  -N.p.o. currently; following diet advancement, consider nutrition consultation.     History of empyema versus hemothorax  Assessment:   Patient with scars on her left flank consistent with chest tube placement and possible VATS.  Reports that she was hospitalized at [facility] in [place] for 1 month, required blood transfusion during that hospitalization though cannot provide further details.  When asked about the year that this occurred she initially tells me it was between the years 2020 and 2022.  When reminded that it is currently the year 2020, she tells me that it was in  [id number].  This portion of her history is where her encephalopathy is most notable.  By review of outside records, it appears that this might have been in 2016 prior to her moving to [place] in 2017.  Plan:  -Request records from [facility]      History of Hodgkin's lymphoma status post chemotherapy          Diet: NPO for Medical/Clinical Reasons Except for: Ice Chips, Meds    DVT Prophylaxis: Pneumatic Compression Devices  Vasques Catheter: not present  Code Status: Full Code      Disposition Plan   Expected discharge: 2 - 3 days, recommended to prior living arrangement once O2 use less than 2 liters/minute.  Entered: Lucas Moreno MD 05/04/2020, 3:56 PM       The patient's care was discussed with the Bedside Nurse and Patient.    Lucas Moreno MD  Hospitalist Service  [facility]    ______________________________________________________________________    Interval History     No CP this AM  No fevers or chills  Feels breathing much improved  No nausea/vomiting or abdominal pain  No new complaints from patient otherwise.    Data reviewed today: I reviewed all medications, new labs and imaging results over the last 24 hours. I personally reviewed no images or EKG's today.    Physical Exam   Vital Signs: Temp: 99.8  F (37.7  C) Temp src: Oral BP: 96/68 Pulse: 133 Heart Rate: 128 Resp: 25 SpO2: 100 % O2 Device: Nasal cannula Oxygen Delivery: 4 LPM  Weight: 123 lbs .27 oz    General Appearance: no apparent distress  Eyes: No scleral icterus or injection  Respiratory: Diminished at bases.   Cardiovascular: Tachycardia to the 120 range.  No appreciable murmur.  GI: Abdomen soft, non-tender.  Bowel sounds present.  No palpable mass  Lymph/Hematologic: No lower extremity edema  Skin: No rases.  Neurologic: Alert, conversant, appropriate in conversation. Moving all extremities.   Psychiatric: normal mood and affect    Data   Recent Labs   Lab 05/04/20  0535 05/04/20  0506 05/04/20  0000  05/03/20  1810 05/03/20  1600 05/03/20  1213  05/03/20  0145   WBC  --  12.2* 10.4 3.3*  --   --   --  13.1*   HGB  --  13.3 14.9 13.9  --  13.1  --  16.8*   MCV  --  95 94 94  --   --   --  99   PLT  --  186 205 203  --   --   --  338   INR  --   --   --   --   --   --   --  1.26*     --  132* 132*  --  134   < > 127*   POTASSIUM 3.8  --  3.2* 3.4  --  3.7   < > 6.1*   CHLORIDE 102  --  99 101  --  103   < > 93*   CO2 24  --  23 22  --  19*   < > 8*   BUN 3*  --  4* 5*  --  7   < > 17   CR 0.62  --  0.56 0.50*  --  0.66   < > 0.96   ANIONGAP 8  --  10 9  --  12   < > 26*   DONOVAN 7.8*  --  8.0* 7.8*  --  8.4*   < > 8.4*   *  --  125* 148*  --  179*   < > 356*   ALBUMIN 3.3*  --   --   --   --   --   --  5.1*   PROTTOTAL 6.8  --   --   --   --   --   --  10.2*   BILITOTAL 1.0  --   --   --   --   --   --  1.0   ALKPHOS 50  --   --   --   --   --   --  85   ALT 28  --   --   --   --   --   --  46   AST 52*  --   --   --   --   --   --  68*   LIPASE  --   --   --   --   --   --   --  141   TROPI 0.445*  --   --   --  0.746* 0.498*   < > <0.015    < > = values in this interval not displayed.     Recent Results (from the past 24 hour(s))   XR Chest Port 1 View    Narrative    CHEST ONE VIEW  5/4/2020 10:52 AM     HISTORY: Evaluate for aspiration pneumonia.. Hematemesis, cough    COMPARISON: 5/3/2020      Impression    IMPRESSION: A right PICC is present. The tip overlies the lower SVC.  Left basilar opacities have increased and are concerning for airspace  disease. There may be trace left pleural fluid. No pneumothorax. No  interstitial edema. The heart is normal in size.    LESTER J FAHRNER, MD     Medications     dextrose       dextrose 5% and 0.45% NaCl 10 mL/hr at 05/04/20 0400     insulin (regular) Stopped (05/03/20 1610)     - MEDICATION INSTRUCTIONS -         [START ON 5/6/2020] folic acid  1 mg Oral Daily     folic acid  1 mg Intravenous Daily     [START ON 5/10/2020] gabapentin  100 mg Oral Q8H      [START ON 5/8/2020] gabapentin  300 mg Oral Q8H     [START ON 5/6/2020] gabapentin  600 mg Oral Q8H     gabapentin  900 mg Oral Q8H     levETIRAcetam  1,000 mg Intravenous Q12H     multivitamin w/minerals  1 tablet Oral Daily     pantoprazole (PROTONIX) IV  40 mg Intravenous Daily with breakfast     piperacillin-tazobactam  3.375 g Intravenous Q6H     thiamine  200 mg Intravenous TID     [START ON 5/5/2020] thiamine  100 mg Oral TID     [START ON 5/10/2020] thiamine  100 mg Oral Daily

## 2020-05-04 NOTE — PROGRESS NOTES
05/04/20 0919   Quick Adds   Type of Visit Initial Occupational Therapy Evaluation   Living Environment   Lives With significant other   Living Arrangements apartment   Living Environment Comment 3 steps   Self-Care   Usual Activity Tolerance good   Current Activity Tolerance fair   Regular Exercise Yes   Activity/Exercise Type walking   Exercise Amount/Frequency daily   Equipment Currently Used at Home none   Activity/Exercise/Self-Care Comment Works at the [employer], walks to work everyday   Functional Level   Ambulation 0-->independent   Transferring 0-->independent   Toileting 0-->independent   Bathing 0-->independent   Dressing 0-->independent   Eating 0-->independent   Communication 0-->understands/communicates without difficulty   Swallowing 0-->swallows foods/liquids without difficulty   Cognition 0 - no cognition issues reported   Fall history within last six months no   General Information   Onset of Illness/Injury or Date of Surgery - Date 05/02/20   Referring Physician Baez, Willem French MD    Patient/Family Goals Statement Go home when able   Additional Occupational Profile Info/Pertinent History of Current Problem Darya Hamilton is a 24 year old female admitted on 5/3/2020. She presents the emergency department for evaluation of ongoing emesis, report of hematemesis at home. Pt with Hyperkalemia, Tachycardia, cardiomyopathy with EF 20-25%, with history of History of Hodgkin's lymphoma status post chemotherapy and seizure disorder.   Precautions/Limitations fall precautions   General Observations 's-150's at rest   Cognitive Status Examination   Orientation orientation to person, place and time   Level of Consciousness alert   Follows Commands (Cognition) WNL   Visual Perception   Visual Perception No deficits were identified   Sensory Examination   Sensory Quick Adds No deficits were identified   Pain Assessment   Patient Currently in Pain No   Range of Motion (ROM)   ROM Quick Adds No  deficits were identified   Strength   Manual Muscle Testing Quick Adds No deficits were identified   Hand Strength   Hand Strength Comments good, B hands   Muscle Tone Assessment   Muscle Tone Quick Adds No deficits were identified   Coordination   Upper Extremity Coordination No deficits were identified   Mobility   Bed Mobility Comments (I) supine <>EOB   Transfer Skill: Bed to Chair/Chair to Bed   Level of Tuttle: Bed to Chair stand-by assist   Transfer Skill: Sit to Stand   Level of Tuttle: Sit/Stand stand-by assist   Transfer Skill: Toilet Transfer   Level of Tuttle: Toilet stand-by assist   Toilet Transfer Skill Comments bedside commode   Balance   Balance Comments SBA with bed to commode   Upper Body Dressing   Level of Tuttle: Dress Upper Body stand-by assist   Lower Body Dressing   Level of Tuttle: Dress Lower Body stand-by assist   Toileting   Level of Tuttle: Toilet stand-by assist   Grooming   Level of Tuttle: Grooming stand-by assist   Eating/Self Feeding   Level of Tuttle: Eating independent   Instrumental Activities of Daily Living (IADL)   Previous Responsibilities meal prep;housekeeping;laundry;shopping;medication management;finances;work   Activities of Daily Living Analysis   Impairments Contributing to Impaired Activities of Daily Living balance impaired;cognition impaired   General Therapy Interventions   Planned Therapy Interventions ADL retraining;cognition;transfer training   Clinical Impression   Criteria for Skilled Therapeutic Interventions Met yes, treatment indicated   OT Diagnosis Impaired cognition   Influenced by the following impairments medical status   Assessment of Occupational Performance 1-3 Performance Deficits   Identified Performance Deficits IADL   Clinical Decision Making (Complexity) Moderate complexity   Therapy Frequency Daily   Predicted Duration of Therapy Intervention (days/wks) 3 days   Anticipated Discharge Disposition  Home   Risks and Benefits of Treatment have been explained. Yes   Patient, Family & other staff in agreement with plan of care Yes   Total Evaluation Time   Total Evaluation Time (Minutes) 10

## 2020-05-04 NOTE — PROGRESS NOTES
Patient oriented. ciwa negative. Up to bedside commode with standby assist. Tele sinus tach, bp stable. Tolerating small amount of clear liquids today. No emesis. Voiding edwardo urine. Transferred to heart center after calling report to receiving nurse.

## 2020-05-05 ENCOUNTER — APPOINTMENT (OUTPATIENT)
Dept: OCCUPATIONAL THERAPY | Facility: CLINIC | Age: 25
DRG: 368 | End: 2020-05-05

## 2020-05-05 LAB
ERYTHROCYTE [DISTWIDTH] IN BLOOD BY AUTOMATED COUNT: 12.2 % (ref 10–15)
HCT VFR BLD AUTO: 36.6 % (ref 35–47)
HGB BLD-MCNC: 12.7 G/DL (ref 11.7–15.7)
MCH RBC QN AUTO: 33.2 PG (ref 26.5–33)
MCHC RBC AUTO-ENTMCNC: 34.7 G/DL (ref 31.5–36.5)
MCV RBC AUTO: 96 FL (ref 78–100)
PLATELET # BLD AUTO: 154 10E9/L (ref 150–450)
RBC # BLD AUTO: 3.83 10E12/L (ref 3.8–5.2)
WBC # BLD AUTO: 5.4 10E9/L (ref 4–11)

## 2020-05-05 PROCEDURE — 25000132 ZZH RX MED GY IP 250 OP 250 PS 637: Performed by: NURSE PRACTITIONER

## 2020-05-05 PROCEDURE — C9113 INJ PANTOPRAZOLE SODIUM, VIA: HCPCS | Performed by: INTERNAL MEDICINE

## 2020-05-05 PROCEDURE — 25000128 H RX IP 250 OP 636: Performed by: INTERNAL MEDICINE

## 2020-05-05 PROCEDURE — 99232 SBSQ HOSP IP/OBS MODERATE 35: CPT | Performed by: INTERNAL MEDICINE

## 2020-05-05 PROCEDURE — 40000239 ZZH STATISTIC VAT ROUNDS

## 2020-05-05 PROCEDURE — 25000132 ZZH RX MED GY IP 250 OP 250 PS 637: Performed by: INTERNAL MEDICINE

## 2020-05-05 PROCEDURE — 25000125 ZZHC RX 250: Performed by: INTERNAL MEDICINE

## 2020-05-05 PROCEDURE — 99232 SBSQ HOSP IP/OBS MODERATE 35: CPT | Performed by: STUDENT IN AN ORGANIZED HEALTH CARE EDUCATION/TRAINING PROGRAM

## 2020-05-05 PROCEDURE — 85027 COMPLETE CBC AUTOMATED: CPT | Performed by: STUDENT IN AN ORGANIZED HEALTH CARE EDUCATION/TRAINING PROGRAM

## 2020-05-05 PROCEDURE — 21000001 ZZH R&B HEART CARE

## 2020-05-05 PROCEDURE — 25000125 ZZHC RX 250: Performed by: STUDENT IN AN ORGANIZED HEALTH CARE EDUCATION/TRAINING PROGRAM

## 2020-05-05 PROCEDURE — 97530 THERAPEUTIC ACTIVITIES: CPT | Mod: GO | Performed by: OCCUPATIONAL THERAPIST

## 2020-05-05 PROCEDURE — 25000128 H RX IP 250 OP 636: Performed by: STUDENT IN AN ORGANIZED HEALTH CARE EDUCATION/TRAINING PROGRAM

## 2020-05-05 PROCEDURE — 25000132 ZZH RX MED GY IP 250 OP 250 PS 637: Performed by: STUDENT IN AN ORGANIZED HEALTH CARE EDUCATION/TRAINING PROGRAM

## 2020-05-05 PROCEDURE — C9113 INJ PANTOPRAZOLE SODIUM, VIA: HCPCS | Performed by: STUDENT IN AN ORGANIZED HEALTH CARE EDUCATION/TRAINING PROGRAM

## 2020-05-05 PROCEDURE — 97535 SELF CARE MNGMENT TRAINING: CPT | Mod: GO | Performed by: OCCUPATIONAL THERAPIST

## 2020-05-05 RX ORDER — METOPROLOL SUCCINATE 25 MG/1
25 TABLET, EXTENDED RELEASE ORAL DAILY
Status: DISCONTINUED | OUTPATIENT
Start: 2020-05-05 | End: 2020-05-05

## 2020-05-05 RX ADMIN — THIAMINE HCL TAB 100 MG 100 MG: 100 TAB at 15:08

## 2020-05-05 RX ADMIN — LEVETIRACETAM 1000 MG: 10 INJECTION INTRAVENOUS at 08:37

## 2020-05-05 RX ADMIN — THIAMINE HCL TAB 100 MG 100 MG: 100 TAB at 21:14

## 2020-05-05 RX ADMIN — PIPERACILLIN AND TAZOBACTAM 3.38 G: 3; .375 INJECTION, POWDER, FOR SOLUTION INTRAVENOUS at 20:17

## 2020-05-05 RX ADMIN — PIPERACILLIN AND TAZOBACTAM 3.38 G: 3; .375 INJECTION, POWDER, FOR SOLUTION INTRAVENOUS at 03:04

## 2020-05-05 RX ADMIN — TOPICAL ANESTHETIC 0.5 ML: 200 SPRAY DENTAL; PERIODONTAL at 12:54

## 2020-05-05 RX ADMIN — PANTOPRAZOLE SODIUM 40 MG: 40 INJECTION, POWDER, FOR SOLUTION INTRAVENOUS at 08:29

## 2020-05-05 RX ADMIN — GABAPENTIN 900 MG: 300 CAPSULE ORAL at 15:42

## 2020-05-05 RX ADMIN — METOPROLOL SUCCINATE 12.5 MG: 25 TABLET, EXTENDED RELEASE ORAL at 15:17

## 2020-05-05 RX ADMIN — FOLIC ACID 1 MG: 5 INJECTION, SOLUTION INTRAMUSCULAR; INTRAVENOUS; SUBCUTANEOUS at 08:36

## 2020-05-05 RX ADMIN — PIPERACILLIN AND TAZOBACTAM 3.38 G: 3; .375 INJECTION, POWDER, FOR SOLUTION INTRAVENOUS at 15:08

## 2020-05-05 RX ADMIN — PIPERACILLIN AND TAZOBACTAM 3.38 G: 3; .375 INJECTION, POWDER, FOR SOLUTION INTRAVENOUS at 08:36

## 2020-05-05 RX ADMIN — GABAPENTIN 900 MG: 300 CAPSULE ORAL at 00:51

## 2020-05-05 RX ADMIN — THIAMINE HCL TAB 100 MG 100 MG: 100 TAB at 08:30

## 2020-05-05 RX ADMIN — GABAPENTIN 900 MG: 300 CAPSULE ORAL at 08:30

## 2020-05-05 RX ADMIN — LEVETIRACETAM 1000 MG: 10 INJECTION INTRAVENOUS at 21:14

## 2020-05-05 RX ADMIN — MULTIPLE VITAMINS W/ MINERALS TAB 1 TABLET: TAB at 08:29

## 2020-05-05 ASSESSMENT — ACTIVITIES OF DAILY LIVING (ADL)
ADLS_ACUITY_SCORE: 12

## 2020-05-05 ASSESSMENT — MIFFLIN-ST. JEOR: SCORE: 1276.9

## 2020-05-05 NOTE — PLAN OF CARE
A&Ox4 and up independently in room. VSS on RA ex elevated HR and tolerating clears. PICC SL and Tele: Sinus Tachy. Sepsis protocol initiated. Denies pain, SOB, numbness, tingling, nausea, and dizziness. Continue to monitor.

## 2020-05-05 NOTE — PLAN OF CARE
Neuro- A&O  Most Recent Vitals- Temp: 98.7  F (37.1  C) Temp src: Oral BP: 104/74 Pulse: 138 Heart Rate: 135 Resp: 16 SpO2: 95 % O2 Device: None (Room air) Oxygen Delivery: 3 LPM  Tele - ST (130's at rest, 160's w/ activity)  Cardiac- WNL  Resp- WNL  O2- RA  Activity- Limited activity w/ HR, up to bedside commode ,SBA  Pain- Denies  Drips- abx  Drains/Tubes- R picc  Aggression Color- Green  Plan- Initiate HF/LV meds when BP tolerates it, IV Abx, monitor HR    Willem Christianson RN

## 2020-05-05 NOTE — PROGRESS NOTES
Sepsis Evaluation Progress Note    I was called to see Darya Hamilton due to abnormal vital signs triggering the Sepsis SIRS screening alert. She is known to have an infection.     Physical Exam   Vital Signs:  Temp: 99.1  F (37.3  C) Temp src: Oral BP: (!) 87/60 Pulse: 126 Heart Rate: 66 Resp: 18 SpO2: 96 % O2 Device: None (Room air) Oxygen Delivery: 3 LPM    Lab:  Lactic Acid   Date Value Ref Range Status   05/04/2020 1.6 0.7 - 2.0 mmol/L Final       The patient is at baseline mental status.     The rest of their physical exam is significant for no changes    Assessment & Plan   NO EVIDENCE OF SEPSIS at this time.  Vital sign, physical exam, and lab findings are likely due to tachycardia, congestive heart failure. She is already on zosyn for possible aspiration pneumonia.     Disposition: The patient will remain on the current unit. We will continue to monitor this patient closely..  Kim Rivas MD    Sepsis Criteria   Sepsis: 2+ SIRS criteria due to infection  Severe Sepsis: Sepsis AND 1+ new sign of acute organ dysfunction (Note: lactate >2 is organ dysfunction)  Septic Shock: Sepsis AND hypotension despite volume resuscitation with 30 ml/kg crystalloid

## 2020-05-05 NOTE — PLAN OF CARE
BP stable in the 90's. HR remains 115-130 at rest, ST. Asymptomatic. Slight nausea improved with ginger ale. Independent. CIWA scores ordered, not scoring high enough to receive treatment. Started Toprol XL today. Monitor BP/HR and potential discharge tomorrow. ABX for potential aspiration PNA.

## 2020-05-05 NOTE — PROGRESS NOTES
[facility]    Medicine Progress Note - Hospitalist Service       Date of Admission:  5/3/2020  Date of Service: 05/05/2020    Assessment & Plan   Darya Hamilton is a 24 year old female admitted on 5/3/2020. She presents the emergency department for evaluation of ongoing emesis, report of hematemesis at home     Sinus Tachycardic  HFrEF   Assessment: Suspect primarily secondary to hypovolemia, though possible also confusion from alcohol withdrawal.  Supraventricular tachycardia, symptomatic ventricular tachycardia: Patient with self-limited episodes of SVT as well as 1 minute episode of ventricular tachycardia in the emergency department.  Became increasingly lightheaded with ventricular tachycardia despite reduction in rate from 180 range to 150 range. Suspect dysrhythmias associated with significant acidosis. ECHO showx Severe LV systolic dysfunction (EF 20-25%), likely due to stress-induced cardiomyopathy  Plan:  - Cardiology following  - Continue Telemetry  - Transfer to [facility]  - initiate cardiac meds for HF/LV dysfunction likely tomorrow. Cardiology planning on starting start BB [id number]   - add ACEI if BP tolerates tomorrow before discharge.     Lactic acidosis, ketoacidosis  Aspiration PNA?  Acute Hypoxic Respiratory Failure  Assessment:  Suspect ketoacidosis secondary to starvation ketoacidosis and lactic acidosis related to hypovolemia. O2 needs now declining, resp status stable and improving. COVID-10 PCR was negative.   Plan:  -Continue IV Zosyn  -Blood cultures x2 pending    Nausea and vomiting  Assessment: Patient reports no prior history of alcohol withdrawal, though note prior hepatic enzyme elevations, currently with 2-1 AST to ALT ratio and psychomotor agitation, reports hallucinosis at home with seeing letters on the ceiling.  -CIWA protocol with gabapentin, Ativan.  -If patient's blood pressures remain sustained, can add clonidine per  -Stop IVF  -Intake and output, daily  weights  -Repeat CMP in a.m.  -IV Protonix daily  -X-ray esophagram when patient's nausea and vomiting has improved/resolved and COVID ruled out based on possible distal esophageal wall thickening on CT.  -HIV pending given lipodystrophy, encephalopathy.     Hematemesis - Resolved  By history, most consistent with Kayla-Edwards tear.  Patient with bilious emesis in the emergency department without evidence of blood. Hgb stable  Plan:  -Currently with pneumatic compression devices; monitor for evidence of hematemesis     Hyperkalemia - resolved  Assessment: Suspect secondary to profound acidosis.  Has normalized with correction of acidosis and initiation of insulin therapy   Plan:  - Follow BMP     Seizure disorder: Patient with a history of seizure disorder on Keppra 500 mg twice daily.  No event with seizure-like activity described prior to admission.  Plan:  -Keppra cotinued     Moderate to severe protein calorie malnutrition: Difficult to assess degree of malnutrition given patient reports no oral intake for the past week.  Note that when she moved from [place] to [place] in 2017 and was hospitalized for cholecystectomy for possible symptomatic cholelithiasis at [facility], she reported a 20 pound weight loss at that time and weighed 134 pounds.  Weight currently documented as 130 pounds.  -Daily weights  -ADAT     History of empyema versus hemothorax  Assessment:   Patient with scars on her left flank consistent with chest tube placement and possible VATS.  Reports that she was hospitalized at [facility] in [place] for 1 month, required blood transfusion during that hospitalization though cannot provide further details.  When asked about the year that this occurred she initially tells me it was between the years 2020 and 2022.  When reminded that it is currently the year 2020, she tells me that it was in [id number].  This portion of her history is where her encephalopathy is most  notable.  By review of outside records, it appears that this might have been in 2016 prior to her moving to [place] in 2017.      History of Hodgkin's lymphoma status post chemotherapy          Diet: Full Liquid Diet    DVT Prophylaxis: Pneumatic Compression Devices  Vasques Catheter: not present  Code Status: Full Code      Disposition Plan   Expected discharge: Tomorrow, recommended to prior living arrangement once O2 use less than 2 liters/minute.  Entered: Lucas Moreno MD 05/05/2020, 3:55 PM       The patient's care was discussed with the Bedside Nurse and Patient.    Lucas Moreno MD  Hospitalist Service  [facility]    ______________________________________________________________________    Interval History     No CP this AM, breathing much better again today  No fevers or chills  No nausea/vomiting or abdominal pain  No new complaints from patient otherwise.    Data reviewed today: I reviewed all medications, new labs and imaging results over the last 24 hours. I personally reviewed no images or EKG's today.    Physical Exam   Vital Signs: Temp: 99.3  F (37.4  C) Temp src: Oral BP: 97/68 Pulse: 111 Heart Rate: 124 Resp: 16 SpO2: 98 % O2 Device: None (Room air)    Weight: 125 lbs 3.2 oz    General Appearance: no apparent distress  Eyes: No scleral icterus or injection  Respiratory: Diminished at bases.   Cardiovascular: Tachycardia to the 140-160s range.  GI: Abdomen soft, non-tender.  Bowel sounds present.  No palpable mass  Lymph/Hematologic: No lower extremity edema  Skin: No rases.  Neurologic: Alert, conversant, appropriate in conversation. Moving all extremities.   Psychiatric: normal mood and affect    Data   Recent Labs   Lab 05/05/20  0540 05/04/20  0535 05/04/20  0506 05/04/20  0000 05/03/20  1810 05/03/20  1600 05/03/20  1213  05/03/20  0145   WBC 5.4  --  12.2* 10.4 3.3*  --   --   --  13.1*   HGB 12.7  --  13.3 14.9 13.9  --  13.1  --  16.8*   MCV 96  --  95 94 94  --   --   --  99      --  186 205 203  --   --   --  338   INR  --   --   --   --   --   --   --   --  1.26*   NA  --  134  --  132* 132*  --  134   < > 127*   POTASSIUM  --  3.8  --  3.2* 3.4  --  3.7   < > 6.1*   CHLORIDE  --  102  --  99 101  --  103   < > 93*   CO2  --  24  --  23 22  --  19*   < > 8*   BUN  --  3*  --  4* 5*  --  7   < > 17   CR  --  0.62  --  0.56 0.50*  --  0.66   < > 0.96   ANIONGAP  --  8  --  10 9  --  12   < > 26*   DONOVAN  --  7.8*  --  8.0* 7.8*  --  8.4*   < > 8.4*   GLC  --  138*  --  125* 148*  --  179*   < > 356*   ALBUMIN  --  3.3*  --   --   --   --   --   --  5.1*   PROTTOTAL  --  6.8  --   --   --   --   --   --  10.2*   BILITOTAL  --  1.0  --   --   --   --   --   --  1.0   ALKPHOS  --  50  --   --   --   --   --   --  85   ALT  --  28  --   --   --   --   --   --  46   AST  --  52*  --   --   --   --   --   --  68*   LIPASE  --   --   --   --   --   --   --   --  141   TROPI  --  0.445*  --   --   --  0.746* 0.498*   < > <0.015    < > = values in this interval not displayed.     No results found for this or any previous visit (from the past 24 hour(s)).  Medications     dextrose       dextrose 5% and 0.45% NaCl 10 mL/hr at 05/04/20 0400     - MEDICATION INSTRUCTIONS -         [START ON 5/6/2020] folic acid  1 mg Oral Daily     [START ON 5/10/2020] gabapentin  100 mg Oral Q8H     [START ON 5/8/2020] gabapentin  300 mg Oral Q8H     [START ON 5/6/2020] gabapentin  600 mg Oral Q8H     gabapentin  900 mg Oral Q8H     levETIRAcetam  1,000 mg Intravenous Q12H     metoprolol succinate ER  12.5 mg Oral Daily     multivitamin w/minerals  1 tablet Oral Daily     pantoprazole (PROTONIX) IV  40 mg Intravenous Daily with breakfast     piperacillin-tazobactam  3.375 g Intravenous Q6H     thiamine  100 mg Oral TID     [START ON 5/10/2020] thiamine  100 mg Oral Daily

## 2020-05-05 NOTE — PLAN OF CARE
Discharge Planner OT   Patient plan for discharge: home  Current status: Pt ambulated to BR and back and completed toilet transfer with supervision/independent. HR increased to 168, other vitals stable. Pt completed [facility] cognitive screen to assess safety with IADl's, pt scored 28/30 indicating normal cognition, not concerns. No further OT needs warranted. Pt in agreement.   Barriers to return to prior living situation: none  Recommendations for discharge: home  Rationale for recommendations: pt met goals. No further OT needs warranted.     Occupational Therapy Discharge Summary    Reason for therapy discharge:    Discharged to met goals, anticipate when medically appropriate.     Progress towards therapy goal(s). See goals on Care Plan in [facility] electronic health record for goal details.  Goals met    Therapy recommendation(s):    No further therapy is recommended.           Entered by: Agata Strickland 05/05/2020 10:40 AM

## 2020-05-05 NOTE — PROGRESS NOTES
[facility] Cardiology Progress Note          Assessment and Plan:   1. Severe LV systolic dysfunction (EF 20-25%), likely due to stress-induced cardiomyopathy  2. HFrEF  3. Sinus tachycardia, compensatory   4. Electrolyte derangement   5. Nausea and vomiting  6. Hypoxic respiratory failure, resolving    -- She is clinically improving. Respiratory status has improved significantly improved overnight. Still tachycardic with rates up to 160s while asymptomatic.    -- Will initiate cardiac meds for HF/LV dysfunction.  Start toprol XL 12.5 mg today, add and up titrate as able.    -- Continue support care        Interval History:   Resting comfortably in bed on RA, still with a sinus tachy         Review of Systems:   POSITIVE FOR DYSPNEA       Medications:     No current outpatient medications on file.               Physical Exam:   All vitals have been reviewed  Patient Vitals for the past 24 hrs:   BP Temp Temp src Pulse Heart Rate Resp SpO2 Weight   05/05/20 1250 105/82 -- -- 135 -- -- -- --   05/05/20 1025 100/76 -- -- -- 168 -- 95 % --   05/05/20 0728 94/61 98.5  F (36.9  C) Oral -- 134 16 97 % --   05/05/20 0535 -- -- -- -- -- -- -- 56.8 kg (125 lb 3.2 oz)   05/05/20 0500 104/74 98.7  F (37.1  C) Oral -- 135 16 95 % --   05/05/20 0048 91/59 99.3  F (37.4  C) Oral 138 125 16 96 % --   05/04/20 2340 92/67 -- -- -- 130 -- -- --   05/04/20 2300 (!) 83/67 -- -- -- -- -- -- --   05/04/20 2229 (!) 87/60 -- -- -- -- -- -- --   05/04/20 2212 (!) 85/60 -- -- -- -- -- -- --   05/04/20 1659 -- 99.1  F (37.3  C) Oral -- 66 18 96 % --   05/04/20 1600 -- -- -- 126 129 21 94 % --   05/04/20 1500 90/71 -- -- 111 124 22 100 % --   05/04/20 1400 97/66 -- -- 125 129 22 98 % --       Intake/Output Summary (Last 24 hours) at 5/4/2020 1255  Last data filed at 5/4/2020 1100  Gross per 24 hour   Intake 2972.96 ml   Output 2575 ml   Net 397.96 ml     Gen: Comfortable on RA  Neck: JVP not elevated  Lung: diminished throughout  CV:  Tachycardic  Ext: no edema          Data:   All laboratory data reviewed  ROUTINE IP LABS (Last four results)  BMP  Recent Labs   Lab 05/04/20  0535 05/04/20  0000 05/03/20  1810 05/03/20  1213    132* 132* 134   POTASSIUM 3.8 3.2* 3.4 3.7   CHLORIDE 102 99 101 103   DONOVAN 7.8* 8.0* 7.8* 8.4*   CO2 24 23 22 19*   BUN 3* 4* 5* 7   CR 0.62 0.56 0.50* 0.66   * 125* 148* 179*     CBC  Recent Labs   Lab 05/05/20  0540 05/04/20  0506 05/04/20  0000 05/03/20  1810   WBC 5.4 12.2* 10.4 3.3*   RBC 3.83 4.00 4.44 4.21   HGB 12.7 13.3 14.9 13.9   HCT 36.6 38.0 41.8 39.7   MCV 96 95 94 94   MCH 33.2* 33.3* 33.6* 33.0   MCHC 34.7 35.0 35.6 35.0   RDW 12.2 12.2 12.2 12.2    186 205 203     INR  Recent Labs   Lab 05/03/20  0145   INR 1.26*                  Attestation:  I have reviewed today's vital signs, notes, medications, labs and imaging.  Amount of time performed on this hospital visit: 30 minutes.     Irving Martin MD

## 2020-05-06 VITALS
HEART RATE: 112 BPM | DIASTOLIC BLOOD PRESSURE: 65 MMHG | SYSTOLIC BLOOD PRESSURE: 88 MMHG | HEIGHT: 63 IN | TEMPERATURE: 98.4 F | RESPIRATION RATE: 16 BRPM | BODY MASS INDEX: 21.49 KG/M2 | OXYGEN SATURATION: 98 % | WEIGHT: 121.3 LBS

## 2020-05-06 LAB
ANION GAP SERPL CALCULATED.3IONS-SCNC: 4 MMOL/L (ref 3–14)
BUN SERPL-MCNC: 2 MG/DL (ref 7–30)
CALCIUM SERPL-MCNC: 8.7 MG/DL (ref 8.5–10.1)
CHLORIDE SERPL-SCNC: 108 MMOL/L (ref 94–109)
CO2 SERPL-SCNC: 28 MMOL/L (ref 20–32)
CREAT SERPL-MCNC: 0.6 MG/DL (ref 0.52–1.04)
GFR SERPL CREATININE-BSD FRML MDRD: >90 ML/MIN/{1.73_M2}
GLUCOSE SERPL-MCNC: 89 MG/DL (ref 70–99)
INTERPRETATION ECG - MUSE: NORMAL
LACTATE BLD-SCNC: 1.6 MMOL/L (ref 0.7–2)
POTASSIUM SERPL-SCNC: 3.1 MMOL/L (ref 3.4–5.3)
SODIUM SERPL-SCNC: 140 MMOL/L (ref 133–144)

## 2020-05-06 PROCEDURE — 80048 BASIC METABOLIC PNL TOTAL CA: CPT | Performed by: STUDENT IN AN ORGANIZED HEALTH CARE EDUCATION/TRAINING PROGRAM

## 2020-05-06 PROCEDURE — 99239 HOSP IP/OBS DSCHRG MGMT >30: CPT | Performed by: STUDENT IN AN ORGANIZED HEALTH CARE EDUCATION/TRAINING PROGRAM

## 2020-05-06 PROCEDURE — 40000257 ZZH STATISTIC CONSULT NO CHARGE VASC ACCESS

## 2020-05-06 PROCEDURE — 40000895 ZZH STATISTIC SLP IP EVAL DEFER: Performed by: SPEECH-LANGUAGE PATHOLOGIST

## 2020-05-06 PROCEDURE — 40000239 ZZH STATISTIC VAT ROUNDS

## 2020-05-06 PROCEDURE — 83605 ASSAY OF LACTIC ACID: CPT | Performed by: STUDENT IN AN ORGANIZED HEALTH CARE EDUCATION/TRAINING PROGRAM

## 2020-05-06 PROCEDURE — 25000132 ZZH RX MED GY IP 250 OP 250 PS 637: Performed by: NURSE PRACTITIONER

## 2020-05-06 PROCEDURE — 25000128 H RX IP 250 OP 636: Performed by: STUDENT IN AN ORGANIZED HEALTH CARE EDUCATION/TRAINING PROGRAM

## 2020-05-06 PROCEDURE — 99232 SBSQ HOSP IP/OBS MODERATE 35: CPT | Mod: 25 | Performed by: INTERNAL MEDICINE

## 2020-05-06 PROCEDURE — 25000132 ZZH RX MED GY IP 250 OP 250 PS 637: Performed by: STUDENT IN AN ORGANIZED HEALTH CARE EDUCATION/TRAINING PROGRAM

## 2020-05-06 PROCEDURE — C9113 INJ PANTOPRAZOLE SODIUM, VIA: HCPCS | Performed by: STUDENT IN AN ORGANIZED HEALTH CARE EDUCATION/TRAINING PROGRAM

## 2020-05-06 RX ORDER — METOPROLOL SUCCINATE 25 MG/1
25 TABLET, EXTENDED RELEASE ORAL DAILY
Status: DISCONTINUED | OUTPATIENT
Start: 2020-05-07 | End: 2020-05-06 | Stop reason: HOSPADM

## 2020-05-06 RX ORDER — PANTOPRAZOLE SODIUM 40 MG/1
40 TABLET, DELAYED RELEASE ORAL DAILY
Qty: 30 TABLET | Refills: 0 | Status: SHIPPED | OUTPATIENT
Start: 2020-05-06 | End: 2020-10-21

## 2020-05-06 RX ORDER — METOPROLOL SUCCINATE 25 MG/1
25 TABLET, EXTENDED RELEASE ORAL DAILY
Qty: 60 TABLET | Refills: 0 | Status: SHIPPED | OUTPATIENT
Start: 2020-05-07

## 2020-05-06 RX ORDER — LANOLIN ALCOHOL/MO/W.PET/CERES
100 CREAM (GRAM) TOPICAL DAILY
Qty: 7 TABLET | Refills: 0 | Status: SHIPPED | OUTPATIENT
Start: 2020-05-10 | End: 2020-05-17

## 2020-05-06 RX ADMIN — PANTOPRAZOLE SODIUM 40 MG: 40 INJECTION, POWDER, FOR SOLUTION INTRAVENOUS at 08:46

## 2020-05-06 RX ADMIN — METOPROLOL SUCCINATE 12.5 MG: 25 TABLET, EXTENDED RELEASE ORAL at 10:52

## 2020-05-06 RX ADMIN — GABAPENTIN 900 MG: 300 CAPSULE ORAL at 00:38

## 2020-05-06 RX ADMIN — MULTIPLE VITAMINS W/ MINERALS TAB 1 TABLET: TAB at 08:47

## 2020-05-06 RX ADMIN — THIAMINE HCL TAB 100 MG 100 MG: 100 TAB at 08:47

## 2020-05-06 RX ADMIN — FOLIC ACID 1 MG: 1 TABLET ORAL at 08:47

## 2020-05-06 RX ADMIN — PIPERACILLIN AND TAZOBACTAM 3.38 G: 3; .375 INJECTION, POWDER, FOR SOLUTION INTRAVENOUS at 02:14

## 2020-05-06 RX ADMIN — PIPERACILLIN AND TAZOBACTAM 3.38 G: 3; .375 INJECTION, POWDER, FOR SOLUTION INTRAVENOUS at 08:46

## 2020-05-06 RX ADMIN — GABAPENTIN 900 MG: 300 CAPSULE ORAL at 08:47

## 2020-05-06 RX ADMIN — POTASSIUM CHLORIDE 40 MEQ: 1500 TABLET, EXTENDED RELEASE ORAL at 08:47

## 2020-05-06 RX ADMIN — METOPROLOL SUCCINATE 12.5 MG: 25 TABLET, EXTENDED RELEASE ORAL at 08:47

## 2020-05-06 RX ADMIN — LEVETIRACETAM 1000 MG: 10 INJECTION INTRAVENOUS at 08:46

## 2020-05-06 ASSESSMENT — ACTIVITIES OF DAILY LIVING (ADL)
ADLS_ACUITY_SCORE: 10
ADLS_ACUITY_SCORE: 12

## 2020-05-06 ASSESSMENT — MIFFLIN-ST. JEOR: SCORE: 1259.21

## 2020-05-06 NOTE — PLAN OF CARE
Discharge Planner SLP   Patient plan for discharge: Home today likely  Current status: Consulted with patient and care team re: clinical swallow evaluation order.  Patient reported feeling of pills being lodged in her esophagus, throat soreness following extensive vomiting prior to and at admission.  She is now reporting ability to swallow large pills or 2-3 small pills at a time without difficulty, denying any coughing or choking with breakfast today. No hx of dysphagia.  No formal inpatient evaluation indicated at this time, however, could consider OP esophagram if dysmotility sx recur. Recommend advance to soft regular diet choices as appropriate today.   Barriers to return to prior living situation: No swallowing barriers at this time.  Recommendations for discharge: Home per SLP needs.   Rationale for recommendations: No SLP services indicated at this time.        Entered by: Gabrielle Schmitz 05/06/2020 9:54 AM

## 2020-05-06 NOTE — PROGRESS NOTES
Care Transition Initial Assessment - RN        Met with: Patient.  DATA   Active Problems:    Paroxysmal ventricular tachycardia (H)    Ketoacidosis    Hyperkalemia    Delirium tremens (H)       Cognitive Status: awake and alert.     Contact information and PCP information verified: Yes, Unable to update our system. She states it is with Nitish Hi at [facility]  Lives With: significant other   Living Arrangements: apartment         Insurance concerns: Other  Pt is mising her insurance card and requested registration not to bill her insurance. Encouraged pt to call the business office as soon as she returns home to update them with a current insurance card.   ASSESSMENT  Patient currently receives the following services:  none        Identified issues/concerns regarding health management: Spoke with pt regarding no insurance listed on facesheet. Noted message left by registration in the account notes stating pt was self pay.  Spoke with pt regarding updating the business office with current insurance card as soon as able     PLAN  Financial costs for the patient include copays .  Patient given options and choices for discharge; yes .  Patient/family is agreeable to the plan?  Yes:   Patient anticipates discharging to Home with outpatient cardiology follow-up and PCP follow-up .        Patient anticipates needs for home equipment: No  Transportation/person available to transport on day of discharge was arrange by bedside nurse.  Plan/Disposition: Home   Appointments: From AVS       May 15, 2020 10:00 AM CDT   Video Visit with NINOSKA Haywood CNP   [facility] ([facility])  [address] [place] [id number]   [phone] OPT 2    [facility]  Note: this is not an onsite visit; there is no need to come to the facility.  Please have a list of all current medications available for  appointment.    Jul 20, 2020 10:00 AM CDT   Echo Complete with [id number]   [facility] CV Echocardiography (Card[facility] at [facility])  [address]   [address]   [place] [id number] [phone]    1. Please bring or wear a comfortable two-piece outfit.  2. You may eat, drink and take your normal medicines.  3. Please do not apply perfumes or lotions on the day of your exam.   4. For any questions that cannot be answered, please contact the ordering physician   Jul 24, 2020 10:15 AM CDT   Return Visit with Irving Martin MD   [facility] ([facility])  [address]   [facility] [id number] [phone] OPT 2      Care  (CTS) will continue to follow as needed.    Jolanta Fan RN BAN  Inpatient Care Coordination  [facility]  [address]  [place] [address]  [email or web address]   Office: [phone]  Fax: [phone]

## 2020-05-06 NOTE — DISCHARGE INSTRUCTIONS
A Cardiology follow-up appointment was recommended for you    DUE TO COVID-19 PANDEMIC, MANY CLINICS ARE TEMPORARILY NOT TAKING IN-PERSON APPOINTMENTS. ?  Your clinic was contacted by a hospital care coordinator, and a request was made for a visit. They will determine which type of visit is most appropriate for you. --They will call you so please answer your phone.  If you develop any symptoms or have any followup questions please call [facility] at [phone] option 2.      It is very important to check in with your provider--during the phone visit, talk about your hospital stay. Tell the clinic provider how you feel. Talk about the medications listed on the discharge papers. Your doctor will update records, make sure you are still doing OK, and decide if any tests or medication changes are needed.     Please have your current measured weight and medication list available for review. If possible, please check and record your blood pressures prior to the phone visit for the provider's review.     ____________________________________________________________________________        Heart Failure Instructions for Patients and Families: Please read and check off each of these important instructions as you do them when you get home.     Weight and Symptoms    ___ Put a scale in your bathroom.    ___ Post a weight chart or calendar next to your scale.    ___ Weigh yourself everyday as soon as you get up in the morning (before breakfast).  You should only be wearing your pajamas.  Write your weight on the chart/calendar.    ___ Bring your weight chart/calendar with you to all appointments.    ___ Call your doctor or nurse practitioner if you gain 2 pounds (in 1 day) or 5 pounds in (1 week) from your goal  good  weight.  Your good weight is also called your  dry  weight.  Your doctor or nurse will tell you what your good weight should be.    ___ Call your doctor or nurse practitioner if you have shortness of  breath that gets worse over time, leg swelling or fatigue.    Medications and Diet    ___ Make sure to take your medication as prescribed.    ___ Bring a current list of your medication and all of your medicine bottles with you to all appointments.    ___ Limit fluids if you still have swelling or shortness of breath, or if your doctor tells you to do so.      ___ Eat less than 2000 mg of sodium (salt) every day. Read food labels, and do not add salt to meals. Remember, if you eat less salt you retain less fluid.    ___ Follow a heart healthy diet that is low in saturated fat.         Activity and Suggested Lifestyle Changes   ___ Stay active. Talk to your doctor about an exercise program that is safe for your heart.    ___ Stop smoking. Reduce alcohol use.      ___ Lose weight if you are overweight. Extra weight puts a lot of stress on the heart.    Control for Leg Swelling   ___ Keep your legs elevated (raised) as needed for swelling. If swelling is uncomfortable or elevation doesn t help, ask your doctor about using ACE wraps or support stockings.      What is the [facility] Clinic?     Cardiomyopathy  Optimization  Rehabilitation  Education    The [facility] Clinic is a heart failure specialty clinic within [facility].  It is an outpatient disease management program that is based on a phase-by-phase approach, which is tailored to each patient s individual needs.  The cardiologist, nurse practitioner, physician assistant and nurses provide an ongoing outpatient care and treatment plan that guides heart failure and cardiomyopathy patients from evaluation and education to stabilization. This team works with your current primary care doctor and cardiologist to help you:      Avoid hospitalizations    Slow the progression of the disease    Improve length and quality of life    Know who and when to call if heart failure symptoms appear    Receive easy access to quality health care and advice    Better understand  your condition and treatment    Decrease the tremendous cost burden of heart failure care    Detect future heart problems before they become life threatening    Your C.O.R.E. Clinic Team will continue to educate you on your heart failure and may adjust medications based on your vital signs, lab work, and how you are feeling.  Therefore, it is very important to bring the following to all C.O.R.E. appointments:    - An accurate list of your medications  - Your medicine bottles  - Your weight chart/calendar    [facility]):    [phone]  [facility]):   [phone]  [facility] ([address]): [phone]

## 2020-05-06 NOTE — PROGRESS NOTES
[facility] Cardiology Progress Note          Assessment and Plan:   1. Severe LV systolic dysfunction (EF 20-25%), likely due to stress-induced cardiomyopathy  2. HFrEF  -toprol xl  -soft BPs limit ability to uptitrate  3. Sinus tachycardia, compensatory   -HR only modestly better, 120s on toprol XL 12.5, will increase to 25   4. Electrolyte derangement-resolved  5. Nausea and vomiting-resolved  6. Hypoxic respiratory failure, resolved    -- She is clinically improving. Respiratory status has improved significantly improved overnight.     -Still tachycardic with rates up to 120s while asymptomatic. Continues to have asymptomatic soft blood  Pressures.    -- Will initiate cardiac meds for HF/LV dysfunction.  Started toprol XL 12.5 mg, increase to 25     Follow  Up arranged next week with Buffy Roldan and OV with Dr. Martin in 3 months.        Interval History:   Resting comfortably in bed on RA, still with a sinus tachy         Review of Systems:   POSITIVE FOR DYSPNEA       Medications:     No current outpatient medications on file.               Physical Exam:   All vitals have been reviewed  Patient Vitals for the past 24 hrs:   BP Temp Temp src Pulse Heart Rate Resp SpO2 Weight   05/06/20 0728 96/65 98.6  F (37  C) Oral -- 121 16 98 % --   05/06/20 0628 -- -- -- -- -- -- -- 55 kg (121 lb 4.8 oz)   05/06/20 0032 102/64 98.7  F (37.1  C) Oral -- 120 -- 99 % --   05/05/20 2017 98/65 98.8  F (37.1  C) Oral -- 122 16 98 % --   05/05/20 1534 97/68 99.3  F (37.4  C) Oral -- 124 16 98 % --   05/05/20 1510 92/65 -- -- 111 -- -- -- --   05/05/20 1505 (!) 88/63 -- -- 132 -- -- -- --   05/05/20 1250 105/82 -- -- 135 -- -- -- --   05/05/20 1025 100/76 -- -- -- 168 -- 95 % --       Intake/Output Summary (Last 24 hours) at 5/4/2020 1255  Last data filed at 5/4/2020 1100  Gross per 24 hour   Intake 2972.96 ml   Output 2575 ml   Net 397.96 ml     Gen: Comfortable on RA  Neck: JVP not elevated  Lung: diminished  throughout  CV: Tachycardic  Ext: no edema          Data:   All laboratory data reviewed  ROUTINE IP LABS (Last four results)  BMP  Recent Labs   Lab 05/06/20  0622 05/04/20  0535 05/04/20  0000 05/03/20  1810    134 132* 132*   POTASSIUM 3.1* 3.8 3.2* 3.4   CHLORIDE 108 102 99 101   DONOVAN 8.7 7.8* 8.0* 7.8*   CO2 28 24 23 22   BUN 2* 3* 4* 5*   CR 0.60 0.62 0.56 0.50*   GLC 89 138* 125* 148*     CBC  Recent Labs   Lab 05/05/20  0540 05/04/20  0506 05/04/20  0000 05/03/20  1810   WBC 5.4 12.2* 10.4 3.3*   RBC 3.83 4.00 4.44 4.21   HGB 12.7 13.3 14.9 13.9   HCT 36.6 38.0 41.8 39.7   MCV 96 95 94 94   MCH 33.2* 33.3* 33.6* 33.0   MCHC 34.7 35.0 35.6 35.0   RDW 12.2 12.2 12.2 12.2    186 205 203     INR  Recent Labs   Lab 05/03/20  0145   INR 1.26*

## 2020-05-06 NOTE — PROGRESS NOTES
Summary:     DATE & TIME: 5/6/20 6 am  Cognitive Concerns/ Orientation : A/Ox4   BEHAVIOR & AGGRESSION TOOL COLOR: Green  CIWA SCORE: yes  ABNL VS/O2: VSS  MOBILITY: IND  PAIN MANAGMENT: Denies CP  DIET: Full liquid  BOWEL/BLADDER: Voiding in BR  ABNL LAB/BG:   DRAIN/DEVICES: PIV for IV abx  TELEMETRY RHYTHM: Sinus tachy  SKIN: WNL  TESTS/PROCEDURES: Pending order for BMP drawn this am. Was ordered for 5/5.  D/C DAY/GOALS/PLACE: possible discharge today  OTHER IMPORTANT INFO: speech evaluation pending, CIWA protocol and seizure precaution.

## 2020-05-06 NOTE — DISCHARGE SUMMARY
[facility]  Hospitalist Discharge Summary      Date of Admission:  5/3/2020  Date of Discharge:  5/6/2020  Discharging Provider: Lucas Moreno MD      Discharge Diagnoses     Acute Hypoxic Respiratory Failure  Hematemesis     Follow-ups Needed After Discharge   Follow-up Appointments     Follow-up and recommended labs and tests       Follow up with primary care provider, Physician No Ref-Primary, within 7   days for hospital follow- up.  The following labs/tests are recommended:   None.           Unresulted Labs Ordered in the Past 30 Days of this Admission     Date and Time Order Name Status Description    5/3/2020 0737 Blood culture Preliminary     5/3/2020 0737 Blood culture Preliminary         Discharge Disposition   Discharged to home  Condition at discharge: Stable    Hospital Course   Darya Hamilton is a 24 year old female admitted on 5/3/2020. She presents the emergency department for evaluation of ongoing emesis, report of hematemesis at home     Sinus Tachycardic  HFrEF   Assessment: Suspect primarily secondary to hypovolemia, though possible also confusion from alcohol withdrawal.  Supraventricular tachycardia, symptomatic ventricular tachycardia: Patient with self-limited episodes of SVT as well as 1 minute episode of ventricular tachycardia in the emergency department.  Became increasingly lightheaded with ventricular tachycardia despite reduction in rate from 180 range to 150 range. Suspect dysrhythmias associated with significant acidosis. ECHO showed Severe LV systolic dysfunction (EF 20-25%), likely due to stress-induced cardiomyopathy  Plan:  - BB at discharge  - blood pressures too soft for ACE-i    Lactic acidosis, ketoacidosis  Aspiration PNA?  Acute Hypoxic Respiratory Failure  Assessment:  Suspect ketoacidosis secondary to starvation ketoacidosis and lactic acidosis related to hypovolemia. O2 needs now declining, resp status stable and improving. COVID-10 PCR was negative.    Plan:  - Augmentin short course at discharge.     Nausea and vomiting  Assessment: Patient reports no prior history of alcohol withdrawal, though note prior hepatic enzyme elevations, currently with 2-1 AST to ALT ratio and psychomotor agitation, reports hallucinosis at home with seeing letters on the ceiling. Symptoms resolved with CIWA and supportive cares.  Plan:  -X-ray esophagram when patient's nausea and vomiting has improved/resolved and COVID ruled out based on possible distal esophageal wall thickening on CT.     Hematemesis - Resolved  By history, most consistent with Kayla-Edwards tear.  Patient with bilious emesis in the emergency department without evidence of blood. Hgb stable  Plan:  - PO protoxin daily  - Alcohol cessation     Hyperkalemia - resolved  Assessment: Suspect secondary to profound acidosis.  Has normalized with correction of acidosis and initiation of insulin therapy .     Seizure disorder: Patient with a history of seizure disorder on Keppra 500 mg twice daily.  No event with seizure-like activity described prior to admission.  Plan:  -Keppra cotinued     Moderate to severe protein calorie malnutrition: Difficult to assess degree of malnutrition given patient reports no oral intake for the past week.  Note that when she moved from [place] to [place] in 2017 and was hospitalized for cholecystectomy for possible symptomatic cholelithiasis at [facility], she reported a 20 pound weight loss at that time    History of empyema versus hemothorax  Assessment:   Patient with scars on her left flank consistent with chest tube placement and possible VATS.  Reports that she was hospitalized at [facility] in [place] for 1 month, required blood transfusion during that hospitalization though cannot provide further details.  When asked about the year that this occurred she initially tells me it was between the years 2020 and 2022.  When reminded that it is currently the  year 2020, she tells me that it was in [id number].  This portion of her history is where her encephalopathy is most notable.  By review of outside records, it appears that this might have been in 2016 prior to her moving to [place] in 2017.      History of Hodgkin's lymphoma status post chemotherapy       Consultations This Hospital Stay   OCCUPATIONAL THERAPY ADULT IP CONSULT  CARDIOLOGY IP CONSULT  INTENSIVIST IP CONSULT  VASCULAR ACCESS ADULT IP CONSULT  SPEECH LANGUAGE PATH ADULT IP CONSULT    Code Status   Full Code    Time Spent on this Encounter   I, Lucas Moreno MD, personally saw the patient today and spent greater than 30 minutes discharging this patient.       Lucas Moreno MD  [facility]  ______________________________________________________________________    Physical Exam   Vital Signs: Temp: 98.4  F (36.9  C) Temp src: Oral BP: (!) 88/65 Pulse: 112 Heart Rate: 115 Resp: 16 SpO2: 98 % O2 Device: None (Room air)    Weight: 121 lbs 4.8 oz       Primary Care Physician   Physician No Ref-Primary    Discharge Orders      Reason for your hospital stay    You had elevated heart rate, vomiting with blood, needed Cardiology evaluation.     Follow-up and recommended labs and tests     Follow up with primary care provider, Physician No Ref-Primary, within 7 days for hospital follow- up.  The following labs/tests are recommended: None.     Activity    Your activity upon discharge: activity as tolerated     Echocardiogram Complete    Administration of IV contrast will be tailored to this examination per the appropriate written protocol listed in the Echocardiography department Protocol Book, or by the supervising Cardiologist. This may result in an order change.    Use of contrast is at the discretion of the supervising Cardiologist.     Diet    Follow this diet upon discharge: Orders Placed This Encounter      Combination Diet 2 gm NA Diet       Significant Results and Procedures   Most Recent 3  CBC's:  Recent Labs   Lab Test 05/05/20  0540 05/04/20  0506 05/04/20  0000   WBC 5.4 12.2* 10.4   HGB 12.7 13.3 14.9   MCV 96 95 94    186 205     Most Recent 3 BMP's:  Recent Labs   Lab Test 05/06/20  0622 05/04/20  0535 05/04/20  0000    134 132*   POTASSIUM 3.1* 3.8 3.2*   CHLORIDE 108 102 99   CO2 28 24 23   BUN 2* 3* 4*   CR 0.60 0.62 0.56   ANIONGAP 4 8 10   DONOVAN 8.7 7.8* 8.0*   GLC 89 138* 125*     Most Recent 2 LFT's:  Recent Labs   Lab Test 05/04/20  0535 05/03/20  0145   AST 52* 68*   ALT 28 46   ALKPHOS 50 85   BILITOTAL 1.0 1.0     Most Recent 3 INR's:  Recent Labs   Lab Test 05/03/20  0145   INR 1.26*     Most Recent 3 Troponin's:  Recent Labs   Lab Test 05/04/20  0535 05/03/20  1600 05/03/20  1213   TROPI 0.445* 0.746* 0.498*     Most Recent 3 BNP's:No lab results found.,   Results for orders placed or performed during the hospital encounter of 05/03/20   CT Chest/Abdomen/Pelvis w Contrast    Narrative    EXAM: CT CHEST/ABDOMEN/PELVIS W CONTRAST  LOCATION: [facility]  DATE/TIME: 5/3/2020 4:01 AM    INDICATION: Chest and abdominal pain. Vomiting.  COMPARISON: None.  TECHNIQUE: CT scan of the chest, abdomen, and pelvis was performed before and after injection of IV contrast. Multiplanar reformats were obtained. Dose reduction techniques were used.  CONTRAST: 65mL Isovue-370    FINDINGS:   LUNGS AND PLEURA: No infiltrate or pleural effusion.    MEDIASTINUM/AXILLAE: No adenopathy or pericardial effusion. Distal esophageal wall thickening not excluded.    HEPATOBILIARY: Hepatic steatosis. Cholecystectomy.    PANCREAS: Normal.    SPLEEN: Normal.    ADRENAL GLANDS: Normal.    KIDNEYS/BLADDER: Normal.    BOWEL: Normal caliber. Underdistention of colon reduces evaluation for wall thickening/colitis.    LYMPH NODES: Normal.    VASCULATURE: Unremarkable.    PELVIC ORGANS: Trace amount of free fluid.    MUSCULOSKELETAL: Within normal limits.      Impression    IMPRESSION:  1.  No  infiltrate or pleural effusion.  2.  No bowel obstruction or abscess.  3.  Distal esophageal wall thickening not excluded. Further characterization is indicated consider follow-up esophagram.  4.  Underdistention of colon reduces evaluation for colitis.   XR Chest Port 1 View    Narrative    CHEST ONE VIEW PORTABLE   5/3/2020 1:43 PM     HISTORY:  Hypoxemia.    COMPARISON: 6/15/2018.      Impression    IMPRESSION: Hazy opacity in both lower lungs is new since the previous  exam, and may be related to infection or edema. No pneumothorax.  Pulmonary vascularity is within normal limits.    JANNIE RODRIGUEZ MD   XR Chest Port 1 View    Narrative    CHEST ONE VIEW  5/4/2020 10:52 AM     HISTORY: Evaluate for aspiration pneumonia.. Hematemesis, cough    COMPARISON: 5/3/2020      Impression    IMPRESSION: A right PICC is present. The tip overlies the lower SVC.  Left basilar opacities have increased and are concerning for airspace  disease. There may be trace left pleural fluid. No pneumothorax. No  interstitial edema. The heart is normal in size.    LESTER J FAHRNER, MD   Cardioversion External    Narrative    Fredis Rodney MD     5/3/2020  2:36 PM  [facility]    Procedure: Cardioversion External    Date/Time: 5/3/2020 2:33 PM  Performed by: Fredis Rodney MD  Authorized by: Fredis Rodney MD     UNIVERSAL PROTOCOL   Site Marked: NA  Prior Images Obtained and Reviewed:  NA  Required items: Required blood products, implants, devices and special   equipment available    Patient identity confirmed:  Arm band and verbally with patient  NA - No sedation, light sedation, or local anesthesia  Confirmation Checklist:  Patient's identity using two indicators, correct   equipment/implants were available, relevant allergies and procedure was   appropriate and matched the consent or emergent situation  Time out: Immediately prior to the procedure a time out was called    Universal Protocol: the Joint  Commission Universal Protocol was followed          SEDATION    Patient Sedated: No      PROCEDURE DETAILS  Cardioversion basis: emergent  Pre-procedure rhythm: supraventricular tachycardia  Patient position: patient was placed in a supine position  Chest area: chest area exposed  Electrodes: pads  Electrodes placed: anterior-lateral  Number of attempts: 2    Details of Attempts:  Patient with svt on monitor.  Attempted chemical   cardioversion x2 with 6 followed by 12 mg of adenosine.  Lifepack on at   all times, ekg rhythm strip running.  Although patient's rate did decrease   to ~100 with adenosine, it did not appear to convert to sinus and she   immediately returned to a rate of 160.  Post-procedure rhythm: supraventricular tachycardia  Complications: no complications    PROCEDURE   Patient Tolerance:  Patient tolerated the procedure well with no immediate   complications    Length of time physician/provider present for 1:1 monitoring during   sedation: 0   Echocardiogram Complete    Narrative    [id number]  [id number]  [id number]  [id number]^CHALO^YUNG^CHAVA           [facility]  Echocardiography Laboratory  [address] [place] [address]        Name: JOHN COOLEY  MRN: [id number]  : 1995  Study Date: 2020 02:01 PM  Age: 24 yrs  Gender: Female  Patient Location: [facility]  Reason For Study: Tachycardia  Ordering Physician: YUNG ISABEL  Referring Physician: YUNG ISABEL  Performed By: Willem Rodriges RDCS     BSA: 1.6 m2  Height: 63 in  Weight: 122 lb  HR: 157  BP: 117/94 mmHg  _____________________________________________________________________________  __        Procedure  Complete Portable Echo Adult. Optison (NDC #[id number]) given intravenously.  _____________________________________________________________________________  __        Interpretation Summary     Rhythm appears to be sinus tachycardia during study.  1. Left ventricular systolic function is severely  reduced. The visual ejection  fraction is estimated at 20-25%.  2. Severe global hyopkinesis with hyperdyanic function at the bases.  Appearance consistent with stress cardiomyopathy.  3. There is mild (1+) mitral regurgitation.  4. No prior studies available for comparison.  _____________________________________________________________________________  __        Left Ventricle  The left ventricle is normal in size. There is normal left ventricular wall  thickness. The visual ejection fraction is estimated at 20-25%. Left  ventricular systolic function is severely reduced. Left ventricular diastolic  function is indeterminate. Severe global hyopkinesis with hyperdyanic function  at the bases. Appearance consistent with stress cardiomyopathy.     Right Ventricle  The right ventricle is normal in structure, function and size.     Atria  Normal left atrial size. Right atrial size is normal. There is no color  Doppler evidence of an atrial shunt.     Mitral Valve  The mitral valve is normal in structure and function. There is mild (1+)  mitral regurgitation.        Tricuspid Valve  The tricuspid valve is normal in structure and function. There is trace  tricuspid regurgitation.     Aortic Valve  The aortic valve is normal in structure and function.     Pulmonic Valve  The pulmonic valve is normal in structure and function.     Vessels  Normal size aorta. IVC diameter <2.1 cm collapsing >50% with sniff suggests a  normal RA pressure of 3 mmHg.     Pericardium  There is no pericardial effusion.        Rhythm  The rhythm was sinus tachycardia.  _____________________________________________________________________________  __  MMode/2D Measurements & Calculations  IVSd: 0.69 cm     LVIDd: 5.2 cm  LVIDs: 3.7 cm  LVPWd: 0.65 cm  FS: 28.6 %  LV mass(C)d: 115.0 grams  LV mass(C)dI: 73.3 grams/m2  Ao root diam: 2.8 cm  LA dimension: 2.8 cm  asc Aorta Diam: 2.5 cm  LA/Ao: 0.99  LA Volume (BP): 28.3 ml  LA Volume Index (BP): 18.0  ml/m2  RWT: 0.25           Doppler Measurements & Calculations  PA acc time: 0.08 sec  TR max bakari: 306.2 cm/sec  TR max P.5 mmHg           _____________________________________________________________________________  __           Report approved by: Charity Negron 2020 03:23 PM            Discharge Medications   Current Discharge Medication List      START taking these medications    Details   amoxicillin-clavulanate (AUGMENTIN) 875-125 MG tablet Take 1 tablet by mouth 2 times daily for 4 days  Qty: 8 tablet, Refills: 0    Associated Diagnoses: Cardiomyopathy due metabolic or nutritional disease (H)      metoprolol succinate ER (TOPROL-XL) 25 MG 24 hr tablet Take 1 tablet (25 mg) by mouth daily  Qty: 60 tablet, Refills: 0    Associated Diagnoses: Cardiomyopathy due metabolic or nutritional disease (H)      pantoprazole (PROTONIX) 40 MG EC tablet Take 1 tablet (40 mg) by mouth daily  Qty: 30 tablet, Refills: 0    Associated Diagnoses: Alcohol use      vitamin B1 (THIAMINE) 100 MG tablet Take 1 tablet (100 mg) by mouth daily for 7 days  Qty: 7 tablet, Refills: 0    Associated Diagnoses: Cardiomyopathy due metabolic or nutritional disease (H)         CONTINUE these medications which have NOT CHANGED    Details   acetaminophen (TYLENOL) 325 MG tablet Take 325-650 mg by mouth every 6 hours as needed for mild pain      levETIRAcetam (KEPPRA) 500 MG tablet Take 1,000 mg by mouth 2 times daily       ondansetron (ZOFRAN ODT) 4 MG ODT tab Take 1 tablet (4 mg) by mouth every 8 hours as needed for nausea  Qty: 10 tablet, Refills: 0           Allergies   Allergies   Allergen Reactions     Asa [Aspirin] Angioedema and Hives     Hydrocodone      Hallucinations

## 2020-05-07 ENCOUNTER — TELEPHONE (OUTPATIENT)
Dept: CARDIOLOGY | Facility: CLINIC | Age: 25
End: 2020-05-07

## 2020-05-07 NOTE — TELEPHONE ENCOUNTER
Patient was evaluated by cardiology while inpatient for N/V with large amount of hematemesis (probable Kayla Edwards tear), poor po intake x one week, SOB-respiratory distress, metabolic acidosis, hyperkalemia and WCT mixed with SVT rates 160's. PMH: Seizure disorder, ETOH misuse, Hodgkin's Lymphoma. Failed attempt at chemical conversion with Adenosine x 2 in ED, although slowed rate intermittently. Rhythm appears to be AT mixed with intermittent LBBB aberrancy. Improved with correction of symptoms and electrolytes and use of BiPap. Echocardiogram (05/03/2020): Severe LV dysfunction. EF estimated at 20-25%. Severe global hypokinesis with hyperdynamic function at the bases-probable stress cardiomyopathy. COVID19 negative. Started on Metoprolol and Protonix. Writer attempted to call patient to discuss any post hospital d/c questions, review discharge medication, and confirm f/u appts, but no answer and VM box is full. Will try back at a later date. RN will confirm with patient that she has a video visit appt scheduled with RUBEN Ariella Genao on 5/15/20 at 1000 via video visit. Will remind patient to weigh self every AM, after waking and using the restroom, but before breakfast and medications. Call clinic for a weight gain of 2 lbs overnight or 5 lbs in a week. Low Na+ diet to be encouraged. Pt will be instructed to have daily wt diary and medications readily available for this video visit. FATUMA Chandler RN.

## 2020-05-08 NOTE — TELEPHONE ENCOUNTER
Second attempt at trying to contact pt, and again, no answer and VM box remains full. Will try back on last time at a later date. FATUMA Chandler RN.

## 2020-05-09 LAB
BACTERIA SPEC CULT: NO GROWTH
BACTERIA SPEC CULT: NO GROWTH
Lab: NORMAL
Lab: NORMAL
SPECIMEN SOURCE: NORMAL
SPECIMEN SOURCE: NORMAL

## 2020-05-11 NOTE — TELEPHONE ENCOUNTER
Third attempt at trying to contact pt. Pt answered and then hung up on writer. Will close this encounter. FATUMA Chandler RN.

## 2020-07-17 ENCOUNTER — TELEPHONE (OUTPATIENT)
Dept: CARDIOLOGY | Facility: CLINIC | Age: 25
End: 2020-07-17

## 2020-07-23 ENCOUNTER — TELEPHONE (OUTPATIENT)
Dept: CARDIOLOGY | Facility: CLINIC | Age: 25
End: 2020-07-23

## 2020-10-07 ENCOUNTER — HOSPITAL ENCOUNTER (INPATIENT)
Facility: CLINIC | Age: 25
LOS: 2 days | Discharge: HOME | DRG: 922 | End: 2020-10-10
Attending: EMERGENCY MEDICINE | Admitting: INTERNAL MEDICINE

## 2020-10-07 DIAGNOSIS — K92.0 HEMATEMESIS WITH NAUSEA: ICD-10-CM

## 2020-10-07 DIAGNOSIS — E87.29 HIGH ANION GAP METABOLIC ACIDOSIS: ICD-10-CM

## 2020-10-07 DIAGNOSIS — G47.00 INSOMNIA, UNSPECIFIED TYPE: Primary | ICD-10-CM

## 2020-10-07 DIAGNOSIS — E83.39 HYPOPHOSPHATEMIA: ICD-10-CM

## 2020-10-07 DIAGNOSIS — E87.29 KETOACIDOSIS: ICD-10-CM

## 2020-10-07 DIAGNOSIS — Z78.9 ALCOHOL USE: ICD-10-CM

## 2020-10-07 DIAGNOSIS — E87.20 LACTIC ACIDOSIS: ICD-10-CM

## 2020-10-07 DIAGNOSIS — R00.0 SINUS TACHYCARDIA: ICD-10-CM

## 2020-10-07 LAB
ALBUMIN SERPL-MCNC: 5.3 G/DL (ref 3.4–5)
ALP SERPL-CCNC: 88 U/L (ref 40–150)
ALT SERPL W P-5'-P-CCNC: 114 U/L (ref 0–50)
ANION GAP SERPL CALCULATED.3IONS-SCNC: 25 MMOL/L (ref 3–14)
AST SERPL W P-5'-P-CCNC: 185 U/L (ref 0–45)
BASE DEFICIT BLDV-SCNC: 17.1 MMOL/L
BASOPHILS # BLD AUTO: 0.1 10E9/L (ref 0–0.2)
BASOPHILS NFR BLD AUTO: 2.1 %
BILIRUB SERPL-MCNC: 1 MG/DL (ref 0.2–1.3)
BUN SERPL-MCNC: 10 MG/DL (ref 7–30)
CALCIUM SERPL-MCNC: 9 MG/DL (ref 8.5–10.1)
CHLORIDE SERPL-SCNC: 97 MMOL/L (ref 94–109)
CO2 SERPL-SCNC: 10 MMOL/L (ref 20–32)
CREAT SERPL-MCNC: 0.65 MG/DL (ref 0.52–1.04)
DIFFERENTIAL METHOD BLD: ABNORMAL
EOSINOPHIL # BLD AUTO: 0 10E9/L (ref 0–0.7)
EOSINOPHIL NFR BLD AUTO: 0 %
ERYTHROCYTE [DISTWIDTH] IN BLOOD BY AUTOMATED COUNT: 12.6 % (ref 10–15)
GFR SERPL CREATININE-BSD FRML MDRD: >90 ML/MIN/{1.73_M2}
GLUCOSE SERPL-MCNC: 96 MG/DL (ref 70–99)
HCG SERPL QL: NEGATIVE
HCO3 BLDV-SCNC: 10 MMOL/L (ref 21–28)
HCT VFR BLD AUTO: 46 % (ref 35–47)
HGB BLD-MCNC: 16 G/DL (ref 11.7–15.7)
IMM GRANULOCYTES # BLD: 0 10E9/L (ref 0–0.4)
IMM GRANULOCYTES NFR BLD: 0.3 %
INTERPRETATION ECG - MUSE: NORMAL
INTERPRETATION ECG - MUSE: NORMAL
LACTATE BLD-SCNC: 6 MMOL/L (ref 0.7–2)
LIPASE SERPL-CCNC: 213 U/L (ref 73–393)
LYMPHOCYTES # BLD AUTO: 1.2 10E9/L (ref 0.8–5.3)
LYMPHOCYTES NFR BLD AUTO: 36.7 %
MCH RBC QN AUTO: 33.8 PG (ref 26.5–33)
MCHC RBC AUTO-ENTMCNC: 34.8 G/DL (ref 31.5–36.5)
MCV RBC AUTO: 97 FL (ref 78–100)
MONOCYTES # BLD AUTO: 0.3 10E9/L (ref 0–1.3)
MONOCYTES NFR BLD AUTO: 7.8 %
NEUTROPHILS # BLD AUTO: 1.8 10E9/L (ref 1.6–8.3)
NEUTROPHILS NFR BLD AUTO: 53.1 %
NRBC # BLD AUTO: 0 10*3/UL
NRBC BLD AUTO-RTO: 0 /100
NT-PROBNP SERPL-MCNC: 16 PG/ML (ref 0–450)
O2/TOTAL GAS SETTING VFR VENT: ABNORMAL %
OXYHGB MFR BLDV: 67 %
PCO2 BLDV: 29 MM HG (ref 40–50)
PH BLDV: 7.16 PH (ref 7.32–7.43)
PLATELET # BLD AUTO: 306 10E9/L (ref 150–450)
PO2 BLDV: 43 MM HG (ref 25–47)
POTASSIUM SERPL-SCNC: 3.6 MMOL/L (ref 3.4–5.3)
PROT SERPL-MCNC: 10.1 G/DL (ref 6.8–8.8)
RBC # BLD AUTO: 4.74 10E12/L (ref 3.8–5.2)
SODIUM SERPL-SCNC: 132 MMOL/L (ref 133–144)
TROPONIN I SERPL-MCNC: <0.015 UG/L (ref 0–0.04)
WBC # BLD AUTO: 3.4 10E9/L (ref 4–11)

## 2020-10-07 PROCEDURE — 250N000011 HC RX IP 250 OP 636: Performed by: EMERGENCY MEDICINE

## 2020-10-07 PROCEDURE — 96361 HYDRATE IV INFUSION ADD-ON: CPT

## 2020-10-07 PROCEDURE — 93005 ELECTROCARDIOGRAM TRACING: CPT

## 2020-10-07 PROCEDURE — 99285 EMERGENCY DEPT VISIT HI MDM: CPT | Mod: 25

## 2020-10-07 PROCEDURE — 84703 CHORIONIC GONADOTROPIN ASSAY: CPT | Performed by: EMERGENCY MEDICINE

## 2020-10-07 PROCEDURE — 84484 ASSAY OF TROPONIN QUANT: CPT | Performed by: EMERGENCY MEDICINE

## 2020-10-07 PROCEDURE — 85025 COMPLETE CBC W/AUTO DIFF WBC: CPT | Performed by: EMERGENCY MEDICINE

## 2020-10-07 PROCEDURE — 96376 TX/PRO/DX INJ SAME DRUG ADON: CPT

## 2020-10-07 PROCEDURE — 80320 DRUG SCREEN QUANTALCOHOLS: CPT | Performed by: EMERGENCY MEDICINE

## 2020-10-07 PROCEDURE — 83605 ASSAY OF LACTIC ACID: CPT | Performed by: EMERGENCY MEDICINE

## 2020-10-07 PROCEDURE — 87040 BLOOD CULTURE FOR BACTERIA: CPT | Performed by: EMERGENCY MEDICINE

## 2020-10-07 PROCEDURE — 258N000003 HC RX IP 258 OP 636: Performed by: EMERGENCY MEDICINE

## 2020-10-07 PROCEDURE — 80329 ANALGESICS NON-OPIOID 1 OR 2: CPT | Performed by: EMERGENCY MEDICINE

## 2020-10-07 PROCEDURE — 82805 BLOOD GASES W/O2 SATURATION: CPT | Performed by: EMERGENCY MEDICINE

## 2020-10-07 PROCEDURE — 83735 ASSAY OF MAGNESIUM: CPT | Performed by: EMERGENCY MEDICINE

## 2020-10-07 PROCEDURE — 83690 ASSAY OF LIPASE: CPT | Performed by: EMERGENCY MEDICINE

## 2020-10-07 PROCEDURE — 96375 TX/PRO/DX INJ NEW DRUG ADDON: CPT

## 2020-10-07 PROCEDURE — 80053 COMPREHEN METABOLIC PANEL: CPT | Performed by: EMERGENCY MEDICINE

## 2020-10-07 PROCEDURE — C9803 HOPD COVID-19 SPEC COLLECT: HCPCS

## 2020-10-07 PROCEDURE — 83880 ASSAY OF NATRIURETIC PEPTIDE: CPT | Performed by: EMERGENCY MEDICINE

## 2020-10-07 RX ORDER — METOCLOPRAMIDE HYDROCHLORIDE 5 MG/ML
10 INJECTION INTRAMUSCULAR; INTRAVENOUS ONCE
Status: COMPLETED | OUTPATIENT
Start: 2020-10-07 | End: 2020-10-07

## 2020-10-07 RX ORDER — LORAZEPAM 2 MG/ML
1 INJECTION INTRAMUSCULAR ONCE
Status: COMPLETED | OUTPATIENT
Start: 2020-10-07 | End: 2020-10-07

## 2020-10-07 RX ORDER — ONDANSETRON 2 MG/ML
4 INJECTION INTRAMUSCULAR; INTRAVENOUS EVERY 30 MIN PRN
Status: COMPLETED | OUTPATIENT
Start: 2020-10-07 | End: 2020-10-08

## 2020-10-07 RX ADMIN — METOCLOPRAMIDE 10 MG: 5 INJECTION, SOLUTION INTRAMUSCULAR; INTRAVENOUS at 23:26

## 2020-10-07 RX ADMIN — SODIUM CHLORIDE 1000 ML: 9 INJECTION, SOLUTION INTRAVENOUS at 22:37

## 2020-10-07 RX ADMIN — ONDANSETRON 4 MG: 2 INJECTION INTRAMUSCULAR; INTRAVENOUS at 22:36

## 2020-10-07 RX ADMIN — ONDANSETRON 4 MG: 2 INJECTION INTRAMUSCULAR; INTRAVENOUS at 23:02

## 2020-10-07 RX ADMIN — LORAZEPAM 1 MG: 2 INJECTION INTRAMUSCULAR; INTRAVENOUS at 23:43

## 2020-10-07 ASSESSMENT — ENCOUNTER SYMPTOMS
DIARRHEA: 0
FEVER: 0
VOMITING: 1
NAUSEA: 1
DYSURIA: 0
SHORTNESS OF BREATH: 1
HEADACHES: 0
HEMATURIA: 0
CONSTIPATION: 0
COUGH: 1
ABDOMINAL PAIN: 1

## 2020-10-08 ENCOUNTER — APPOINTMENT (OUTPATIENT)
Dept: CARDIOLOGY | Facility: CLINIC | Age: 25
DRG: 922 | End: 2020-10-08
Attending: NURSE PRACTITIONER

## 2020-10-08 ENCOUNTER — APPOINTMENT (OUTPATIENT)
Dept: CT IMAGING | Facility: CLINIC | Age: 25
DRG: 922 | End: 2020-10-08
Attending: EMERGENCY MEDICINE

## 2020-10-08 PROBLEM — R00.0 SINUS TACHYCARDIA: Status: ACTIVE | Noted: 2020-10-08

## 2020-10-08 PROBLEM — K92.0 HEMATEMESIS WITH NAUSEA: Status: ACTIVE | Noted: 2020-10-08

## 2020-10-08 PROBLEM — E87.29 HIGH ANION GAP METABOLIC ACIDOSIS: Status: ACTIVE | Noted: 2020-10-08

## 2020-10-08 PROBLEM — E87.20 LACTIC ACIDOSIS: Status: ACTIVE | Noted: 2020-10-08

## 2020-10-08 PROBLEM — Z78.9 ALCOHOL USE: Status: ACTIVE | Noted: 2020-10-08

## 2020-10-08 LAB
ALBUMIN SERPL-MCNC: 4.7 G/DL (ref 3.4–5)
ALBUMIN UR-MCNC: 100 MG/DL
ALP SERPL-CCNC: 69 U/L (ref 40–150)
ALT SERPL W P-5'-P-CCNC: 97 U/L (ref 0–50)
ANION GAP SERPL CALCULATED.3IONS-SCNC: 11 MMOL/L (ref 3–14)
ANION GAP SERPL CALCULATED.3IONS-SCNC: 17 MMOL/L (ref 3–14)
ANION GAP SERPL CALCULATED.3IONS-SCNC: 24 MMOL/L (ref 3–14)
APAP SERPL-MCNC: <2 MG/L (ref 10–20)
APPEARANCE UR: ABNORMAL
AST SERPL W P-5'-P-CCNC: 150 U/L (ref 0–45)
BASE DEFICIT BLDA-SCNC: 18.5 MMOL/L
BASE DEFICIT BLDV-SCNC: 10.8 MMOL/L
BASE DEFICIT BLDV-SCNC: 19.5 MMOL/L
BASE DEFICIT BLDV-SCNC: 22.1 MMOL/L
BILIRUB SERPL-MCNC: 0.9 MG/DL (ref 0.2–1.3)
BILIRUB UR QL STRIP: NEGATIVE
BUN SERPL-MCNC: 4 MG/DL (ref 7–30)
BUN SERPL-MCNC: 4 MG/DL (ref 7–30)
BUN SERPL-MCNC: 8 MG/DL (ref 7–30)
CALCIUM SERPL-MCNC: 7.4 MG/DL (ref 8.5–10.1)
CALCIUM SERPL-MCNC: 7.6 MG/DL (ref 8.5–10.1)
CALCIUM SERPL-MCNC: 8 MG/DL (ref 8.5–10.1)
CHLORIDE SERPL-SCNC: 104 MMOL/L (ref 94–109)
CHLORIDE SERPL-SCNC: 104 MMOL/L (ref 94–109)
CHLORIDE SERPL-SCNC: 108 MMOL/L (ref 94–109)
CO2 SERPL-SCNC: 10 MMOL/L (ref 20–32)
CO2 SERPL-SCNC: 16 MMOL/L (ref 20–32)
CO2 SERPL-SCNC: 6 MMOL/L (ref 20–32)
COLOR UR AUTO: YELLOW
CREAT SERPL-MCNC: 0.52 MG/DL (ref 0.52–1.04)
CREAT SERPL-MCNC: 0.53 MG/DL (ref 0.52–1.04)
CREAT SERPL-MCNC: 0.61 MG/DL (ref 0.52–1.04)
ERYTHROCYTE [DISTWIDTH] IN BLOOD BY AUTOMATED COUNT: 12.6 % (ref 10–15)
ETHANOL SERPL-MCNC: 0.11 G/DL
GFR SERPL CREATININE-BSD FRML MDRD: >90 ML/MIN/{1.73_M2}
GLUCOSE BLDC GLUCOMTR-MCNC: 158 MG/DL (ref 70–99)
GLUCOSE BLDC GLUCOMTR-MCNC: 216 MG/DL (ref 70–99)
GLUCOSE SERPL-MCNC: 176 MG/DL (ref 70–99)
GLUCOSE SERPL-MCNC: 219 MG/DL (ref 70–99)
GLUCOSE SERPL-MCNC: 93 MG/DL (ref 70–99)
GLUCOSE UR STRIP-MCNC: NEGATIVE MG/DL
HBA1C MFR BLD: 4.5 % (ref 0–5.6)
HCO3 BLD-SCNC: 10 MMOL/L (ref 21–28)
HCO3 BLDV-SCNC: 14 MMOL/L (ref 21–28)
HCO3 BLDV-SCNC: 6 MMOL/L (ref 21–28)
HCO3 BLDV-SCNC: 8 MMOL/L (ref 21–28)
HCT VFR BLD AUTO: 40.1 % (ref 35–47)
HGB BLD-MCNC: 13.4 G/DL (ref 11.7–15.7)
HGB UR QL STRIP: ABNORMAL
INTERPRETATION ECG - MUSE: NORMAL
KETONES BLD-SCNC: 2.9 MMOL/L (ref 0–0.6)
KETONES BLD-SCNC: 6.4 MMOL/L (ref 0–0.6)
KETONES UR STRIP-MCNC: >150 MG/DL
LABORATORY COMMENT REPORT: NORMAL
LACTATE BLD-SCNC: 1.6 MMOL/L (ref 0.7–2)
LACTATE BLD-SCNC: 2.3 MMOL/L (ref 0.7–2)
LACTATE BLD-SCNC: 2.4 MMOL/L (ref 0.7–2)
LACTATE BLD-SCNC: 2.7 MMOL/L (ref 0.7–2)
LACTATE BLD-SCNC: 6.1 MMOL/L (ref 0.7–2)
LEUKOCYTE ESTERASE UR QL STRIP: NEGATIVE
MAGNESIUM SERPL-MCNC: 1.9 MG/DL (ref 1.6–2.3)
MAGNESIUM SERPL-MCNC: 2.8 MG/DL (ref 1.6–2.3)
MCH RBC QN AUTO: 33 PG (ref 26.5–33)
MCHC RBC AUTO-ENTMCNC: 33.4 G/DL (ref 31.5–36.5)
MCV RBC AUTO: 99 FL (ref 78–100)
MUCOUS THREADS #/AREA URNS LPF: PRESENT /LPF
NITRATE UR QL: NEGATIVE
O2/TOTAL GAS SETTING VFR VENT: ABNORMAL %
OXYHGB MFR BLD: 50 % (ref 92–100)
OXYHGB MFR BLDV: 85 %
OXYHGB MFR BLDV: 89 %
OXYHGB MFR BLDV: 93 %
PCO2 BLD: 31 MM HG (ref 35–45)
PCO2 BLDV: 21 MM HG (ref 40–50)
PCO2 BLDV: 25 MM HG (ref 40–50)
PCO2 BLDV: 30 MM HG (ref 40–50)
PH BLD: 7.11 PH (ref 7.35–7.45)
PH BLDV: 7.08 PH (ref 7.32–7.43)
PH BLDV: 7.12 PH (ref 7.32–7.43)
PH BLDV: 7.29 PH (ref 7.32–7.43)
PH UR STRIP: 5.5 PH (ref 5–7)
PHOSPHATE SERPL-MCNC: 1.4 MG/DL (ref 2.5–4.5)
PLATELET # BLD AUTO: 240 10E9/L (ref 150–450)
PO2 BLD: 30 MM HG (ref 80–105)
PO2 BLDV: 56 MM HG (ref 25–47)
PO2 BLDV: 57 MM HG (ref 25–47)
PO2 BLDV: 87 MM HG (ref 25–47)
POTASSIUM SERPL-SCNC: 3.8 MMOL/L (ref 3.4–5.3)
POTASSIUM SERPL-SCNC: 4 MMOL/L (ref 3.4–5.3)
POTASSIUM SERPL-SCNC: 4.3 MMOL/L (ref 3.4–5.3)
PROCALCITONIN SERPL-MCNC: 0.05 NG/ML
PROT SERPL-MCNC: 8.7 G/DL (ref 6.8–8.8)
RBC # BLD AUTO: 4.06 10E12/L (ref 3.8–5.2)
RBC #/AREA URNS AUTO: <1 /HPF (ref 0–2)
SALICYLATES SERPL-MCNC: <2 MG/DL
SARS-COV-2 RNA SPEC QL NAA+PROBE: NEGATIVE
SARS-COV-2 RNA SPEC QL NAA+PROBE: NORMAL
SODIUM SERPL-SCNC: 131 MMOL/L (ref 133–144)
SODIUM SERPL-SCNC: 134 MMOL/L (ref 133–144)
SODIUM SERPL-SCNC: 135 MMOL/L (ref 133–144)
SOURCE: ABNORMAL
SP GR UR STRIP: 1.03 (ref 1–1.03)
SPECIMEN SOURCE: NORMAL
SPECIMEN SOURCE: NORMAL
SQUAMOUS #/AREA URNS AUTO: 1 /HPF (ref 0–1)
TROPONIN I SERPL-MCNC: <0.015 UG/L (ref 0–0.04)
UROBILINOGEN UR STRIP-MCNC: NORMAL MG/DL (ref 0–2)
WBC # BLD AUTO: 4.3 10E9/L (ref 4–11)
WBC #/AREA URNS AUTO: 0 /HPF (ref 0–5)

## 2020-10-08 PROCEDURE — 83735 ASSAY OF MAGNESIUM: CPT | Performed by: INTERNAL MEDICINE

## 2020-10-08 PROCEDURE — 93308 TTE F-UP OR LMTD: CPT | Mod: 26 | Performed by: INTERNAL MEDICINE

## 2020-10-08 PROCEDURE — 999N000128 HC STATISTIC PERIPHERAL IV START W/O US GUIDANCE

## 2020-10-08 PROCEDURE — 93321 DOPPLER ECHO F-UP/LMTD STD: CPT

## 2020-10-08 PROCEDURE — 83605 ASSAY OF LACTIC ACID: CPT | Performed by: INTERNAL MEDICINE

## 2020-10-08 PROCEDURE — 36415 COLL VENOUS BLD VENIPUNCTURE: CPT | Performed by: STUDENT IN AN ORGANIZED HEALTH CARE EDUCATION/TRAINING PROGRAM

## 2020-10-08 PROCEDURE — 250N000009 HC RX 250: Performed by: INTERNAL MEDICINE

## 2020-10-08 PROCEDURE — 96375 TX/PRO/DX INJ NEW DRUG ADDON: CPT

## 2020-10-08 PROCEDURE — 82805 BLOOD GASES W/O2 SATURATION: CPT | Performed by: STUDENT IN AN ORGANIZED HEALTH CARE EDUCATION/TRAINING PROGRAM

## 2020-10-08 PROCEDURE — 120N000001 HC R&B MED SURG/OB

## 2020-10-08 PROCEDURE — 258N000003 HC RX IP 258 OP 636: Performed by: INTERNAL MEDICINE

## 2020-10-08 PROCEDURE — 84100 ASSAY OF PHOSPHORUS: CPT | Performed by: INTERNAL MEDICINE

## 2020-10-08 PROCEDURE — 80048 BASIC METABOLIC PNL TOTAL CA: CPT | Performed by: STUDENT IN AN ORGANIZED HEALTH CARE EDUCATION/TRAINING PROGRAM

## 2020-10-08 PROCEDURE — 93325 DOPPLER ECHO COLOR FLOW MAPG: CPT | Mod: 26 | Performed by: INTERNAL MEDICINE

## 2020-10-08 PROCEDURE — 84484 ASSAY OF TROPONIN QUANT: CPT | Performed by: INTERNAL MEDICINE

## 2020-10-08 PROCEDURE — 36415 COLL VENOUS BLD VENIPUNCTURE: CPT | Performed by: INTERNAL MEDICINE

## 2020-10-08 PROCEDURE — 93005 ELECTROCARDIOGRAM TRACING: CPT

## 2020-10-08 PROCEDURE — 250N000011 HC RX IP 250 OP 636: Performed by: INTERNAL MEDICINE

## 2020-10-08 PROCEDURE — 250N000013 HC RX MED GY IP 250 OP 250 PS 637: Performed by: INTERNAL MEDICINE

## 2020-10-08 PROCEDURE — 250N000011 HC RX IP 250 OP 636: Performed by: EMERGENCY MEDICINE

## 2020-10-08 PROCEDURE — 84145 PROCALCITONIN (PCT): CPT | Performed by: EMERGENCY MEDICINE

## 2020-10-08 PROCEDURE — 96372 THER/PROPH/DIAG INJ SC/IM: CPT | Performed by: INTERNAL MEDICINE

## 2020-10-08 PROCEDURE — 99291 CRITICAL CARE FIRST HOUR: CPT | Performed by: INTERNAL MEDICINE

## 2020-10-08 PROCEDURE — 36600 WITHDRAWAL OF ARTERIAL BLOOD: CPT

## 2020-10-08 PROCEDURE — HZ2ZZZZ DETOXIFICATION SERVICES FOR SUBSTANCE ABUSE TREATMENT: ICD-10-PCS | Performed by: INTERNAL MEDICINE

## 2020-10-08 PROCEDURE — 83605 ASSAY OF LACTIC ACID: CPT | Performed by: EMERGENCY MEDICINE

## 2020-10-08 PROCEDURE — 74177 CT ABD & PELVIS W/CONTRAST: CPT

## 2020-10-08 PROCEDURE — 82010 KETONE BODYS QUAN: CPT | Performed by: STUDENT IN AN ORGANIZED HEALTH CARE EDUCATION/TRAINING PROGRAM

## 2020-10-08 PROCEDURE — C9113 INJ PANTOPRAZOLE SODIUM, VIA: HCPCS | Performed by: EMERGENCY MEDICINE

## 2020-10-08 PROCEDURE — 96366 THER/PROPH/DIAG IV INF ADDON: CPT

## 2020-10-08 PROCEDURE — 250N000013 HC RX MED GY IP 250 OP 250 PS 637: Performed by: STUDENT IN AN ORGANIZED HEALTH CARE EDUCATION/TRAINING PROGRAM

## 2020-10-08 PROCEDURE — 82805 BLOOD GASES W/O2 SATURATION: CPT | Performed by: INTERNAL MEDICINE

## 2020-10-08 PROCEDURE — 83036 HEMOGLOBIN GLYCOSYLATED A1C: CPT | Performed by: INTERNAL MEDICINE

## 2020-10-08 PROCEDURE — 250N000009 HC RX 250: Performed by: STUDENT IN AN ORGANIZED HEALTH CARE EDUCATION/TRAINING PROGRAM

## 2020-10-08 PROCEDURE — 81001 URINALYSIS AUTO W/SCOPE: CPT | Performed by: EMERGENCY MEDICINE

## 2020-10-08 PROCEDURE — 96365 THER/PROPH/DIAG IV INF INIT: CPT | Mod: 59

## 2020-10-08 PROCEDURE — 250N000009 HC RX 250: Performed by: EMERGENCY MEDICINE

## 2020-10-08 PROCEDURE — 83605 ASSAY OF LACTIC ACID: CPT | Performed by: STUDENT IN AN ORGANIZED HEALTH CARE EDUCATION/TRAINING PROGRAM

## 2020-10-08 PROCEDURE — 82805 BLOOD GASES W/O2 SATURATION: CPT | Performed by: EMERGENCY MEDICINE

## 2020-10-08 PROCEDURE — 999N000157 HC STATISTIC RCP TIME EA 10 MIN

## 2020-10-08 PROCEDURE — 999N001017 HC STATISTIC GLUCOSE BY METER IP

## 2020-10-08 PROCEDURE — U0003 INFECTIOUS AGENT DETECTION BY NUCLEIC ACID (DNA OR RNA); SEVERE ACUTE RESPIRATORY SYNDROME CORONAVIRUS 2 (SARS-COV-2) (CORONAVIRUS DISEASE [COVID-19]), AMPLIFIED PROBE TECHNIQUE, MAKING USE OF HIGH THROUGHPUT TECHNOLOGIES AS DESCRIBED BY CMS-2020-01-R: HCPCS | Performed by: EMERGENCY MEDICINE

## 2020-10-08 PROCEDURE — 85027 COMPLETE CBC AUTOMATED: CPT | Performed by: INTERNAL MEDICINE

## 2020-10-08 PROCEDURE — 93306 TTE W/DOPPLER COMPLETE: CPT

## 2020-10-08 PROCEDURE — 258N000003 HC RX IP 258 OP 636: Performed by: EMERGENCY MEDICINE

## 2020-10-08 PROCEDURE — C9113 INJ PANTOPRAZOLE SODIUM, VIA: HCPCS | Performed by: INTERNAL MEDICINE

## 2020-10-08 PROCEDURE — 80307 DRUG TEST PRSMV CHEM ANLYZR: CPT | Performed by: INTERNAL MEDICINE

## 2020-10-08 PROCEDURE — 80053 COMPREHEN METABOLIC PANEL: CPT | Performed by: INTERNAL MEDICINE

## 2020-10-08 PROCEDURE — 96376 TX/PRO/DX INJ SAME DRUG ADON: CPT

## 2020-10-08 PROCEDURE — 250N000011 HC RX IP 250 OP 636: Performed by: STUDENT IN AN ORGANIZED HEALTH CARE EDUCATION/TRAINING PROGRAM

## 2020-10-08 PROCEDURE — 93321 DOPPLER ECHO F-UP/LMTD STD: CPT | Mod: 26 | Performed by: INTERNAL MEDICINE

## 2020-10-08 PROCEDURE — 93010 ELECTROCARDIOGRAM REPORT: CPT | Performed by: INTERNAL MEDICINE

## 2020-10-08 PROCEDURE — 999N001064 HC STATISTIC DRUG SCREEN MULTIPLE (METRO): Performed by: INTERNAL MEDICINE

## 2020-10-08 PROCEDURE — 82010 KETONE BODYS QUAN: CPT | Performed by: EMERGENCY MEDICINE

## 2020-10-08 PROCEDURE — 99233 SBSQ HOSP IP/OBS HIGH 50: CPT | Performed by: HOSPITALIST

## 2020-10-08 PROCEDURE — 80048 BASIC METABOLIC PNL TOTAL CA: CPT | Performed by: EMERGENCY MEDICINE

## 2020-10-08 RX ORDER — LORAZEPAM 2 MG/ML
2 INJECTION INTRAMUSCULAR ONCE
Status: DISCONTINUED | OUTPATIENT
Start: 2020-10-08 | End: 2020-10-08

## 2020-10-08 RX ORDER — LORAZEPAM 2 MG/ML
1 INJECTION INTRAMUSCULAR ONCE
Status: COMPLETED | OUTPATIENT
Start: 2020-10-08 | End: 2020-10-08

## 2020-10-08 RX ORDER — AMOXICILLIN 250 MG
1 CAPSULE ORAL 2 TIMES DAILY PRN
Status: DISCONTINUED | OUTPATIENT
Start: 2020-10-08 | End: 2020-10-10 | Stop reason: HOSPADM

## 2020-10-08 RX ORDER — METOPROLOL TARTRATE 1 MG/ML
2.5 INJECTION, SOLUTION INTRAVENOUS ONCE
Status: DISCONTINUED | OUTPATIENT
Start: 2020-10-08 | End: 2020-10-08

## 2020-10-08 RX ORDER — POTASSIUM CL/LIDO/0.9 % NACL 10MEQ/0.1L
10 INTRAVENOUS SOLUTION, PIGGYBACK (ML) INTRAVENOUS
Status: DISCONTINUED | OUTPATIENT
Start: 2020-10-08 | End: 2020-10-09

## 2020-10-08 RX ORDER — ACETAMINOPHEN 325 MG/1
650 TABLET ORAL EVERY 4 HOURS PRN
Status: DISCONTINUED | OUTPATIENT
Start: 2020-10-08 | End: 2020-10-10 | Stop reason: HOSPADM

## 2020-10-08 RX ORDER — IOPAMIDOL 755 MG/ML
63 INJECTION, SOLUTION INTRAVASCULAR ONCE
Status: COMPLETED | OUTPATIENT
Start: 2020-10-08 | End: 2020-10-08

## 2020-10-08 RX ORDER — NICOTINE POLACRILEX 4 MG
15-30 LOZENGE BUCCAL
Status: DISCONTINUED | OUTPATIENT
Start: 2020-10-08 | End: 2020-10-10 | Stop reason: HOSPADM

## 2020-10-08 RX ORDER — NALOXONE HYDROCHLORIDE 0.4 MG/ML
.1-.4 INJECTION, SOLUTION INTRAMUSCULAR; INTRAVENOUS; SUBCUTANEOUS
Status: DISCONTINUED | OUTPATIENT
Start: 2020-10-08 | End: 2020-10-10 | Stop reason: HOSPADM

## 2020-10-08 RX ORDER — POTASSIUM CHLORIDE 29.8 MG/ML
20 INJECTION INTRAVENOUS
Status: DISCONTINUED | OUTPATIENT
Start: 2020-10-08 | End: 2020-10-09

## 2020-10-08 RX ORDER — MAGNESIUM SULFATE HEPTAHYDRATE 40 MG/ML
4 INJECTION, SOLUTION INTRAVENOUS EVERY 4 HOURS PRN
Status: DISCONTINUED | OUTPATIENT
Start: 2020-10-08 | End: 2020-10-09

## 2020-10-08 RX ORDER — AMOXICILLIN 250 MG
2 CAPSULE ORAL 2 TIMES DAILY PRN
Status: DISCONTINUED | OUTPATIENT
Start: 2020-10-08 | End: 2020-10-10 | Stop reason: HOSPADM

## 2020-10-08 RX ORDER — THIAMINE HYDROCHLORIDE 100 MG/ML
100 INJECTION, SOLUTION INTRAMUSCULAR; INTRAVENOUS ONCE
Status: COMPLETED | OUTPATIENT
Start: 2020-10-08 | End: 2020-10-08

## 2020-10-08 RX ORDER — POTASSIUM CHLORIDE 1.5 G/1.58G
20-40 POWDER, FOR SOLUTION ORAL
Status: DISCONTINUED | OUTPATIENT
Start: 2020-10-08 | End: 2020-10-09

## 2020-10-08 RX ORDER — PROCHLORPERAZINE 25 MG
25 SUPPOSITORY, RECTAL RECTAL EVERY 12 HOURS PRN
Status: DISCONTINUED | OUTPATIENT
Start: 2020-10-08 | End: 2020-10-10 | Stop reason: HOSPADM

## 2020-10-08 RX ORDER — THIAMINE HYDROCHLORIDE 100 MG/ML
100 INJECTION, SOLUTION INTRAMUSCULAR; INTRAVENOUS DAILY
Status: DISCONTINUED | OUTPATIENT
Start: 2020-10-08 | End: 2020-10-10

## 2020-10-08 RX ORDER — LEVETIRACETAM 500 MG/1
1000 TABLET ORAL 2 TIMES DAILY
Status: DISCONTINUED | OUTPATIENT
Start: 2020-10-08 | End: 2020-10-08

## 2020-10-08 RX ORDER — POTASSIUM CHLORIDE 1500 MG/1
20-40 TABLET, EXTENDED RELEASE ORAL
Status: DISCONTINUED | OUTPATIENT
Start: 2020-10-08 | End: 2020-10-09

## 2020-10-08 RX ORDER — FOLIC ACID 1 MG/1
1 TABLET ORAL DAILY
Status: COMPLETED | OUTPATIENT
Start: 2020-10-08 | End: 2020-10-09

## 2020-10-08 RX ORDER — ONDANSETRON 2 MG/ML
4 INJECTION INTRAMUSCULAR; INTRAVENOUS EVERY 6 HOURS PRN
Status: DISCONTINUED | OUTPATIENT
Start: 2020-10-08 | End: 2020-10-10 | Stop reason: HOSPADM

## 2020-10-08 RX ORDER — METOPROLOL TARTRATE 1 MG/ML
5 INJECTION, SOLUTION INTRAVENOUS ONCE
Status: COMPLETED | OUTPATIENT
Start: 2020-10-08 | End: 2020-10-08

## 2020-10-08 RX ORDER — LEVETIRACETAM 10 MG/ML
1000 INJECTION INTRAVASCULAR EVERY 12 HOURS
Status: DISCONTINUED | OUTPATIENT
Start: 2020-10-08 | End: 2020-10-09

## 2020-10-08 RX ORDER — DEXTROSE MONOHYDRATE 25 G/50ML
25-50 INJECTION, SOLUTION INTRAVENOUS
Status: DISCONTINUED | OUTPATIENT
Start: 2020-10-08 | End: 2020-10-10 | Stop reason: HOSPADM

## 2020-10-08 RX ORDER — POTASSIUM CHLORIDE 7.45 MG/ML
10 INJECTION INTRAVENOUS
Status: DISCONTINUED | OUTPATIENT
Start: 2020-10-08 | End: 2020-10-09

## 2020-10-08 RX ORDER — DEXMEDETOMIDINE HYDROCHLORIDE 4 UG/ML
0.2-0.7 INJECTION, SOLUTION INTRAVENOUS CONTINUOUS
Status: DISCONTINUED | OUTPATIENT
Start: 2020-10-08 | End: 2020-10-08

## 2020-10-08 RX ORDER — MAGNESIUM SULFATE HEPTAHYDRATE 40 MG/ML
2 INJECTION, SOLUTION INTRAVENOUS DAILY PRN
Status: DISCONTINUED | OUTPATIENT
Start: 2020-10-08 | End: 2020-10-09

## 2020-10-08 RX ORDER — MULTIPLE VITAMINS W/ MINERALS TAB 9MG-400MCG
1 TAB ORAL DAILY
Status: COMPLETED | OUTPATIENT
Start: 2020-10-08 | End: 2020-10-09

## 2020-10-08 RX ORDER — FOLIC ACID 5 MG/ML
1 INJECTION, SOLUTION INTRAMUSCULAR; INTRAVENOUS; SUBCUTANEOUS ONCE
Status: COMPLETED | OUTPATIENT
Start: 2020-10-08 | End: 2020-10-08

## 2020-10-08 RX ADMIN — MAGNESIUM SULFATE IN WATER 2 G: 40 INJECTION, SOLUTION INTRAVENOUS at 06:13

## 2020-10-08 RX ADMIN — LORAZEPAM 1 MG: 2 INJECTION INTRAMUSCULAR; INTRAVENOUS at 04:30

## 2020-10-08 RX ADMIN — THIAMINE HYDROCHLORIDE 100 MG: 100 INJECTION, SOLUTION INTRAMUSCULAR; INTRAVENOUS at 01:42

## 2020-10-08 RX ADMIN — DEXTROSE AND SODIUM CHLORIDE 500 ML: 5; 900 INJECTION, SOLUTION INTRAVENOUS at 08:56

## 2020-10-08 RX ADMIN — ONDANSETRON 4 MG: 2 INJECTION INTRAMUSCULAR; INTRAVENOUS at 11:36

## 2020-10-08 RX ADMIN — POTASSIUM PHOSPHATE, MONOBASIC AND POTASSIUM PHOSPHATE, DIBASIC 15 MMOL: 224; 236 INJECTION, SOLUTION, CONCENTRATE INTRAVENOUS at 09:04

## 2020-10-08 RX ADMIN — LEVETIRACETAM 1000 MG: 10 INJECTION INTRAVENOUS at 22:10

## 2020-10-08 RX ADMIN — METOPROLOL TARTRATE 12.5 MG: 25 TABLET, FILM COATED ORAL at 19:45

## 2020-10-08 RX ADMIN — DEXTROSE AND SODIUM CHLORIDE: 5; 900 INJECTION, SOLUTION INTRAVENOUS at 08:16

## 2020-10-08 RX ADMIN — IOPAMIDOL 63 ML: 755 INJECTION, SOLUTION INTRAVENOUS at 00:26

## 2020-10-08 RX ADMIN — THIAMINE HYDROCHLORIDE 100 MG: 100 INJECTION, SOLUTION INTRAMUSCULAR; INTRAVENOUS at 08:14

## 2020-10-08 RX ADMIN — LEVETIRACETAM 1000 MG: 10 INJECTION INTRAVENOUS at 10:07

## 2020-10-08 RX ADMIN — MULTIPLE VITAMINS W/ MINERALS TAB 1 TABLET: TAB at 08:14

## 2020-10-08 RX ADMIN — PANTOPRAZOLE SODIUM 40 MG: 40 INJECTION, POWDER, FOR SOLUTION INTRAVENOUS at 08:14

## 2020-10-08 RX ADMIN — ENOXAPARIN SODIUM 40 MG: 40 INJECTION SUBCUTANEOUS at 05:11

## 2020-10-08 RX ADMIN — METOPROLOL TARTRATE 5 MG: 5 INJECTION INTRAVENOUS at 11:31

## 2020-10-08 RX ADMIN — DEXTROSE AND SODIUM CHLORIDE 1000 ML: 5; 900 INJECTION, SOLUTION INTRAVENOUS at 01:43

## 2020-10-08 RX ADMIN — FOLIC ACID 1 MG: 5 INJECTION, SOLUTION INTRAMUSCULAR; INTRAVENOUS; SUBCUTANEOUS at 01:40

## 2020-10-08 RX ADMIN — SODIUM CHLORIDE 83 ML: 9 INJECTION, SOLUTION INTRAVENOUS at 00:26

## 2020-10-08 RX ADMIN — DEXTROSE AND SODIUM CHLORIDE: 5; 900 INJECTION, SOLUTION INTRAVENOUS at 17:39

## 2020-10-08 RX ADMIN — FOLIC ACID 1 MG: 1 TABLET ORAL at 08:14

## 2020-10-08 RX ADMIN — PANTOPRAZOLE SODIUM 40 MG: 40 INJECTION, POWDER, FOR SOLUTION INTRAVENOUS at 02:38

## 2020-10-08 ASSESSMENT — ACTIVITIES OF DAILY LIVING (ADL)
ADLS_ACUITY_SCORE: 10

## 2020-10-08 NOTE — PROGRESS NOTES
Patient admitted to ICU room 345 from ED. Patient is still nauseated with upper abdominal pain 7/10. Alert and oriented x 4. LS cta O2 sats 100% on room air. Sinus tachy 128 /96 (105).

## 2020-10-08 NOTE — PROGRESS NOTES
Brief Update Daily Progress Note:    Subjective:    Patient reports continued abdominal pain and had some nausea this morning.  She had some dizziness earlier this morning which is better now.   She was getting tachycardic to 170s at times this AM.      Objective:    Vitals:  BP normotensive, -170s, RR 20s   Physical Exam:  Patient's abdomen is tender, soft, non-distended; no BLE edema; alert and oriented  Labs:  CMP notable for CO2 10; lactate 2.3 which is coming down; VBG - pH 7.12, PCO2 25, bicarb 8, BD 19.5; ketones 6.4.   EKG:  Sinus tachycardia     Assessment/Plan:    Patient is a 25 YOF who presented with worsening abdominal pain, nausea, vomiting, dyspnea, decreased UOP who was found to have alcoholic vs starvation ketosis as well as possible acidosis related to dehydration and initial profound tachycardia.  She has chronic cardiomyopathy with EF of 25-30% secondary to chemotherapy she underwent for lymphoma.    -Ketosis:     -Start on D5NS @ 100 ml/h   -Thiamine and folic acid for malnutrition   -Resuscitated with IVFs but would be judicious with further resucitation given CHF  -Monitor UOP   -Restart home Metoprolol 12.5 mg BID and give Metoprolol 5 mg IV x1 for tachycardia  -Repeat ABG, BMP, lactate, and ketones later today  -Resume home diet but monitor for refeeding syndrome  -Restarted home Keppra  -Discontinue Precedex   -Obtain echo today    Leandro Coffman MD on 10/8/2020 at 12:23 PM

## 2020-10-08 NOTE — UTILIZATION REVIEW
"  Admission Status; Secondary Review Determination         Under the authority of the Utilization Management Committee, the utilization review process indicated a secondary review on the above patient.  The review outcome is based on review of the medical records, discussions with staff, and applying clinical experience noted on the date of the review.        (x)      Inpatient Status Appropriate - This patient's medical care is consistent with medical management for inpatient care and reasonable inpatient medical practice.      () Observation Status Appropriate - This patient does not meet hospital inpatient criteria and is placed in observation status. If this patient's primary payer is Medicare and was admitted as an inpatient, Condition Code 44 should be used and patient status changed to \"observation\".   () Admission Status NOT Appropriate - This patient's medical care is not consistent with medical management for Inpatient or Observation Status.          RATIONALE FOR DETERMINATION     Darya Hamilton is a 25 year old female with known cardiomyopathy (secondary to chemotherapy for lymphoma), seizure disorder, alcohol dependence, and severe protein energy malnutrition who was admitted with abdominal pain, nausea, vomiting, dyspnea, and decreased urine output.  She was found to have alcoholic vs starvation ketosis and metabolic acidosis requiring IVF, IV bicarbonate, Precedex drip, close monitoring and ICU admission.  She is at increased risk for complications and/or clinical decompensation.  It is reasonable to anticipate a hospital stay of at least 2 midnights.  IP status appropriate.    The severity of illness, intensity of service provided, expected LOS and risk for adverse outcome make the care complex, high risk and appropriate for hospital admission.        The information on this document is developed by the utilization review team in order for the business office to ensure compliance.  This only " denotes the appropriateness of proper admission status and does not reflect the quality of care rendered.         The definitions of Inpatient Status and Observation Status used in making the determination above are those provided in the CMS Coverage Manual, Chapter 1 and Chapter 6, section 70.4.      Sincerely,     Darlene Bonilla MD  Physician Advisor   Utilization Review/ Case Management  [facility].

## 2020-10-08 NOTE — ED NOTES
DATE:  10/8/2020   TIME OF RECEIPT FROM LAB:  0225  LAB TEST:  CO2  LAB VALUE:  6  RESULTS GIVEN WITH READ-BACK TO (PROVIDER):  Scotty Willoughby MD  TIME LAB VALUE REPORTED TO PROVIDER:   0228

## 2020-10-08 NOTE — PROVIDER NOTIFICATION
MD Notification    Notified Person: MD    Notified Person Name: Dr. Coffman    Notification Date/Time: 10/8/20 1023    Purpose of Notification: CO2 10    Orders Received: no new orders

## 2020-10-08 NOTE — PROGRESS NOTES
Nursing note  Pt transferred form ICU to station 66 around 1715 via w/chair. Settled pt down and orient pt to the room. Pt denies any dizziness or lightheadedness. Pt denies any n/v. Pt denies any pain, no c/o SOB/MENA.Will continue to monitor.

## 2020-10-08 NOTE — PLAN OF CARE
PT/OT: Orders received, chart reviewed, pt admitted to ICU this AM, will hold initiating therapies at this time to allow for further medical management to allow for most accurate discharge planning.

## 2020-10-08 NOTE — PROGRESS NOTES
[facility]    Hospitalist Progress Note    Date of Admission:  10/7/2020    Assessment & Plan     Interim summary    25-year-old female admitted late evening of 10/7/2022 ICU for shortness of breath.  She presented to ED for evaluation of shortness of breath and ongoing emesis with possible hematemesis times few days.    She has known history of SVT, nonischemic cardiomyopathy with EF of 20-25% in May 2020, seizure disorder, h/o empyema vs hemothorax in 2016, alcohol use disorder.  She also reports h/o Hodgkin's lymphoma for which she received chemotherapy few years ago and may have caused her cardiomyopathy.  She was noted to have sinus tachycardia in the 140s to 160s, high anion gap metabolic acidosis with venous pH 7.1, bicarb 6, lactate 6.0, negative troponin, alcohol level 0.11, ketones 6.4.  She was aggressively resuscitated with IV fluids, IV bicarb drip, received IV Ativan for agitation and admitted to ICU.  CT chest/abdomen negative for PE, did not show any acute pathology.  She had a similar presentation in May this year when she was incidentally found to have severe cardiomyopathy that was thought to be possibly due to stress induced CM.  She was started on metoprolol then.  She also had lactic acidosis and ketoacidosis then and had hematemesis thought to be due to MW tear.  During this presentation, again suspect that her significant high anion gap metabolic acidosis is due to profound dehydration with lactic acidosis/starvation ketoacidosis.  She states that last alcohol use was 6 days prior however blood alcohol level was 0.11.  Tachycardia likely multifactorial secondary to dehydration, acidosis,?  Alcohol withdrawal,?  BB withdrawal.  In ICU, continued with IVF resuscitation, IV thiamine, folate, IV PPI, bicarb drip.  Lactic acidosis has resolved.  Ketones decreased to 2.9.  Bicarb up to 16 with other electrolytes normal.  Of note she is not diabetic and her blood sugars  have been acceptable.  Hospitalist service was contacted to transfer patient out of ICU on 10/8/2020.    High anion gap metabolic acidosis possibly due to starvation ketoacidosis and lactic acidosis secondary to profound dehydration, improved  Intractable nausea and vomiting, improving  Sinus tachycardia, improved  Shortness of breath, improved  Hyponatremia, resolved  Acidosis has improved with aggressive IVF resuscitation.  Last venous pH 7.29, serum bicarb 16, ketones 2.9, lactate 1.6.  No evidence of infection with blood cultures NGTD and negative procalcitonin.  Continues to have nausea.  Unclear etiology for her significant nausea and vomiting.  -Transfer out of ICU to medical bed  -Continue IV D5 NS at 100 cc/h  -Continue telemetry monitoring  -Replacement protocols for potassium, magnesium and phosphorus  -If persistent nausea, consider GI consult for EGD, ? Gastroparesis  -PRN IV antiemetics, Tylenol    Nonischemic cardiomyopathy  H/o SVT, symptomatic ventricular tachycardia  First noted in May 2020 with EF 20 to 25%, felt to be stress related CM.  Could also be secondary to ? Chemotherapy for Hodgkin's lymphoma.  No available records for this.  TTE repeated on 10/8 shows improved EF to 46% with mild global hypokinesis and no significant valvular abnormalities.  This would more likely support diagnosis of stress-induced cardiomyopathy in May.  Per ICU physician report, cardiology was contacted here and they did not make any new recommendations.  No note available.  -Unclear how compliant she has been with metoprolol use.  -12.5 mg twice daily metoprolol has been resumed today.  -Be cautious with IVF given CM.  -Monitor on telemetry    Alcohol use disorder  Patient denies drinking daily.  States that she has been trying to cut back on alcohol intake since her admission in May.  Reports last alcohol intake about 6 days prior however blood alcohol level was 0.11.  Not sure how reliable her history  is.  -Consult to chemical dependency  -Monitor for alcohol withdrawal.  -CIWA scoring with symptom triggered Ativan  -Continue IV thiamine and oral multivitamin    Hematemesis  Limited hematemesis noted by patient most likely Kayla-Edwards tear secondary to severe retching.  Hemoglobin at 13.4.  No report of hematemesis in ICU.  -Monitor Hb  -Continue IV PPI    Transaminitis  Likely due to intractable nausea and vomiting.  Improving with IVF.  Lipase normal.  Hepatic steatosis noted on CT.  -Monitor LFTs intermittently    Seizure disorder  Patient reports inconsistent use of Keppra.  States last seizure episode was earlier this year.  -Resumed twice daily IV Keppra given ongoing nausea.    Severe protein calorie malnutrition  -Consult nutrition  -Clear liquid diet at this point.  Advance as tolerated.    Concern for eating disorder  There was some report of witnessing patient trying to induce vomiting in the ICU.  -Consult to psychiatry has been placed.  Pending.    History of empyema versus hemothorax   Reports that she was hospitalized at [facility] in [place] for 1 month, required blood transfusion during that hospitalization though cannot provide further details.  This was prior to her moving to [place] in 2017.      History of Hodgkin's lymphoma status post chemotherapy   This was in [place] prior to moving to [place].    Renetta Lugo MD  [employer] Page (7am - 6pm, M-F)    Interval History   Patient is doing better this afternoon, however continues to have nausea.  States that even water makes her nauseous.  Reports that she does not remember when her last BM was.  No fevers or chills.  No cough.  Shortness of breath is improved.  Reports her stomach is still sensitive.    -Data reviewed today: I reviewed all new labs and imaging results over the last 24 hours. I personally reviewed EKG with result as noted above and CT scan with result as noted above    Physical Exam   Temp: 98.3  F (36.8   C) Temp src: Oral BP: (!) 125/96 Pulse: 113   Resp: 19 SpO2: 98 % O2 Device: None (Room air)    Vitals:    10/07/20 2220 10/08/20 0453   Weight: 56.7 kg (125 lb) 54.7 kg (120 lb 9.5 oz)     Vital Signs with Ranges  Temp:  [97.9  F (36.6  C)-99.8  F (37.7  C)] 98.3  F (36.8  C)  Pulse:  [101-160] 113  Resp:  [12-25] 19  BP: (102-131)/() 125/96  SpO2:  [93 %-100 %] 98 %  I/O last 3 completed shifts:  In: 1963.33 [I.V.:1963.33]  Out: 800 [Urine:800]    Constitutional: Sleepy but easily arousable, appears a bit uncomfortable and keeps shifting in bed, in no acute distress, reports ongoing nausea  Respiratory: Non labored breathing, clear to auscultation bilaterally, no crackles or wheezes  Cardiovascular: Heart sounds regular rate and rhythm, tachycardic, no murmurs, no leg edema  GI: Abdomen is soft, non distended, vaguely tender to palpation all over. Normal BS  Skin/Integumen: no rashes, no pressure sores  Neuro: Sleepy but easily arousable, converses appropriately, moving all extremities, fluent speech, no facial asymmetry  Psych: mood and affect appropriate      Medications     dexmedetomidine       dextrose 5% and 0.9% NaCl 100 mL/hr at 10/08/20 0816       enoxaparin ANTICOAGULANT  40 mg Subcutaneous Q24H     folic acid  1 mg Oral Daily     insulin aspart  1-3 Units Subcutaneous TID AC     insulin aspart  1-3 Units Subcutaneous At Bedtime     levETIRAcetam  1,000 mg Intravenous Q12H     metoprolol tartrate  12.5 mg Oral BID     multivitamin w/minerals  1 tablet Oral Daily     pantoprazole (PROTONIX) IV  40 mg Intravenous Daily with breakfast     thiamine  100 mg Intravenous Daily       Data   Recent Labs   Lab 10/08/20  1413 10/08/20  0911 10/08/20  0134 10/07/20  2224   WBC  --  4.3  --  3.4*   HGB  --  13.4  --  16.0*   MCV  --  99  --  97   PLT  --  240  --  306    131* 134 132*   POTASSIUM 3.8 4.3 4.0 3.6   CHLORIDE 108 104 104 97   CO2 16* 10* 6* 10*   BUN 4* 4* 8 10   CR 0.52 0.61 0.53 0.65    ANIONGAP 11 17* 24* 25*   DONOVAN 7.6* 7.4* 8.0* 9.0   * 219* 93 96   ALBUMIN  --  4.7  --  5.3*   PROTTOTAL  --  8.7  --  10.1*   BILITOTAL  --  0.9  --  1.0   ALKPHOS  --  69  --  88   ALT  --  97*  --  114*   AST  --  150*  --  185*   LIPASE  --   --   --  213   TROPI  --  <0.015  --  <0.015       Imaging  Recent Results (from the past 24 hour(s))   CT Chest (PE) Abdomen Pelvis w Contrast    Narrative    EXAM: CT CHEST PE ABDOMEN PELVIS W CONTRAST  LOCATION: [facility]  DATE/TIME: 10/8/2020 12:44 AM    INDICATION: Tachycardia, shortness of breath, epigastric and chest pain. Hematemesis.  COMPARISON: 05/03/2020  TECHNIQUE: CT chest pulmonary angiogram and routine CT abdomen pelvis with IV contrast. Arterial phase through the chest and venous phase through the abdomen and pelvis. Multiplanar reformats and MIP reconstructions were performed. Dose reduction   techniques were used.   CONTRAST: 63mL [id number]-370    FINDINGS:  ANGIOGRAM CHEST: Pulmonary arteries are normal caliber and negative for pulmonary emboli. Thoracic aorta is negative for dissection. No CT evidence of right heart strain.     LUNGS AND PLEURA: No acute infiltrates.    MEDIASTINUM/AXILLAE: No significant mediastinal or hilar adenopathy.    HEPATOBILIARY: Hepatic steatosis. Cholecystectomy.    PANCREAS: Unremarkable    SPLEEN: Unremarkable    ADRENAL GLANDS: Unremarkable    KIDNEYS/BLADDER: No hydronephrosis.    BOWEL: Mild distal esophageal wall thickening, decreased in the interval. Mild ascending colonic wall thickening, favored secondary to nondistention, without adjacent inflammatory stranding.    LYMPH NODES: No significant retroperitoneal adenopathy.    VASCULATURE: No abdominal aortic aneurysm. Patent portal vein.    PELVIC ORGANS: Heterogeneous uterus, likely secondary to small underlying fibroids.    MUSCULOSKELETAL: No acute bony abnormalities.      Impression    IMPRESSION:  1.  No evidence of pulmonary embolus.  2.   Hepatic steatosis.  3.  Mild nonspecific distal esophageal thickening, decreased in interval   Echocardiogram Limited    Narrative    [id number]  [id number]  [id number]  [id number]^FESTUS^STEVE^RAUL           [facility]  Echocardiography Laboratory  [address] [place] [address]        Name: JOHN COOLEY  MRN: [id number]  : 1995  Study Date: 10/08/2020 11:48 AM  Age: 25 yrs  Gender: Female  Patient Location: [facility]  Reason For Study: Cardiomyopathy  Ordering Physician: STEVE MORTENSEN  Performed By: Gail Wesley     BSA: 1.5 m2  Height: 62 in  Weight: 120 lb  HR: 101  BP: 126/98 mmHg  _____________________________________________________________________________  __        Procedure  Limited Portable Echo Adult.  _____________________________________________________________________________  __        Interpretation Summary     Left ventricular systolic function is mildly reduced. LVEF 46% based on  biplane 2D tracing. Mild global hypokinesis.  Right ventricular global function is normal.  No significant valvular abnormalities.     This study was compared to a TTE from 5/3/2020. There has been significant  improvement in global LV systolic function, the previously noted regional wall  motion abnormalities have resolved. Rhythm is now sinus tachycardia.  _____________________________________________________________________________  __        Left Ventricle  The left ventricle is normal in size. There is normal left ventricular wall  thickness. Left ventricular systolic function is mildly reduced. LVEF 46%  based on biplane 2D tracing. There is mild global hypokinesia of the left  ventricle.     Right Ventricle  The right ventricle is normal in structure, function and size.     Atria  Normal left atrial size. Right atrial size is normal. There is no color  Doppler evidence of an atrial shunt.     Mitral Valve  The mitral valve is normal in structure and function.         Tricuspid Valve  The tricuspid valve is normal in structure and function. There is physiologic  tricuspid regurgitation. Right ventricular systolic pressure could not be  approximated due to inadequate tricuspid regurgitation.     Aortic Valve  The aortic valve is normal in structure and function.     Pulmonic Valve  The pulmonic valve is not well seen, but is grossly normal.     Vessels  The aortic root is normal size. Inferior vena cava not well visualized for  estimation of right atrial pressure.     Pericardium  There is no pericardial effusion.        Rhythm  The rhythm was sinus tachycardia.  _____________________________________________________________________________  __  MMode/2D Measurements & Calculations     IVSd: 0.98 cm  LVIDd: 4.5 cm  LVIDs: 3.4 cm  LVPWd: 0.96 cm  FS: 25.3 %  LV mass(C)d: 148.7 grams  LV mass(C)dI: 96.7 grams/m2  RWT: 0.42                 _____________________________________________________________________________  __           Report approved by: Mely Mcqueen 10/08/2020 12:31 PM

## 2020-10-08 NOTE — H&P
[facility] Intensive Care Unit  History and Physical  October 8, 2020    Darya Hamilton   MRN# [id number]   Age: 25 year old YOB: 1995     Date of Admission: 10/7/2020    Primary care provider:           Assessment and plan :   Darya Hamilton is a 25 year old female admitted on 10/7/2020 for shortness of breath.  She presented to the emergency room for evaluation of her shortness of breath and ongoing emesis with possible throwing up of blood at home.    Neurology:   -History of alcohol use.  Does not provide clear history of recent alcohol use.  -Agitation improved after dose of Ativan in the emergency room.  - On CIWA protocol.  Precedex drip has been ordered.  - IV thiamine, folic acid.    -Ethanol level of 0.11 will be considered severely elevated based on the patient's history of last no alcohol intake of 4 days.    - Seizure disorder:  -Restart Keppra.  Started on IV Keppra for now because of ongoing nausea and vomiting.    Cardiovascular:   -Nonischemic cardiomyopathy and with a EF in the range of 20 to 30%.  Holding her beta-blocker for now will restart in the morning.  - She will need to follow-up with cardiology as an outpatient.  - Started on IV thiamine as of now unlikely to be wet beriberi    -Sinus tachycardia: Improved with volume resuscitation.  Aggressive repletion of electrolytes    Pulmonary/Ventilator Management:   -Shortness of breath has improved with volume resuscitation.  - Most likely she was having respiratory compensation for severe metabolic acidosis.  And she was getting tired from working hard in breathing.  -CT PE ordered by ER and negative for any pulmonary embolism or pulmonary parenchymal abnormality.    GI/Nutrition:  -Severe starvation ketoacidosis is noted.  Elevated ketones in the urine is noted.  - Severe protein energy malnutrition is noted in the patient.  - Limited hematemesis noted by the patient most likely Kayla-Edwards tear secondary to  severe retching.  - Monitor CBC.  IV PPI twice daily for now.  -Clear liquid diet at this point advance as tolerated.  -Lipase is in the normal range cannot completely rule out pancreatitis monitor.    -I will discussed with the morning team on whether or not to check Gastrografin swallow to make sure there is no esophageal perforation.    Renal/fluids/electrolytes:   -Severe metabolic acidosis secondary to elevated lactate and ketones.  - Improved with volume resuscitation lactate dropped to 2.7 from 6.1.  - On bicarb drip with total of 150 mg and 1 L.    Infectious Disease:   -Blood cultures are collected.  Unlikely to be infectious.  -Will not order any antibiotics.  Follow-up procalcitonin.  -  Endocrine:   -Patient probably does not have any glycogen-storage.  At high risk of developing hypoglycemia.  Insulin sliding scale with glucose checks are ordered.    Hematology/Oncology:   -History of Hodgkin's lymphoma status post chemotherapy.    IV/Access:   -PIV's.    Prophylaxis:   DVT Prophylaxis: Enoxaparin (Lovenox) SQ  GI Prophylaxis: PPI    Restraints: Restraints for medical healing needed: NO  Family update by me today: No    Lines: No erythema or discharge at entry site for PIVs      Medications     dexmedetomidine       sodium bicabonate in 5% dextrose for infusion Stopped (10/08/20 0327)       enoxaparin ANTICOAGULANT  40 mg Subcutaneous Q24H     folic acid  1 mg Oral Daily     levETIRAcetam  1,000 mg Oral BID     multivitamin w/minerals  1 tablet Oral Daily     pantoprazole (PROTONIX) IV  40 mg Intravenous Daily with breakfast     thiamine  100 mg Intravenous Daily       Data        Goals for the next 24 hours :      -Resuscitation.  May be able to leave the ICU later in the day.            Time Spent on this Encounter   Billing:  I spent 36 minutes bedside and on the inpatient unit today managing the critical care of Darya Hamilton in relation to the issues listed in this note.    Hem Swenson,  MD  Pulmonary & Critical Care Medicine  [facility]            Chief Complaint/ Reason for ICU Admission and HPI     Admitted for :   Chief Complaint   Patient presents with     Shortness of Breath     History obtained from the patient.  History of Present Illness:        -Patient is a 25-year-old female with a past medical history of nonischemic cardiomyopathy, seizure disorder, Hodgkin's lymphoma and possible alcohol abuse who has presented to the emergency room for the further management and evaluation of her hematemesis and nausea vomiting for past 1 days.    -On presentation to the emergency room patient was severely tachycardic and anxious.  Her blood analysis showed severe metabolic acidosis secondary to lactic acidosis and ketoacidosis and ketonuria.  Her anxiety and tachycardia improved with the 1 single dose of lorazepam.  -With aggressive volume resuscitation and bicarb drip like her acidosis has improved.  She is also complaining of shortness of breath most likely secondary to respiratory compensation for her severe metabolic acidosis.    -Of note patient has had similar presentation 5 months ago with a presentation of starvation ketoacidosis and sinus tachycardia at that time improved with the use of warm resuscitation.    -Patient's history has been not very clear.  She endorses that she started having retching and nausea and vomiting for past 1 to 2 days and later yesterday afternoon she had episodes of hematemesis.  Unable to give the proper amount of blood thrown up.  But it has been after the end of multiple episodes of retching and throwing up.  She is also complaining of epigastric pain.    -Unable to give me a very good history and how much alcohol use she has not had.  She endorses of drinking alcohol 4 days ago and the family gathering.  Unable to give me exact amount of alcohol intake.  Of note her ethanol level was 0.11 which can be considered significantly elevated if her last  alcohol intake was 4 days ago.           Past Medical History:     Past Medical History:   Diagnosis Date     HFrEF (heart failure with reduced ejection fraction) (H) 05/2020    EF 20-25%, likely stress-induced cardiomyopathy     Hodgkin's lymphoma (H)     in remission - 2011     Seizures (H)             Past Surgical History:     Past Surgical History:   Procedure Laterality Date     CHOLECYSTECTOMY              Social History:     Social History     Socioeconomic History     Marital status: Single     Spouse name: Not on file     Number of children: Not on file     Years of education: Not on file     Highest education level: Not on file   Occupational History     Not on file   Social Needs     Financial resource strain: Not on file     Food insecurity     Worry: Not on file     Inability: Not on file     Transportation needs     Medical: Not on file     Non-medical: Not on file   Tobacco Use     Smoking status: Never Smoker     Smokeless tobacco: Never Used   Substance and Sexual Activity     Alcohol use: Yes     Comment: 1-2/week     Drug use: No     Sexual activity: Not on file   Lifestyle     Physical activity     Days per week: Not on file     Minutes per session: Not on file     Stress: Not on file   Relationships     Social connections     Talks on phone: Not on file     Gets together: Not on file     Attends Judaism service: Not on file     Active member of club or organization: Not on file     Attends meetings of clubs or organizations: Not on file     Relationship status: Not on file     Intimate partner violence     Fear of current or ex partner: Not on file     Emotionally abused: Not on file     Physically abused: Not on file     Forced sexual activity: Not on file   Other Topics Concern     Not on file   Social History Narrative     Not on file     Smoking: No.   History   Smoking Status     Never Smoker   Smokeless Tobacco     Never Used     Alcohol: Yes   Social History    Substance and Sexual  Activity      Alcohol use: Yes        Comment: 1-2/week    Drug:  No   History   Drug Use No            Family History:   No family history on file.         Allergies:   Please see allergy list which was reviewed this admission.  All allergies reviewed and addressed         Medications:     Current Facility-Administered Medications   Medication     dexmedetomidine (PRECEDEX) 400 mcg in 0.9% sodium chloride 100 mL     glucose gel 15-30 g    Or     dextrose 50 % injection 25-50 mL    Or     glucagon injection 1 mg     enoxaparin ANTICOAGULANT (LOVENOX) injection 40 mg     folic acid (FOLVITE) tablet 1 mg     insulin aspart (NovoLOG) injection (RAPID ACTING)     insulin aspart (NovoLOG) injection (RAPID ACTING)     levETIRAcetam (KEPPRA) intermittent infusion 1,000 mg     magnesium sulfate 2 g in water intermittent infusion     magnesium sulfate 4 g in 100 mL sterile water (premade)     multivitamin w/minerals (THERA-VIT-M) tablet 1 tablet     naloxone (NARCAN) injection 0.1-0.4 mg     ondansetron (ZOFRAN) injection 4 mg     pantoprazole (PROTONIX) IV push injection 40 mg     potassium chloride (KLOR-CON) Packet 20-40 mEq     potassium chloride 10 mEq in 100 mL intermittent infusion with 10 mg lidocaine     potassium chloride 10 mEq in 100 mL sterile water intermittent infusion (premix)     potassium chloride 20 mEq in 50 mL intermittent infusion     potassium chloride ER (KLOR-CON M) CR tablet 20-40 mEq     potassium phosphate 10 mmol in D5W 250 mL intermittent infusion     potassium phosphate 15 mmol in D5W 250 mL intermittent infusion     potassium phosphate 20 mmol in D5W 250 mL intermittent infusion     potassium phosphate 20 mmol in D5W 500 mL intermittent infusion     potassium phosphate 25 mmol in D5W 500 mL intermittent infusion     senna-docusate (SENOKOT-S/PERICOLACE) 8.6-50 MG per tablet 1 tablet    Or     senna-docusate (SENOKOT-S/PERICOLACE) 8.6-50 MG per tablet 2 tablet     sodium bicarbonate 150 mEq in  5% dextrose for infusion     thiamine (B-1) injection 100 mg            Review of Systems:   CONSTITUTIONAL: NEGATIVE for fever, chills, change in weight  ENT/MOUTH: NEGATIVE for ear, mouth and throat problems  RESP: Shortness of breath  CV:  Chest tightness  GI: NEGATIVE for nausea, abdominal pain, heartburn, or change in bowel habits, POSITIVE for dyspepsia, abdominal pain epigastric and hematemesis         Physical Exam:   Vitals were reviewed  Physical Exam   Temp: 97.9  F (36.6  C) Temp src: Oral Temp  Min: 97.9  F (36.6  C)  Max: 97.9  F (36.6  C) BP: 131/86 Pulse: 137   Resp: 23 SpO2: 97 % O2 Device: None (Room air)      Vitals:    10/07/20 2220   Weight: 56.7 kg (125 lb)     No intake or output data in the 24 hours ending 10/08/20 0431    General:   Uncomfortable, in pain   Neurologic:   Alert and oriented x 3   HEENT:   Head is atraumatic      Lungs:   Symmetrical chest shape and movements with each tidal breath  Symmetrical and normal breath sounds, no wheeze, ronchi, crackles, rub or bronchial breath sounds   Cardiovascular:   Normal neck veins  Normal and symmetrical radial, femoral and carotid pulses  Regular rate and rhythm  Normal S1,S2, and no gallop, rub or murmur   Abdomen:   Distended:  No.   Bowel sound present and normal: Yes .   Soft: Yes . Tender: No.   Rebound:tendeness or guarding present No   Extremities:   Clubbing present: No,   Edema present: No,   Acrocyanosis present: No,   Mottling present: No,   Normal capillary refill: Yes    Skin:   Warm, dry.  No rash on limited exam.    Lines/Drain:    Central line present: No  Arterial line present: No  External ventricular Drain present: No  Chest tube present: No  ISAC present: No  PEG present: No           Ancillary Data:   All laboratory and imaging data reviewed.   ABG/vent data:   Resp: 23    Recent Labs   Lab 10/08/20  0134 10/07/20  2321   O2PER Room air 2L         ROUTINE IP LABS  Recent Labs   Lab 10/08/20  0134 10/07/20  2224   WBC  --   3.4*   HGB  --  16.0*   MCV  --  97   PLT  --  306    132*   POTASSIUM 4.0 3.6   CHLORIDE 104 97   CO2 6* 10*   BUN 8 10   CR 0.53 0.65   ANIONGAP 24* 25*   DONOVAN 8.0* 9.0   GLC 93 96   ALBUMIN  --  5.3*   PROTTOTAL  --  10.1*   BILITOTAL  --  1.0   ALKPHOS  --  88   ALT  --  114*   AST  --  185*   TROPI  --  <0.015     Recent Results (from the past 24 hour(s))   CT Chest (PE) Abdomen Pelvis w Contrast    Narrative    EXAM: CT CHEST PE ABDOMEN PELVIS W CONTRAST  LOCATION: [facility]  DATE/TIME: 10/8/2020 12:44 AM    INDICATION: Tachycardia, shortness of breath, epigastric and chest pain. Hematemesis.  COMPARISON: 05/03/2020  TECHNIQUE: CT chest pulmonary angiogram and routine CT abdomen pelvis with IV contrast. Arterial phase through the chest and venous phase through the abdomen and pelvis. Multiplanar reformats and MIP reconstructions were performed. Dose reduction   techniques were used.   CONTRAST: 63mL Isovue-370    FINDINGS:  ANGIOGRAM CHEST: Pulmonary arteries are normal caliber and negative for pulmonary emboli. Thoracic aorta is negative for dissection. No CT evidence of right heart strain.     LUNGS AND PLEURA: No acute infiltrates.    MEDIASTINUM/AXILLAE: No significant mediastinal or hilar adenopathy.    HEPATOBILIARY: Hepatic steatosis. Cholecystectomy.    PANCREAS: Unremarkable    SPLEEN: Unremarkable    ADRENAL GLANDS: Unremarkable    KIDNEYS/BLADDER: No hydronephrosis.    BOWEL: Mild distal esophageal wall thickening, decreased in the interval. Mild ascending colonic wall thickening, favored secondary to nondistention, without adjacent inflammatory stranding.    LYMPH NODES: No significant retroperitoneal adenopathy.    VASCULATURE: No abdominal aortic aneurysm. Patent portal vein.    PELVIC ORGANS: Heterogeneous uterus, likely secondary to small underlying fibroids.    MUSCULOSKELETAL: No acute bony abnormalities.      Impression    IMPRESSION:  1.  No evidence of pulmonary  embolus.  2.  Hepatic steatosis.  3.  Mild nonspecific distal esophageal thickening, decreased in interval           Hem MD Messi October 8, 2020

## 2020-10-08 NOTE — PROVIDER NOTIFICATION
MD Notification    Notified Person: MD    Notified Person Name: Dr. Coffman     Notification Date/Time: 10/8/20 4242    Purpose of Notification: Ketones 2.9    Orders Received: no new orders

## 2020-10-08 NOTE — PROVIDER NOTIFICATION
MD Notification    Notified Person: MD    Notified Person Name: Dr. Coffman    Notification Date/Time: 10/8/20 0994    Purpose of Notification: pH 7.11, PO2 30, HCO3 10.     Orders Received: No new orders, MD suspects arterial blood gas was actually venous.

## 2020-10-08 NOTE — ED PROVIDER NOTES
History     Chief Complaint:  Shortness of Breath      HPI   Darya Hamilton is a 25 year old female with history of SVT, stress-induced cardiomyopathy with EF of 20 to 25% in May 2020, seizure disorder, history of empyema versus hemothorax in 2016 who presents with nausea and vomiting, tachycardia, shortness of breath.  Patient states that last alcohol use was 4 days ago.  She denies drinking every day.  Over the past 2 days she has had constant nausea and vomiting.  There is associated upper abdominal discomfort and lower chest discomfort.  There are streaks of some bright red blood in emesis, which did start after she had been vomiting for some time.  She also noticed new shortness of breath today.  No fever.  She endorses coughing while vomiting but otherwise has not had a cough.  No diarrhea or constipation, no dysuria or hematuria.  No headache.  She denies other drug use.  While she has been trying to take her medications over the past 2 days, she is not sure if she has been able to keep them down.  This includes her metoprolol.    Allergies:  Asa  Hydrocodone    Medications:    The patient is not currently taking any prescribed medications.    Past Medical History:    HRrEF  Hodgkin's lymphoma  Seizures  Paroxysmal ventricular tachycardia  Ketoacidosis  Hyperkalemia  Delirium tremens  Hypomagnesemias  Leukopenia  Calculus of gallbladder with biliary obstruction    Past Surgical History:    Cholecystectomy  Portacath placement  Lung surgery chest tubes for pneumonia    Family History:    Asthma    Social History:  Smoking status: Never  Alcohol use: Yes (1-2 per week)  Drug use: No    Marital Status:  Single [1]     Review of Systems   Constitutional: Negative for fever.   Respiratory: Positive for cough and shortness of breath.    Cardiovascular: Positive for chest pain.   Gastrointestinal: Positive for abdominal pain, nausea and vomiting. Negative for constipation and diarrhea.   Genitourinary:  Negative for dysuria and hematuria.   Neurological: Negative for headaches.   All other systems reviewed and are negative.      Physical Exam     Patient Vitals for the past 24 hrs:   BP Temp Temp src Pulse Resp SpO2 Weight   10/08/20 0230 (!) 130/90 -- -- 144 23 98 % --   10/08/20 0220 -- -- -- 140 16 97 % --   10/08/20 0210 120/84 -- -- 131 19 97 % --   10/08/20 0145 127/89 -- -- 151 -- -- --   10/08/20 0130 -- -- -- 152 12 97 % --   10/08/20 0015 127/88 -- -- 142 22 93 % --   10/07/20 2223 121/89 -- -- 112 20 -- --   10/07/20 2220 (!) 131/100 97.9  F (36.6  C) Oral 140 -- 96 % 56.7 kg (125 lb)   10/07/20 2203 (!) 131/100 -- -- 160 -- 98 % --       Physical Exam  VS: Reviewed per above  HENT: Mucous membranes dry, no nuchal rigidity  EYES: sclera anicteric  CV: Rate as noted, regular rhythm.   RESP: Effort normal. Breath sounds are normal bilaterally.  GI: moderate epigastric tenderness without rebound/guarding, not distended.  NEURO: Alert, GCS 15, moving all extremities equally, no facial asymmetry.  MSK: No deformity of the extremities  SKIN: Warm and dry      Emergency Department Course   ECG (22:36:25):  Indication: Screening for cardiovascular disease.   Rate 114 bpm. ND interval 160 ms. QRS duration 84 ms. QT/QTc 346/476 ms. P-R-T axes 68 52 47.   Interpretation: Sinus tachycardia, Otherwise normal ECG   Agree with computer interpretation. Yes    No significant change compared to EKG dated 3/3/2020.   Interpreted at 2315 by Dr. Willoughby.      ECG (23:21:07):  Indication: Screening for cardiovascular disease.   Rate 124 bpm. ND interval 156 ms. QRS duration 72 ms. QT/QTc 318/456 ms. P-R-T axes 61 66 35.   Interpretation: Sinus tachycardia, Otherwise normal ECG   Agree with computer interpretation. Yes   No significant change compared to EKG dated 10/7/2020.   Interpreted at 2330 by Dr. Willoughby.       ECG (01:47:12):  Indication: Screening for cardiovascular disease.   Rate 141 bpm. ND interval 122 ms.  QRS duration 80 ms. QT/QTc 300/459 ms. P-R-T axes 43 65 47.   Interpretation: Sinus tachycardia, Otherwise normal ECG   Agree with computer interpretation. Yes    Interpreted at 0150 by Dr. Willoughby.      Imaging:  Radiology findings were communicated with the patient who voiced understanding of the findings.    CT Chest (PE) Abdomen Pelvis w Contrast:  IMPRESSION:   1.  No evidence of pulmonary embolus.   2.  Hepatic steatosis.   3.  Mild nonspecific distal esophageal thickening, decreased in interval  Per radiology.     Laboratory:  CBC: WBC 3.4 low, HGB 16.0 high, o/w WNL ()  CMP (2224):  low, Carbon dioxide 10 low, Anion gap 25 high, Albumin 5.3 high, Protein total 10.1 high,  high,  high, o/w WNL (Creatinine 0.65)    BMP (0134): Carbon dioxide 6 low, Anion gap 24 high, Calcium 8.0 low, o/w WNL (Creatinine 0.53)   Blood gas venous and oxyhgb (2321): pH 7.16 low, pCO2 29 low, pO2 43, Bicarbonate 10 low, FIO2 2L, Oxyhemoglobin 67, Base deficit 17.1   Blood gas venous and oxyhgb (0134): pH 7.08 low, pCO2 21 low, pO2 87 high, Bicarbonate 6 low, FIO2 room air, Oxyhemoglobin 93, Base deficit 22.1   Lactic acid whole blood (2300): 6.0 high   Lactic acid (0134): 6.1 high   Troponin I (2224): <0.015   Nt probnp inpatient: 16  Lipase: 213   HCG Qualitative Serum: Negative  Magnesium: 1.9   Alcohol ethyl: 0.11 high   Acetaminophen level: <2  Salicylate level: <2   Ketone beta-hydroxybutyrate quantitative: 6.4 high   Procalcitonin: Pending   Blood culture: Pending x2   Symptomatic COVID-19 Virus by PCR: Pending   UA with Microscopic: Urineketon >150, Small blood, Protein albumin 100, Mucous present, o/w Negative     Interventions:  2237 NS 1,000 mL IV   2302 Zofran 4 mg IV   2336 Zofran 4 mg IV   2343 Ativan 1 mg IV   0140 Folic acid 1 mg IV   0142 Thiamine 100 mg IV   0143 D5 and NS 1,000 mL IV   0238 Protonix 40 mg IV   0327 Sodium bicarbonate 150 mEq in D5 IV      Emergency Department  Course:  Nursing notes and vitals reviewed.  2313: I performed an exam of the patient as documented above.     2335: I updated and reassessed the patient.     0012: I updated and reassessed the patient.      0223: I spoke with Dr. Swenson of the intensive care service regarding patient's presentation, findings, and plan of care.     Findings and plan explained to the Patient who consents to admission. Discussed the patient with Dr. Swenson, who will admit the patient to an ICU bed for further monitoring, evaluation, and treatment.      Impression & Plan     CMS Diagnoses:  The Lactic acid level is elevated due to hypovolemia, retching/intractable vomiting, at this time there is no sign of severe sepsis or septic shock.     Medical Decision Making:  Patient presents to the ER for evaluation of nausea and vomiting over the past 2 days, recent hematemesis, shortness of breath, tachycardia.  On arrival patient has stable blood pressure and is afebrile but she is tachycardic in the 120s to 140s and up to the 180s, reportedly, when she is retching.    On exam she has dry mucous membranes, no focal neurological deficits, GCS of 15, moderate epigastric tenderness without peritoneal signs.    I reviewed her chart and see that she has a complex past medical history including admission in May of this year.  She was found to have sinus tachycardia which was thought to be multifactorial including acidosis, hypovolemia, possible alcohol withdrawal.  He was also found to have incidental reduced LV systolic function with an EF of 2025%.  This was thought to be due to stress-induced cardiomyopathy.  She was discharged with metoprolol, which patient has been taking but is not sure she has been keeping down during vomiting these past 2 days.  During that admission she also had lactic acidosis and ketoacidosis related to nausea and vomiting.  She also had some hematemesis that was thought to be secondary to Kayla-Edwards tear.  She had  transaminitis which thought could be related to alcohol use.    Today it appears that she has quite a similar presentation.  EKGs reveal sinus tachycardia.  There are no specific ischemic changes and a single troponin is negative.  VBG is markedly deranged with an anion gap metabolic acidosis.  Electrolytes not significantly deranged all there is mild hyponatremia.  She has dry mucous membranes and IV fluid bolus was initiated by given history of severe reduced LV function, further fluids after initial liter of normal saline were withheld while awaiting imaging of the chest to ensure that no pulmonary edema was developing.  Lactic acid was elevated at 6, ketones were elevated as well.  No history to support toxic alcohol ingestion or other overdose such as Tylenol or salicylate ingestion.  I suspect anion gap metabolic acidosis is due to profound dehydration/poor po intake with lactic acidosis and possibly starvation ketosis.  Again her tachycardia could be multifactorial including dehydration, acidosis, alcohol withdrawal, beta-blocker withdrawal.  She was given some Ativan for possible alcohol withdrawal although she did not have tremulousness or tongue fasciculations to suggest obvious alcohol withdrawal either. GALI was 0.11 as well.    CT of the chest and abdomen shows no acute pathology.  Repeat VBG and lactic acid after fluid bolus showed worsening metabolic acidosis and unchanged lactic.  Further IV fluids were given.  I spoke with intensivist with plans for bicarbonate infusion which was started in the ER.  Patient was admitted to the ICU for further cares.  Patient was admitted to the hospital for further management.    Critical Care time: 50  minutes     Diagnosis:    ICD-10-CM    1. Ketoacidosis  E87.2 Blood culture     Blood culture     UA with Microscopic     Blood gas venous and oxyhgb     Lactic acid     Basic metabolic panel     Symptomatic COVID-19 Virus (Coronavirus) by PCR     Acetaminophen level      Acetaminophen level     Salicylate level     Salicylate level     Procalcitonin     Procalcitonin     CANCELED: Acetaminophen level     CANCELED: Salicylate level     CANCELED: Procalcitonin     CANCELED: Creatinine   2. Lactic acidosis  E87.2    3. High anion gap metabolic acidosis  E87.2    4. Hematemesis with nausea  K92.0    5. Sinus tachycardia  R00.0    6. Alcohol use  Z72.89       Disposition:  Admitted to the ICU.       I, Jaylen Martinez, am serving as a scribe at 11:13 PM on 10/7/2020 to document services personally performed by Scotty Willoughby MD, based on my observations and the provider's statements to me.     [facility] EMERGENCY DEPT     Scotty Willoughby MD  10/08/20 0427       Scotty Willoughby MD  10/08/20 0427

## 2020-10-08 NOTE — PROVIDER NOTIFICATION
MD Notification    Notified Person: MD    Notified Person Name: Dr. Coffman    Notification Date/Time: 10/8/20 2043    Purpose of Notification: pH 7.12, HCO3 8    Orders Received: No new orders.

## 2020-10-09 ENCOUNTER — APPOINTMENT (OUTPATIENT)
Dept: PHYSICAL THERAPY | Facility: CLINIC | Age: 25
DRG: 922 | End: 2020-10-09
Attending: INTERNAL MEDICINE

## 2020-10-09 PROBLEM — E87.6 HYPOKALEMIA: Status: ACTIVE | Noted: 2020-10-09

## 2020-10-09 PROBLEM — F10.20 ALCOHOL DEPENDENCE, CONTINUOUS (H): Status: ACTIVE | Noted: 2020-10-09

## 2020-10-09 PROBLEM — Z78.9 ALCOHOL USE: Status: RESOLVED | Noted: 2020-10-08 | Resolved: 2020-10-09

## 2020-10-09 PROBLEM — K70.10 ALCOHOLIC HEPATITIS WITHOUT ASCITES (H): Status: ACTIVE | Noted: 2020-10-09

## 2020-10-09 PROBLEM — E83.39 HYPOPHOSPHATEMIA: Status: ACTIVE | Noted: 2020-10-09

## 2020-10-09 LAB
ACETAMINOPHEN QUAL: NEGATIVE
ALBUMIN SERPL-MCNC: 3.9 G/DL (ref 3.4–5)
ALP SERPL-CCNC: 57 U/L (ref 40–150)
ALT SERPL W P-5'-P-CCNC: 85 U/L (ref 0–50)
AMANTADINE: NEGATIVE
AMITRIPTYLINE QUAL: NEGATIVE
AMOXAPINE: NEGATIVE
AMPHETAMINES QUAL: NEGATIVE
ANION GAP SERPL CALCULATED.3IONS-SCNC: 8 MMOL/L (ref 3–14)
AST SERPL W P-5'-P-CCNC: 136 U/L (ref 0–45)
ATROPINE: NEGATIVE
BASE EXCESS BLDV CALC-SCNC: 0.3 MMOL/L
BASOPHILS # BLD AUTO: 0 10E9/L (ref 0–0.2)
BASOPHILS NFR BLD AUTO: 0.4 %
BENZODIAZ UR QL: NEGATIVE
BILIRUB SERPL-MCNC: 1 MG/DL (ref 0.2–1.3)
BUN SERPL-MCNC: 2 MG/DL (ref 7–30)
BUPROPION QUAL: NEGATIVE
CAFFEINE QUAL: POSITIVE
CALCIUM SERPL-MCNC: 8.1 MG/DL (ref 8.5–10.1)
CANNABINOIDS UR QL SCN: NEGATIVE
CARBAMAZEPINE QUAL: NEGATIVE
CHLORIDE SERPL-SCNC: 104 MMOL/L (ref 94–109)
CHLORPHENIRAMINE: NEGATIVE
CHLORPROMAZINE: NEGATIVE
CITALOPRAM QUAL: NEGATIVE
CLOMIPRAMINE QUAL: NEGATIVE
CO2 SERPL-SCNC: 24 MMOL/L (ref 20–32)
COCAINE QUAL: NEGATIVE
COCAINE UR QL: NEGATIVE
CODEINE QUAL: NEGATIVE
CREAT SERPL-MCNC: 0.54 MG/DL (ref 0.52–1.04)
DESIPRAMINE QUAL: NEGATIVE
DEXTROMETHORPHAN: NEGATIVE
DIFFERENTIAL METHOD BLD: ABNORMAL
DIPHENHYDRAMINE: POSITIVE
DOXEPIN/METABOLITE: NEGATIVE
DOXYLAMINE: NEGATIVE
EOSINOPHIL # BLD AUTO: 0 10E9/L (ref 0–0.7)
EOSINOPHIL NFR BLD AUTO: 1.1 %
EPHEDRINE OR PSEUDO: NEGATIVE
ERYTHROCYTE [DISTWIDTH] IN BLOOD BY AUTOMATED COUNT: 12.5 % (ref 10–15)
FENTANYL QUAL: NEGATIVE
FLUOXETINE AND METAB: NEGATIVE
GFR SERPL CREATININE-BSD FRML MDRD: >90 ML/MIN/{1.73_M2}
GLUCOSE BLDC GLUCOMTR-MCNC: 106 MG/DL (ref 70–99)
GLUCOSE BLDC GLUCOMTR-MCNC: 115 MG/DL (ref 70–99)
GLUCOSE BLDC GLUCOMTR-MCNC: 116 MG/DL (ref 70–99)
GLUCOSE SERPL-MCNC: 103 MG/DL (ref 70–99)
HCO3 BLDV-SCNC: 25 MMOL/L (ref 21–28)
HCT VFR BLD AUTO: 38.3 % (ref 35–47)
HGB BLD-MCNC: 13.4 G/DL (ref 11.7–15.7)
HYDROCODONE QUAL: NEGATIVE
HYDROMORPHONE QUAL: NEGATIVE
IBUPROFEN QUAL: NEGATIVE
IMIPRAMINE QUAL: NEGATIVE
IMM GRANULOCYTES # BLD: 0 10E9/L (ref 0–0.4)
IMM GRANULOCYTES NFR BLD: 0.4 %
INTERPRETATION ECG - MUSE: NORMAL
KETAMINE QUAL: NEGATIVE
LAMOTRIGINE QUAL: NEGATIVE
LIDOCAIN SPEC QL: NEGATIVE
LOXAPINE: NEGATIVE
LYMPHOCYTES # BLD AUTO: 1.2 10E9/L (ref 0.8–5.3)
LYMPHOCYTES NFR BLD AUTO: 43.4 %
MAGNESIUM SERPL-MCNC: 2 MG/DL (ref 1.6–2.3)
MAPROTYLINE: NEGATIVE
MCH RBC QN AUTO: 33.1 PG (ref 26.5–33)
MCHC RBC AUTO-ENTMCNC: 35 G/DL (ref 31.5–36.5)
MCV RBC AUTO: 95 FL (ref 78–100)
MDMA QUAL: NEGATIVE
MEPERIDINE QUAL: NEGATIVE
METHAMPHETAMINE: NEGATIVE
METHODONE QUAL: NEGATIVE
MIRTAZAPINE QUAL: NEGATIVE
MONOCYTES # BLD AUTO: 0.2 10E9/L (ref 0–1.3)
MONOCYTES NFR BLD AUTO: 6.8 %
MORPHINE QUAL: NEGATIVE
NEUTROPHILS # BLD AUTO: 1.3 10E9/L (ref 1.6–8.3)
NEUTROPHILS NFR BLD AUTO: 47.9 %
NICOTINE: NEGATIVE
NORTRIPTYLINE QUAL: NEGATIVE
NRBC # BLD AUTO: 0 10*3/UL
NRBC BLD AUTO-RTO: 0 /100
O2/TOTAL GAS SETTING VFR VENT: ABNORMAL %
OLANZAPINE QUAL: NEGATIVE
OPIATES UR QL SCN: NEGATIVE
OXYCODONE QUAL: NEGATIVE
OXYHGB MFR BLDV: 35 %
PCO2 BLDV: 39 MM HG (ref 40–50)
PENTAZOCINE: NEGATIVE
PH BLDV: 7.41 PH (ref 7.32–7.43)
PHENCYCLIDINE QUAL: NEGATIVE
PHENTERMINE: NEGATIVE
PHOSPHATE SERPL-MCNC: <0.1 MG/DL (ref 2.5–4.5)
PLATELET # BLD AUTO: 157 10E9/L (ref 150–450)
PO2 BLDV: 18 MM HG (ref 25–47)
POTASSIUM SERPL-SCNC: 2.7 MMOL/L (ref 3.4–5.3)
POTASSIUM SERPL-SCNC: 3.7 MMOL/L (ref 3.4–5.3)
PROPOFOL QUAL: NEGATIVE
PROPOXPHENE QUAL: NEGATIVE
PROPRANOLOL QUAL: NEGATIVE
PROT SERPL-MCNC: 7.5 G/DL (ref 6.8–8.8)
PYRILAMINE: NEGATIVE
QUETIAPINE METAB QUAL: NEGATIVE
RBC # BLD AUTO: 4.05 10E12/L (ref 3.8–5.2)
SALICYLATE QUAL: NEGATIVE
SERTRALINE QUAL: NEGATIVE
SODIUM SERPL-SCNC: 136 MMOL/L (ref 133–144)
THEOBROMINE: POSITIVE
TOPIRAMATE QUAL: NEGATIVE
TRAMADOL QUAL: NEGATIVE
TRIMIPRAMINE QUAL: NEGATIVE
VENLAFAXINE QUAL: NEGATIVE
WBC # BLD AUTO: 2.8 10E9/L (ref 4–11)

## 2020-10-09 PROCEDURE — 84100 ASSAY OF PHOSPHORUS: CPT | Performed by: INTERNAL MEDICINE

## 2020-10-09 PROCEDURE — 250N000013 HC RX MED GY IP 250 OP 250 PS 637: Performed by: INTERNAL MEDICINE

## 2020-10-09 PROCEDURE — 120N000001 HC R&B MED SURG/OB

## 2020-10-09 PROCEDURE — 84132 ASSAY OF SERUM POTASSIUM: CPT | Performed by: INTERNAL MEDICINE

## 2020-10-09 PROCEDURE — C9113 INJ PANTOPRAZOLE SODIUM, VIA: HCPCS | Performed by: INTERNAL MEDICINE

## 2020-10-09 PROCEDURE — 96372 THER/PROPH/DIAG INJ SC/IM: CPT | Performed by: INTERNAL MEDICINE

## 2020-10-09 PROCEDURE — 83735 ASSAY OF MAGNESIUM: CPT | Performed by: INTERNAL MEDICINE

## 2020-10-09 PROCEDURE — 250N000011 HC RX IP 250 OP 636: Performed by: INTERNAL MEDICINE

## 2020-10-09 PROCEDURE — 99232 SBSQ HOSP IP/OBS MODERATE 35: CPT | Performed by: INTERNAL MEDICINE

## 2020-10-09 PROCEDURE — 258N000003 HC RX IP 258 OP 636: Performed by: HOSPITALIST

## 2020-10-09 PROCEDURE — 85025 COMPLETE CBC W/AUTO DIFF WBC: CPT | Performed by: INTERNAL MEDICINE

## 2020-10-09 PROCEDURE — 82805 BLOOD GASES W/O2 SATURATION: CPT | Performed by: INTERNAL MEDICINE

## 2020-10-09 PROCEDURE — 99221 1ST HOSP IP/OBS SF/LOW 40: CPT | Mod: 95 | Performed by: PSYCHIATRY & NEUROLOGY

## 2020-10-09 PROCEDURE — 80053 COMPREHEN METABOLIC PANEL: CPT | Performed by: INTERNAL MEDICINE

## 2020-10-09 PROCEDURE — 258N000003 HC RX IP 258 OP 636: Performed by: INTERNAL MEDICINE

## 2020-10-09 PROCEDURE — 97161 PT EVAL LOW COMPLEX 20 MIN: CPT | Mod: GP

## 2020-10-09 PROCEDURE — 36415 COLL VENOUS BLD VENIPUNCTURE: CPT | Performed by: INTERNAL MEDICINE

## 2020-10-09 PROCEDURE — 999N001017 HC STATISTIC GLUCOSE BY METER IP

## 2020-10-09 PROCEDURE — 250N000009 HC RX 250: Performed by: HOSPITALIST

## 2020-10-09 PROCEDURE — 250N000011 HC RX IP 250 OP 636: Performed by: HOSPITALIST

## 2020-10-09 PROCEDURE — 250N000013 HC RX MED GY IP 250 OP 250 PS 637: Performed by: HOSPITALIST

## 2020-10-09 RX ORDER — POTASSIUM CHLORIDE 1500 MG/1
40 TABLET, EXTENDED RELEASE ORAL EVERY 4 HOURS
Status: COMPLETED | OUTPATIENT
Start: 2020-10-09 | End: 2020-10-09

## 2020-10-09 RX ORDER — TRAZODONE HYDROCHLORIDE 50 MG/1
50 TABLET, FILM COATED ORAL AT BEDTIME
Status: DISCONTINUED | OUTPATIENT
Start: 2020-10-09 | End: 2020-10-10 | Stop reason: HOSPADM

## 2020-10-09 RX ORDER — PANTOPRAZOLE SODIUM 40 MG/1
40 TABLET, DELAYED RELEASE ORAL
Status: DISCONTINUED | OUTPATIENT
Start: 2020-10-10 | End: 2020-10-10 | Stop reason: HOSPADM

## 2020-10-09 RX ORDER — LEVETIRACETAM 500 MG/1
1000 TABLET ORAL 2 TIMES DAILY
Status: DISCONTINUED | OUTPATIENT
Start: 2020-10-09 | End: 2020-10-10 | Stop reason: HOSPADM

## 2020-10-09 RX ADMIN — DEXTROSE AND SODIUM CHLORIDE: 5; 900 INJECTION, SOLUTION INTRAVENOUS at 13:54

## 2020-10-09 RX ADMIN — DEXTROSE AND SODIUM CHLORIDE: 5; 900 INJECTION, SOLUTION INTRAVENOUS at 04:11

## 2020-10-09 RX ADMIN — METOPROLOL TARTRATE 12.5 MG: 25 TABLET, FILM COATED ORAL at 21:33

## 2020-10-09 RX ADMIN — TRAZODONE HYDROCHLORIDE 50 MG: 50 TABLET ORAL at 21:33

## 2020-10-09 RX ADMIN — POTASSIUM PHOSPHATE, MONOBASIC AND POTASSIUM PHOSPHATE, DIBASIC 25 MMOL: 224; 236 INJECTION, SOLUTION, CONCENTRATE INTRAVENOUS at 16:02

## 2020-10-09 RX ADMIN — LEVETIRACETAM 1000 MG: 500 TABLET ORAL at 21:33

## 2020-10-09 RX ADMIN — MULTIPLE VITAMINS W/ MINERALS TAB 1 TABLET: TAB at 08:41

## 2020-10-09 RX ADMIN — POTASSIUM PHOSPHATE, MONOBASIC AND POTASSIUM PHOSPHATE, DIBASIC 25 MMOL: 224; 236 INJECTION, SOLUTION, CONCENTRATE INTRAVENOUS at 11:19

## 2020-10-09 RX ADMIN — FOLIC ACID 1 MG: 1 TABLET ORAL at 08:41

## 2020-10-09 RX ADMIN — LEVETIRACETAM 1000 MG: 10 INJECTION INTRAVENOUS at 11:19

## 2020-10-09 RX ADMIN — METOPROLOL TARTRATE 12.5 MG: 25 TABLET, FILM COATED ORAL at 08:41

## 2020-10-09 RX ADMIN — ENOXAPARIN SODIUM 40 MG: 40 INJECTION SUBCUTANEOUS at 04:11

## 2020-10-09 RX ADMIN — POTASSIUM CHLORIDE 40 MEQ: 1500 TABLET, EXTENDED RELEASE ORAL at 08:48

## 2020-10-09 RX ADMIN — THIAMINE HYDROCHLORIDE 100 MG: 100 INJECTION, SOLUTION INTRAMUSCULAR; INTRAVENOUS at 08:41

## 2020-10-09 RX ADMIN — PANTOPRAZOLE SODIUM 40 MG: 40 INJECTION, POWDER, FOR SOLUTION INTRAVENOUS at 08:48

## 2020-10-09 RX ADMIN — POTASSIUM CHLORIDE 40 MEQ: 1500 TABLET, EXTENDED RELEASE ORAL at 13:21

## 2020-10-09 ASSESSMENT — ACTIVITIES OF DAILY LIVING (ADL)
ADLS_ACUITY_SCORE: 10

## 2020-10-09 NOTE — CONSULTS
Care Management Initial Consult    General Information  Assessment completed with:: Patient,    Type of CM/SW Visit: Initial Assessment  Primary Care Provider verified and updated as needed?: No  Readmission Within the Last 30 Days: no previous admission in last 30 days        Advance Care Planning:  NA          Communication Assessment  Patient's communication style: spoken language (English or Bilingual)  Hearing Difficulty or Deaf: no Wear Glasses or Blind: no    Cognitive  Cognitive/Neuro/Behavioral: WDL                      Living Environment:   People in home: significant other     Current living Arrangements: apartment          Family/Social Support:  Care provided by:    Provides care for:    Marital Status:   Who is your support system?: Parent(s), Other (specify)(Significant other)     Description of Support System: Supportive  Description of Support System: Supportive           Description of Support System: Supportive       Current Resources:   Community Resources: NA  Equipment currently used at home: none       Employment:  Employment Status: employed full-time        Financial/Environmental Concerns: insurance, none  Referral to Financial Counselor: Yes       Lifestyle & Psychosocial Needs:        Socioeconomic History     Marital status: Single     Spouse name: Not on file     Number of children: Not on file     Years of education: Not on file     Highest education level: Not on file     Tobacco Use     Smoking status: Never Smoker     Smokeless tobacco: Never Used   Substance and Sexual Activity     Alcohol use: Yes     Comment: 1-2/week     Drug use: No       Functional Status:  Prior to admission patient needed assistance:  NA        Mental Health Status:  WDL                            Values/Beliefs:  Spiritual, Cultural Beliefs, Congregation Practices, Values that affect care: NA                Additional Information:  Hx Lymphoma age [age] in remission; had an Oncologist at [facility],  currently does not have an Oncologist //Cardiomyopathy from chemo; did not follow-up with virtual appointment 2020, does not currently have a Cardiologist.  She reports both a sister an grandmother  in 2019.  She feels she still may have some coping difficulty with this.  She currently does not have a therapist.  NO PCP and no insurance.  The Financial Counselor has conversed with pt.  She is not eligible for assistance.  She may be eligible for [employer] [facility] that would help with expense of hospitalization, but not follow-up specialty care. Hopefully, she will be able to get insurance coverage thru her employer in the near future to initiate care with Cardiology.  [facility]      Dianne Oswald RN

## 2020-10-09 NOTE — CONSULTS
Patient seen for initial psychiatric consultation. Refer to dictated note.    Félix Cardenas, DO

## 2020-10-09 NOTE — PLAN OF CARE
Summary:   Transfer from ICU on 10/8; admit for SOB + emisis  DATE & TIME: 10/8 0302-5585   Cognitive Concerns/ Orientation : AOx4, sleepy but arrousable   BEHAVIOR & AGGRESSION TOOL COLOR: green  CIWA SCORE: 0   ABNL VS/O2: VSS on RA ex low grade temp of 99  MOBILITY: Up SBA  PAIN MANAGMENT: denied pain, tylenol available PRN; denies nausea  DIET: clears, tolerating well  BOWEL/BLADDER: continent  ABNL LAB/BG: Ketones 2.9  DRAIN/DEVICES: PIV infusing D5NS  TELEMETRY RHYTHM:   SKIN: WDL  TESTS/PROCEDURES: n/a  D/C DAY/GOALS/PLACE: pending  OTHER IMPORTANT INFO:

## 2020-10-09 NOTE — PROGRESS NOTES
"CLINICAL NUTRITION SERVICES  -  ASSESSMENT NOTE      Recommendations Ordered by Registered Dietitian (RD):   - ADAT, encouraging oral intake with higher calorie, higher protein focus  - Boost Breeze 10 am daily  - Continue micronutrient supplementation    Future/Additional Recommendations:   - Will reassess need for diet education/resources and further protein supplements (pending diet advancement) at follow-up    Malnutrition:   % Weight Loss:  None noted  % Intake:  </= 50% for >/= 5 days (severe malnutrition) (suspected)   Subcutaneous Fat Loss:  Unable to fully determine  Muscle Loss:  Unable to fully determine  Fluid Retention:  None noted    Malnutrition Diagnosis: Unable to determine due to lack of complete nutrition-focused physical exam        REASON FOR ASSESSMENT  Darya Hamilton is a 25 year old female seen by Registered Dietitian for Admission Nutrition Risk Screen for reduced oral intake over the last month      NUTRITION HISTORY  - Information obtained from chart review:  - PMH: alcohol use, seizure disorder, Nonischemic cardiomyopathy and with a EF in the range of 20 to 30%,   - MD records patient admits with severe starvation ketoacidosis and severe protein energy malnutrition in H&P  - Reported to have hematemesis and retching PTA  - No known food allergies     - Per discussion with patient this morning, reports that she eats fine at home and just is a picky eater. She says she works all day and usually snacks at work, getting plenty to eat, \"theres always stuff available\"  - Denies recent changes in appetite and intake to writer today  - Denies recent wt loss and reports usual body wt is between 120-130#       CURRENT NUTRITION ORDERS  Diet Order:     CLD    Current Intake/Tolerance:  Visited patient at bedside today   She reports that she is feeling fine and has an appetite. Had juice and broth this morning which was tolerated. Willing to trial Boost Breeze (clear) for added protein  Denies " "any other nutritional concerns at this time    MD = Concern for eating disorder  There was some report of witnessing patient trying to induce vomiting in the ICU.  -Consult to psychiatry has been placed (see below)     Psychiatry consult this AM --> \"I inquired about observations of induction of emesis which she denies. She reports she's just been pushing on her jaw due to pain. She tells me she has no history of an eating disorder. She reveals that her family has expressed worry as \"I only eat a tad bit, I have a tummy like a bird.\" She doesn't have any estimate of her daily calorie intake.\"     Unable to discuss further nutritional concerns with patient at this time as MD enters room for assessment     NUTRITION FOCUSED PHYSICAL ASSESSMENT FOR DIAGNOSING MALNUTRITION)  Brief visual assessment complete         Observed:    Unable to fully determine at this time --> needs more complete assessment at follow-up     Obtained from Chart/Interdisciplinary Team:  Nutrition Reji --> 3  Total Reji --> 21    ANTHROPOMETRICS  Height: 5' 3\"   Weight: 123 lbs 4.8 oz (55.9 kg)  Body mass index is 21.85 kg/m .  Weight Status:  Normal BMI  IBW: 52.3 kg  % IBW: 106%  Weight History:   Wt Readings from Last 10 Encounters:   10/09/20 55.9 kg (123 lb 4.8 oz)   05/06/20 55 kg (121 lb 4.8 oz)   08/07/19 56.7 kg (125 lb)   04/16/19 56.7 kg (125 lb)   06/26/18 59 kg (130 lb)       LABS  Labs reviewed  Lab Results   Component Value Date    URINEKETONE >150 10/08/2020     Recent Labs   Lab 10/09/20  0757 10/09/20  0747 10/09/20  0204 10/08/20  2143 10/08/20  1413 10/08/20  0911 10/08/20  0456 10/08/20  0134 10/07/20  2224   GLC  --  103*  --   --  176* 219*  --  93 96   *  --  116* 158*  --   --  216*  --   --        MEDICATIONS  Medications reviewed  Folic acid  Thera-vit-M  D5 IVF at 100 mL/hr = 408 kcal/day     ASSESSED NUTRITION NEEDS PER APPROVED PRACTICE GUIDELINES:    Dosing Weight 55.9 kg - current   Estimated Energy " Needs: 0644-0738 kcals (25-30 Kcal/Kg)  Justification: maintenance  Estimated Protein Needs: 68-84 grams protein (1.2-1.5 g pro/Kg)  Justification: preservation of lean body mass  Estimated Fluid Needs: >/=1 mL/kcal  Justification: maintenance    MALNUTRITION:  % Weight Loss:  None noted  % Intake:  </= 50% for >/= 5 days (severe malnutrition) (suspected)   Subcutaneous Fat Loss:  Unable to fully determine  Muscle Loss:  Unable to fully determine  Fluid Retention:  None noted    Malnutrition Diagnosis: Unable to determine due to lack of complete nutrition-focused physical exam      NUTRITION DIAGNOSIS:  Inadequate oral intake related to restricted diet order as evidenced by intake of clears only over the past 3 days this admission       NUTRITION INTERVENTIONS  Recommendations / Nutrition Prescription  ADAT, encouraging oral intake with higher calorie, higher protein focus  Boost Breeze 10 am daily  Continue micronutrient supplementation       Implementation  Nutrition education: Per Provider order if indicated   Medical Food Supplement: as above       Nutrition Goals  Diet tolerance >FL within 24 hrs   Pt to consume >50% meals TID      MONITORING AND EVALUATION:  Progress towards goals will be monitored and evaluated per protocol and Practice Guidelines          Rola Reeves RD, LD  Clinical Dietitian

## 2020-10-09 NOTE — PROGRESS NOTES
10/09/20 0818   Quick Adds   Type of Visit Initial PT Evaluation   Living Environment   People in home significant other   Current Living Arrangements apartment   Home Accessibility stairs to enter home   Number of Stairs, Main Entrance 5   Stair Railings, Main Entrance railings on both sides of stairs   Self-Care   Usual Activity Tolerance excellent   Current Activity Tolerance moderate   Equipment Currently Used at Home none   Activity/Exercise/Self-Care Comment IND with functional mobility at baseline   Disability/Function   Fall history within last six months no   General Information   Onset of Illness/Injury or Date of Surgery 10/09/20   Referring Physician Panchito Swenson MD   Patient/Family Therapy Goals Statement (PT) To return to home   Pertinent History of Current Problem (include personal factors and/or comorbidities that impact the POC) per chart: 25-year-old female admitted late evening of 10/7/2022 ICU for shortness of breath.  She presented to ED for evaluation of shortness of breath and ongoing emesis with possible hematemesis times few days.     General Observations Greeted patient  watching TV supine in bed.   Cognition   Orientation Status (Cognition) person;place  (knows month & year)   Affect/Mental Status (Cognition) WFL   Follows Commands (Cognition) WFL   Pain Assessment   Patient Currently in Pain No   Integumentary/Edema   Integumentary/Edema no deficits were identifed   Posture    Posture Not impaired   Range of Motion (ROM)   ROM Comment B LE WFL   Strength   Strength Comments B LE WFL   Bed Mobility   Comment (Bed Mobility) supine <> EOB at IND   Transfers   Transfer Safety Comments sit <> stand without assistive device at IND   Gait/Stairs (Locomotion)   Comment (Gait/Stairs) 200 ft without assistive device at SBA; NBOS. Ascending/descends 2x4 steps using a single railing reciprocal pattern at SBA.   Balance   Balance Comments Engaged patient in dynamic balance challenges during  "ambulation including head turns, change of speed, abrupt stopping, side stepping and kareoke stepping - no overt LOB noted, mild unsteadiness with kareoke stepping.    Sensory Examination   Sensory Perception Comments denies numbness/tingling   Clinical Impression   Criteria for Skilled Therapeutic Intervention evaluation only   Clinical Presentation Stable/Uncomplicated  (VSS during session)   Clinical Presentation Rationale PMH, curruent presentation, clinical judgement   Clinical Decision Making (Complexity) low complexity   Risk & Benefits of therapy have been explained patient;evaluation/treatment results reviewed   PT Discharge Planning    PT Discharge Recommendation (DC Rec) home   PT Rationale for DC Rec Patient appears at or near baseline for functional mobility. SBA/IND with bed mobility, transfers, ambulation & stair negotiation. No overt balance issues noted with dynamic balance challenges. Will sign off & complete orders.   [facility] [employer] TM \"6 Clicks\"   2016, Trustees of [facility], under license to [employer].  All rights reserved.   6 Clicks Short Forms Basic Mobility Inpatient Short Form   [facility] [employer]  \"6 Clicks\" V.2 Basic Mobility Inpatient Short Form   1. Turning from your back to your side while in a flat bed without using bedrails? 4 - None   2. Moving from lying on your back to sitting on the side of a flat bed without using bedrails? 4 - None   3. Moving to and from a bed to a chair (including a wheelchair)? 4 - None   4. Standing up from a chair using your arms (e.g., wheelchair, or bedside chair)? 4 - None   5. To walk in hospital room? 4 - None   6. Climbing 3-5 steps with a railing? 4 - None   Basic Mobility Raw Score (Score out of 24.Lower scores equate to lower levels of function) 24   Total Evaluation Time   Total Evaluation Time (Minutes) 15     "

## 2020-10-09 NOTE — CONSULTS
10/9/20 chem dep consult acknowledged.      Per EHR, patient has no listed funding so would need to apply for Rule 25 funding form county of residence which appears to be [facility] based on her registration.  I emailed the application paperwork to the unit  and CTC, requesting they provide to patient prior to being seen, if patient is interested in seeking services.     Breanna Jonas, [facility]  [phone]

## 2020-10-09 NOTE — PROGRESS NOTES
[facility]    Hospitalist Progress Note    Date of Admission:  10/7/2020    Assessment & Plan     Interim summary    25-year-old female admitted 10/7/2022 ICU for shortness of breath with emesis with possible hematemesis for few days, with history of SVT, nonischemic cardiomyopathy with EF of 20-25% in May 2020, seizure disorder, h/o empyema vs hemothorax in 2016, alcohol use disorder, and h/o Hodgkin's lymphoma for which she received chemotherapy few years ago and may have caused her cardiomyopathy.  Had sinus tachycardia 140 - 160s, high anion gap metabolic acidosis with venous pH 7.1, bicarb 6, lactate 6.0, negative troponin, alcohol level 0.11, ketones 6.4.  She was aggressively resuscitated with IV fluids, IV bicarb drip, received IV Ativan for agitation.        Starvation ketoacidosis and lactic acidosis secondary to profound dehydration  Intractable nausea and vomiting  Sinus tachycardia, improved  Shortness of breath, improved  Hyponatremia, resolved  Acidosis has improved with aggressive IVF resuscitation.  Last venous pH 7.29, serum bicarb 16, ketones 2.9, lactate 1.6.  No evidence of infection with blood cultures NGTD and negative procalcitonin.  Continues to have nausea.  Unclear etiology for her significant nausea and vomiting.  -Transfer out of ICU to medical bed  -Continue IV D5 NS at 100 cc/h  -Continue telemetry monitoring  -Replacement protocols for potassium, magnesium and phosphorus  -If persistent nausea, consider GI consult for EGD, ? Gastroparesis  -PRN IV antiemetics, Tylenol    Nonischemic cardiomyopathy  H/o SVT, symptomatic ventricular tachycardia  First noted in May 2020 with EF 20 to 25%, felt to be stress related CM.  Could also be secondary to ? Chemotherapy for Hodgkin's lymphoma.  No available records for this.  TTE repeated on 10/8 shows improved EF to 46% with mild global hypokinesis and no significant valvular abnormalities.  This would more likely  support diagnosis of stress-induced cardiomyopathy in May.  Per ICU physician report, cardiology was contacted here and they did not make any new recommendations.  No note available.  -Unclear how compliant she has been with metoprolol use.  -12.5 mg twice daily metoprolol has been resumed today.  -Be cautious with IVF given CM.  -Monitor on telemetry    Alcohol use disorder  Patient denies drinking daily.  States that she has been trying to cut back on alcohol intake since her admission in May.  Reports last alcohol intake about 6 days prior however blood alcohol level was 0.11.  Not sure how reliable her history is.  -Consult to chemical dependency  -Monitor for alcohol withdrawal.  -CIWA scoring with symptom triggered Ativan  -Continue IV thiamine and oral multivitamin    Hematemesis  Limited hematemesis noted by patient most likely Kayla-Edwards tear secondary to severe retching.  Hemoglobin at 13.4.  No report of hematemesis in ICU.  -Monitor Hb  -Continue IV PPI    Transaminitis  Likely due to intractable nausea and vomiting.  Improving with IVF.  Lipase normal.  Hepatic steatosis noted on CT.  -Monitor LFTs intermittently    Seizure disorder  Patient reports inconsistent use of Keppra.  States last seizure episode was earlier this year.  -Resumed twice daily IV Keppra given ongoing nausea.    Severe protein calorie malnutrition  -Consult nutrition  -Clear liquid diet at this point.  Advance as tolerated.    Concern for eating disorder  There was some report of witnessing patient trying to induce vomiting in the ICU.  -Consult to psychiatry has been placed.  Pending.    History of empyema versus hemothorax   Reports that she was hospitalized at [facility] in [place] for 1 month, required blood transfusion during that hospitalization though cannot provide further details.  This was prior to her moving to [place] in 2017.      Hx of Hodgkin's lymphoma status post chemotherapy     Plan -- advance  diet, possible home tomorrow.     Travon Howe MD  Text Page (7am - 6pm, M-F)      Interval History   Nausea better, tolerating clear liquids.  She doesn't think she has a drinking problem, but doesn't want to argue it.     Physical Exam   Temp: 99.3  F (37.4  C) Temp src: Oral BP: 120/88 Pulse: 112   Resp: 16 SpO2: 97 % O2 Device: None (Room air)    Vitals:    10/07/20 2220 10/08/20 0453 10/09/20 0500   Weight: 56.7 kg (125 lb) 54.7 kg (120 lb 9.5 oz) 55.9 kg (123 lb 4.8 oz)     Vital Signs with Ranges  Temp:  [98  F (36.7  C)-99.3  F (37.4  C)] 99.3  F (37.4  C)  Pulse:  [100-126] 112  Resp:  [16-20] 16  BP: (103-125)/() 120/88  SpO2:  [97 %-100 %] 97 %  I/O last 3 completed shifts:  In: 3191 [P.O.:720; I.V.:2471]  Out: -     Constitutional: Appears comfortable  Respiratory: Non labored breathing, clear to auscultation bilaterally, no crackles or wheezes  Cardiovascular: Heart sounds regular rate and rhythm, tachycardic, no murmurs, no leg edema  GI: Abdomen is soft, non distended, non-tender. Normal BS  Skin/Integumen: no rashes, no pressure sores  Neuro: alert, Ox3  Psych: mood and affect appropriate      Medications     dextrose 5% and 0.9% NaCl 75 mL/hr at 10/09/20 1354       levETIRAcetam  1,000 mg Oral BID     metoprolol tartrate  12.5 mg Oral BID     [START ON 10/10/2020] pantoprazole  40 mg Oral QAM AC     potassium phosphate (KPHOS) in D5W IV  25 mmol Intravenous Q4H     thiamine  100 mg Intravenous Daily     traZODone  50 mg Oral At Bedtime       Data   Recent Labs   Lab 10/09/20  0747 10/08/20  1413 10/08/20  0911 10/07/20  2224 10/07/20  2224   WBC 2.8*  --  4.3  --  3.4*   HGB 13.4  --  13.4  --  16.0*   MCV 95  --  99  --  97     --  240  --  306    135 131*   < > 132*   POTASSIUM 2.7* 3.8 4.3   < > 3.6   CHLORIDE 104 108 104   < > 97   CO2 24 16* 10*   < > 10*   BUN 2* 4* 4*   < > 10   CR 0.54 0.52 0.61   < > 0.65   ANIONGAP 8 11 17*   < > 25*   DOONVAN 8.1* 7.6* 7.4*   < >  9.0   * 176* 219*   < > 96   ALBUMIN 3.9  --  4.7  --  5.3*   PROTTOTAL 7.5  --  8.7  --  10.1*   BILITOTAL 1.0  --  0.9  --  1.0   ALKPHOS 57  --  69  --  88   ALT 85*  --  97*  --  114*   *  --  150*  --  185*   LIPASE  --   --   --   --  213   TROPI  --   --  <0.015  --  <0.015    < > = values in this interval not displayed.       Imaging  No results found for this or any previous visit (from the past 24 hour(s)).

## 2020-10-09 NOTE — PLAN OF CARE
DATE & TIME: 10/9/2020 4282-0951   Cognitive Concerns/ Orientation : A & O x 4   BEHAVIOR & AGGRESSION TOOL COLOR: Green  CIWA SCORE: discontinued.   ABNL VS/O2: Slightly tachy 114 (metoprolol given) HR down to 90's. Otherwise VSS on RA.  MOBILITY: Up independently in room, ambulation encouraged.  PAIN MANAGMENT: Denies  DIET: Clear liquids  BOWEL/BLADDER: Continent of B & B  ABNL LAB/BG: /106. Ketones 2.9. K+ 2.7, replaced PO, recheck 1730. Phos critical >0.1 replaced PIV x2. Recheck in AM.  DRAIN/DEVICES: PIV infusing D5 in NS at 100.  TELEMETRY RHYTHM: Discontinued.  SKIN: Intact  TESTS/PROCEDURES: NA  D/C DAY/GOALS/PLACE: Possible discharge to home tomorrow 10/10 pending electrolyte checks in AM.  OTHER IMPORTANT INFO: PIV Keppra. PT/OT/CD following. Nursing will continue to monitor.   MD/RN ROUNDING SIGNED OFF D/E SHIFT: YES  COMMIT TO SIT DONE AND SIGNED OFF Completed

## 2020-10-09 NOTE — PLAN OF CARE
DATE & TIME: 10/8/2020 3065-6269    Cognitive Concerns/ Orientation : A & O x 4   BEHAVIOR & AGGRESSION TOOL COLOR: Green  CIWA SCORE: 0 & 0   ABNL VS/O2: Slightly tachy at 102, otherwise VSS on RA  MOBILITY: SBA  PAIN MANAGMENT: Denies  DIET: Clear liquids  BOWEL/BLADDER: Continent of B & B  ABNL LAB/BG: , ketones 2.9  DRAIN/DEVICES: PIV infusing D5 NS at 100 mL/h  TELEMETRY RHYTHM: Sinus rhythm  SKIN: Intact  TESTS/PROCEDURES: NA  D/C DAY/GOALS/PLACE: TBD  OTHER IMPORTANT INFO: GO KNOWLES/RN ROUNDING SIGNED OFF D/E SHIFT: NA  COMMIT TO SIT DONE AND SIGNED OFF Completed

## 2020-10-09 NOTE — PLAN OF CARE
OT: Orders received. Chart reviewed.  OT not indicated due to independence w/ ambulation, ADLs, and IADLs. Per PT, pt w/o cognitive concerns and is safe to return when medically safe to discharge.  Defer discharge recommendations to PT.  Will complete orders.

## 2020-10-09 NOTE — CONSULTS
"[facility]    PSYCHIATRIC CONSULTATION     Date of Admission:  10/7/2020  Requesting Provider:    HPI:  Darya Hamilton is a 25 year old female with a history of possible EtOH abuse, seizure disorder, Hodgkin's lymphoma, and non-ischemic cardiomyopathy that presented to the ED for evaluation of N/V with hematemesis for the preceding day prior to presentation. Patient was found to be tachycardic and anxious in the ED and labs revealed severe metabolic acidosis and an EtOH level of 0.11 in spite of endorsements of not having drank in four days (at a family gathering). She was given 1 mg of Ativan and anxiety and tachycardia improved and initiated aggressive volume resuscitation and bicarb drip. The primary service has found difficulty in getting clarity from the patient on her drinking patterns and have further observed concerns for forced emesis, which may be impeding treatment and stabilization of her acidosis.     She affirms she's been having issues with emesis, hard to keep anything down. She notes she's had these symptoms before on a single occasion in May and was told they were wondering if it was related to EtOH withdrawal vs consequent to her heart condition (cardiomyopathy). She was advised to \"cut that out of my life a little bit\" in reference to her alcohol as it could interfere with her medications and her heart health (notes she was a social drinker rather than a daily drinker - would drink hard EtOH with friends). She tells me she's adherered to those recommendations save for having a glass of wine with her family four days prior to admission. I inquired about her positive EtOH level and she came to no conclusion, consistently denying any alcohol use other than 4 days prior to admission.    I inquired about observations of induction of emesis which she denies. She reports she's just been pushing on her jaw due to pain. She tells me she has no history of an eating disorder. " "She reveals that her family has expressed worry as \"I only eat a tad bit, I have a tummy like a bird.\" She doesn't have any estimate of her daily calorie intake.     She notes she briefly saw a therapist as a teen to assist with family issues and her dx of cancer. Never saw a psychiatrist. Never been psychiatrically hospitalized. Never participated in CD tx. She ultimately voices no psychiatric complaints to me today whatsoever.      PAST MEDICAL HISTORY:  Past Medical History:   Diagnosis Date     HFrEF (heart failure with reduced ejection fraction) (H) 05/2020    EF 20-25%, likely stress-induced cardiomyopathy     Hodgkin's lymphoma (H)     in remission - 2011     Seizures (H)        FAMILY HISTORY:  No family history on file.    SOCIAL HISTORY:  Social History     Socioeconomic History     Marital status: Single     Spouse name: None     Number of children: None     Years of education: None     Highest education level: None   Occupational History     None   Social Needs     Financial resource strain: None     Food insecurity     Worry: None     Inability: None     Transportation needs     Medical: None     Non-medical: None   Tobacco Use     Smoking status: Never Smoker     Smokeless tobacco: Never Used   Substance and Sexual Activity     Alcohol use: Yes     Comment: 1-2/week     Drug use: No     Sexual activity: None   Lifestyle     Physical activity     Days per week: None     Minutes per session: None     Stress: None   Relationships     Social connections     Talks on phone: None     Gets together: None     Attends Gnosticism service: None     Active member of club or organization: None     Attends meetings of clubs or organizations: None     Relationship status: None     Intimate partner violence     Fear of current or ex partner: None     Emotionally abused: None     Physically abused: None     Forced sexual activity: None   Other Topics Concern     None   Social History Narrative     None       REVIEW OF " SYSTEMS:  10 point review of systems completed and negative else reported in the HPI.    ALLERGIES:  Allergies   Allergen Reactions     Asa [Aspirin] Angioedema and Hives     Hydrocodone      Hallucinations       PRIOR TO ADMISSION MEDICATIONS:  Prior to Admission Medications   Prescriptions Last Dose Informant Patient Reported? Taking?   acetaminophen (TYLENOL) 325 MG tablet  Self Yes No   Sig: Take 325-650 mg by mouth every 6 hours as needed for mild pain   levETIRAcetam (KEPPRA) 500 MG tablet  Self Yes No   Sig: Take 1,000 mg by mouth 2 times daily    metoprolol succinate ER (TOPROL-XL) 25 MG 24 hr tablet   No No   Sig: Take 1 tablet (25 mg) by mouth daily   ondansetron (ZOFRAN ODT) 4 MG ODT tab  Self No No   Sig: Take 1 tablet (4 mg) by mouth every 8 hours as needed for nausea   pantoprazole (PROTONIX) 40 MG EC tablet   No No   Sig: Take 1 tablet (40 mg) by mouth daily      Facility-Administered Medications: None       LABS/VITALS:  Recent Results (from the past 24 hour(s))   Basic metabolic panel    Collection Time: 10/08/20  2:13 PM   Result Value Ref Range    Sodium 135 133 - 144 mmol/L    Potassium 3.8 3.4 - 5.3 mmol/L    Chloride 108 94 - 109 mmol/L    Carbon Dioxide 16 (L) 20 - 32 mmol/L    Anion Gap 11 3 - 14 mmol/L    Glucose 176 (H) 70 - 99 mg/dL    Urea Nitrogen 4 (L) 7 - 30 mg/dL    Creatinine 0.52 0.52 - 1.04 mg/dL    GFR Estimate >90 >60 mL/min/[1.73_m2]    GFR Estimate If Black >90 >60 mL/min/[1.73_m2]    Calcium 7.6 (L) 8.5 - 10.1 mg/dL   Lactic acid whole blood    Collection Time: 10/08/20  2:13 PM   Result Value Ref Range    Lactic Acid 1.6 0.7 - 2.0 mmol/L   Blood gas venous with oxyhemoglobin    Collection Time: 10/08/20  2:13 PM   Result Value Ref Range    Ph Venous 7.29 (L) 7.32 - 7.43 pH    PCO2 Venous 30 (L) 40 - 50 mm Hg    PO2 Venous 57 (H) 25 - 47 mm Hg    Bicarbonate Venous 14 (L) 21 - 28 mmol/L    FIO2 Chip     Oxyhemoglobin Venous 89 %    Base Deficit Venous 10.8 mmol/L   Ketone  Beta-Hydroxybutyrate Quantitative    Collection Time: 10/08/20  2:13 PM   Result Value Ref Range    Ketone Quantitative 2.9 (HH) 0.0 - 0.6 mmol/L   Glucose by meter    Collection Time: 10/08/20  9:43 PM   Result Value Ref Range    Glucose 158 (H) 70 - 99 mg/dL   Glucose by meter    Collection Time: 10/09/20  2:04 AM   Result Value Ref Range    Glucose 116 (H) 70 - 99 mg/dL   Blood gas venous with oxyhemoglobin    Collection Time: 10/09/20  7:47 AM   Result Value Ref Range    Ph Venous 7.41 7.32 - 7.43 pH    PCO2 Venous 39 (L) 40 - 50 mm Hg    PO2 Venous 18 (L) 25 - 47 mm Hg    Bicarbonate Venous 25 21 - 28 mmol/L    FIO2 No oxygen      Oxyhemoglobin Venous 35 %    Base Excess Venous 0.3 mmol/L   CBC with platelets differential    Collection Time: 10/09/20  7:47 AM   Result Value Ref Range    WBC 2.8 (L) 4.0 - 11.0 10e9/L    RBC Count 4.05 3.8 - 5.2 10e12/L    Hemoglobin 13.4 11.7 - 15.7 g/dL    Hematocrit 38.3 35.0 - 47.0 %    MCV 95 78 - 100 fl    MCH 33.1 (H) 26.5 - 33.0 pg    MCHC 35.0 31.5 - 36.5 g/dL    RDW 12.5 10.0 - 15.0 %    Platelet Count 157 150 - 450 10e9/L    Diff Method Automated Method     % Neutrophils 47.9 %    % Lymphocytes 43.4 %    % Monocytes 6.8 %    % Eosinophils 1.1 %    % Basophils 0.4 %    % Immature Granulocytes 0.4 %    Nucleated RBCs 0 0 /100    Absolute Neutrophil 1.3 (L) 1.6 - 8.3 10e9/L    Absolute Lymphocytes 1.2 0.8 - 5.3 10e9/L    Absolute Monocytes 0.2 0.0 - 1.3 10e9/L    Absolute Eosinophils 0.0 0.0 - 0.7 10e9/L    Absolute Basophils 0.0 0.0 - 0.2 10e9/L    Abs Immature Granulocytes 0.0 0 - 0.4 10e9/L    Absolute Nucleated RBC 0.0    Comprehensive metabolic panel    Collection Time: 10/09/20  7:47 AM   Result Value Ref Range    Sodium 136 133 - 144 mmol/L    Potassium 2.7 (L) 3.4 - 5.3 mmol/L    Chloride 104 94 - 109 mmol/L    Carbon Dioxide 24 20 - 32 mmol/L    Anion Gap 8 3 - 14 mmol/L    Glucose 103 (H) 70 - 99 mg/dL    Urea Nitrogen 2 (L) 7 - 30 mg/dL    Creatinine 0.54 0.52  - 1.04 mg/dL    GFR Estimate >90 >60 mL/min/[1.73_m2]    GFR Estimate If Black >90 >60 mL/min/[1.73_m2]    Calcium 8.1 (L) 8.5 - 10.1 mg/dL    Bilirubin Total 1.0 0.2 - 1.3 mg/dL    Albumin 3.9 3.4 - 5.0 g/dL    Protein Total 7.5 6.8 - 8.8 g/dL    Alkaline Phosphatase 57 40 - 150 U/L    ALT 85 (H) 0 - 50 U/L     (H) 0 - 45 U/L   Magnesium    Collection Time: 10/09/20  7:47 AM   Result Value Ref Range    Magnesium 2.0 1.6 - 2.3 mg/dL   Phosphorus    Collection Time: 10/09/20  7:47 AM   Result Value Ref Range    Phosphorus <0.1 (LL) 2.5 - 4.5 mg/dL   Glucose by meter    Collection Time: 10/09/20  7:57 AM   Result Value Ref Range    Glucose 115 (H) 70 - 99 mg/dL     B/P: 120/88, T: 99.3, P: 112, R: 16    MENTAL STATUS EXAM:  Appearance:  awake, alert  Eye Contact:  good  Speech:  clear, coherent  Language :Normal  Psychomotor Behavior:  no evidence of tardive dyskinesia, dystonia, or tics  Mood:  good  Affect:  appropriate and in normal range  Thought Process:  logical, linear and goal oriented no loose associations  Thought Content:  no evidence of suicidal ideation or homicidal ideation and no evidence of psychotic thought  Oriented to:  time, person, and place  Attention Span and Concentration:  intact  Recent and Remote Memory:  intact  Fund of Knowledge: appropriate  Insight:  preserved  Judgment:  intact    DIAGNOSES:  1. potential EtOH use disorder, indeterminate     RECOMMENDATIONS:  The patient is denying any psychiatric complaints and stands firm in her endorsements of having limited her EtOH use since being hospitalized in May for a similar presentation. She has been consistent in stating her last use of alcohol was 4 days PTA which is belied by her EtOH level of 0.11 when she presented. She seamlessly told me she doesn't understand why that's the case. Similarly with observations of potential induction of emesis, she describes this being misinterpreted as she had been pressing on the inside of her  jaw due to pain, and describes the emesis as being one of the reasons she's in the hospital, not something she's inducing. I don't see indication to continue challenging her on these discrepancies with respect to EtOH use. From a induction of emesis perspective, similarly I'm not sure there's much to do at this point given these discrepancies in the team's concerns and her own explanation of circumstances. You could consider a 1:1 sitter for better observational data, though I think it warrants seeing how things progress with standard treatments before using an invasive measure such as that.      Attestation:  Patient has been seen and evaluated by me,  Félix Cardenas,     Visit/Communication Style   VIRTUAL (VIDEO) communication was used to evaluate Darya due to the COVID pandemic.    Darya consented to the use of video communication: yes  Video START time: 10:03 am, 10/9/2020  Video STOP time: 10:19 am, 10/9/2020   Patient's location: [facility]   Provider's location during the visit: Home

## 2020-10-10 VITALS
TEMPERATURE: 98.9 F | RESPIRATION RATE: 16 BRPM | OXYGEN SATURATION: 100 % | DIASTOLIC BLOOD PRESSURE: 76 MMHG | BODY MASS INDEX: 21.85 KG/M2 | SYSTOLIC BLOOD PRESSURE: 103 MMHG | WEIGHT: 123.3 LBS | HEART RATE: 105 BPM

## 2020-10-10 LAB
ALBUMIN SERPL-MCNC: 3.7 G/DL (ref 3.4–5)
ALP SERPL-CCNC: 55 U/L (ref 40–150)
ALT SERPL W P-5'-P-CCNC: 112 U/L (ref 0–50)
ANION GAP SERPL CALCULATED.3IONS-SCNC: 4 MMOL/L (ref 3–14)
AST SERPL W P-5'-P-CCNC: 182 U/L (ref 0–45)
BASOPHILS # BLD AUTO: 0 10E9/L (ref 0–0.2)
BASOPHILS NFR BLD AUTO: 0.5 %
BILIRUB SERPL-MCNC: 0.6 MG/DL (ref 0.2–1.3)
BUN SERPL-MCNC: <1 MG/DL (ref 7–30)
CALCIUM SERPL-MCNC: 8.4 MG/DL (ref 8.5–10.1)
CHLORIDE SERPL-SCNC: 103 MMOL/L (ref 94–109)
CO2 SERPL-SCNC: 30 MMOL/L (ref 20–32)
CREAT SERPL-MCNC: 0.53 MG/DL (ref 0.52–1.04)
DIFFERENTIAL METHOD BLD: ABNORMAL
EOSINOPHIL # BLD AUTO: 0 10E9/L (ref 0–0.7)
EOSINOPHIL NFR BLD AUTO: 0.5 %
ERYTHROCYTE [DISTWIDTH] IN BLOOD BY AUTOMATED COUNT: 12.5 % (ref 10–15)
GFR SERPL CREATININE-BSD FRML MDRD: >90 ML/MIN/{1.73_M2}
GLUCOSE SERPL-MCNC: 88 MG/DL (ref 70–99)
HCT VFR BLD AUTO: 36.1 % (ref 35–47)
HGB BLD-MCNC: 12.6 G/DL (ref 11.7–15.7)
IMM GRANULOCYTES # BLD: 0 10E9/L (ref 0–0.4)
IMM GRANULOCYTES NFR BLD: 0 %
LYMPHOCYTES # BLD AUTO: 1.1 10E9/L (ref 0.8–5.3)
LYMPHOCYTES NFR BLD AUTO: 49.5 %
MCH RBC QN AUTO: 32.9 PG (ref 26.5–33)
MCHC RBC AUTO-ENTMCNC: 34.9 G/DL (ref 31.5–36.5)
MCV RBC AUTO: 94 FL (ref 78–100)
MONOCYTES # BLD AUTO: 0.2 10E9/L (ref 0–1.3)
MONOCYTES NFR BLD AUTO: 7.8 %
NEUTROPHILS # BLD AUTO: 0.9 10E9/L (ref 1.6–8.3)
NEUTROPHILS NFR BLD AUTO: 41.7 %
NRBC # BLD AUTO: 0 10*3/UL
NRBC BLD AUTO-RTO: 0 /100
PHOSPHATE SERPL-MCNC: 1.1 MG/DL (ref 2.5–4.5)
PLATELET # BLD AUTO: 127 10E9/L (ref 150–450)
POTASSIUM SERPL-SCNC: 3.1 MMOL/L (ref 3.4–5.3)
PROT SERPL-MCNC: 7.1 G/DL (ref 6.8–8.8)
RBC # BLD AUTO: 3.83 10E12/L (ref 3.8–5.2)
SODIUM SERPL-SCNC: 137 MMOL/L (ref 133–144)
WBC # BLD AUTO: 2.2 10E9/L (ref 4–11)

## 2020-10-10 PROCEDURE — 258N000003 HC RX IP 258 OP 636: Performed by: INTERNAL MEDICINE

## 2020-10-10 PROCEDURE — 84100 ASSAY OF PHOSPHORUS: CPT | Performed by: INTERNAL MEDICINE

## 2020-10-10 PROCEDURE — 36415 COLL VENOUS BLD VENIPUNCTURE: CPT | Performed by: INTERNAL MEDICINE

## 2020-10-10 PROCEDURE — 80053 COMPREHEN METABOLIC PANEL: CPT | Performed by: INTERNAL MEDICINE

## 2020-10-10 PROCEDURE — 250N000011 HC RX IP 250 OP 636: Performed by: INTERNAL MEDICINE

## 2020-10-10 PROCEDURE — 250N000013 HC RX MED GY IP 250 OP 250 PS 637: Performed by: HOSPITALIST

## 2020-10-10 PROCEDURE — 250N000009 HC RX 250: Performed by: INTERNAL MEDICINE

## 2020-10-10 PROCEDURE — 99239 HOSP IP/OBS DSCHRG MGMT >30: CPT | Performed by: INTERNAL MEDICINE

## 2020-10-10 PROCEDURE — 250N000013 HC RX MED GY IP 250 OP 250 PS 637: Performed by: INTERNAL MEDICINE

## 2020-10-10 PROCEDURE — 85025 COMPLETE CBC W/AUTO DIFF WBC: CPT | Performed by: INTERNAL MEDICINE

## 2020-10-10 RX ORDER — POTASSIUM CHLORIDE 1500 MG/1
40 TABLET, EXTENDED RELEASE ORAL EVERY 4 HOURS
Status: COMPLETED | OUTPATIENT
Start: 2020-10-10 | End: 2020-10-10

## 2020-10-10 RX ORDER — TRAZODONE HYDROCHLORIDE 50 MG/1
50-100 TABLET, FILM COATED ORAL AT BEDTIME
Qty: 60 TABLET | Refills: 1 | Status: SHIPPED | OUTPATIENT
Start: 2020-10-10 | End: 2020-10-16

## 2020-10-10 RX ADMIN — PANTOPRAZOLE SODIUM 40 MG: 40 TABLET, DELAYED RELEASE ORAL at 08:29

## 2020-10-10 RX ADMIN — POTASSIUM PHOSPHATE, MONOBASIC AND POTASSIUM PHOSPHATE, DIBASIC 25 MMOL: 224; 236 INJECTION, SOLUTION, CONCENTRATE INTRAVENOUS at 08:28

## 2020-10-10 RX ADMIN — DEXTROSE AND SODIUM CHLORIDE: 5; 900 INJECTION, SOLUTION INTRAVENOUS at 02:57

## 2020-10-10 RX ADMIN — LEVETIRACETAM 1000 MG: 500 TABLET ORAL at 08:29

## 2020-10-10 RX ADMIN — ACETAMINOPHEN 650 MG: 325 TABLET, FILM COATED ORAL at 03:00

## 2020-10-10 RX ADMIN — POTASSIUM CHLORIDE 40 MEQ: 1500 TABLET, EXTENDED RELEASE ORAL at 11:52

## 2020-10-10 RX ADMIN — METOPROLOL TARTRATE 12.5 MG: 25 TABLET, FILM COATED ORAL at 08:29

## 2020-10-10 RX ADMIN — POTASSIUM CHLORIDE 40 MEQ: 1500 TABLET, EXTENDED RELEASE ORAL at 08:29

## 2020-10-10 RX ADMIN — THIAMINE HYDROCHLORIDE 100 MG: 100 INJECTION, SOLUTION INTRAMUSCULAR; INTRAVENOUS at 08:34

## 2020-10-10 ASSESSMENT — ACTIVITIES OF DAILY LIVING (ADL)
ADLS_ACUITY_SCORE: 10

## 2020-10-10 NOTE — PLAN OF CARE
DATE & TIME: 0730-1420  Cognitive Concerns/ Orientation : A&O x 4   BEHAVIOR & AGGRESSION TOOL COLOR: Green  CIWA SCORE: N/A  ABNL VS/O2: VSS on RA except Tachycardia to 100s.  MOBILITY: Up independently in room  PAIN MANAGMENT: Denies  DIET: Reg-tolerating well  BOWEL/BLADDER: Continent.  ABNL LAB/BG:  Potassium 3.1-replaced. Phos level 1.1-replaced. , , WBC 2.2   DRAIN/DEVICES: PIV infusing D5 + NS @ 75.  TELEMETRY RHYTHM: N/A  SKIN: Intact  TESTS/PROCEDURES: NA  D/C DAY/GOALS/PLACE:   OTHER IMPORTANT INFO: PT/OT signed off. CD - Rule 25 would be needed. Seen by Psych and CC.    Pt discharged to home at 1420. Discharge instructions reviewed with Pt, questions answered. Sent all personal belongings and medications with Pt.

## 2020-10-10 NOTE — PROVIDER NOTIFICATION
Dr Rodgers paged at 0784 for     Potassium 3.1  Phos 1.1   Do you want add replacement protocol?       Awaiting return call/orders  Addendum: orders received and given.

## 2020-10-10 NOTE — PLAN OF CARE
DATE & TIME: 10/9/2020 5419-4253  Cognitive Concerns/ Orientation : A&O x 4   BEHAVIOR & AGGRESSION TOOL COLOR: Green  CIWA SCORE: D/C'ed  ABNL VS/O2: VSS on RA (/72)  MOBILITY: Up independently in room, ambulated in halls x2 during shift.   PAIN MANAGMENT: Denies  DIET: Clear liquids  BOWEL/BLADDER: Cont. BM today.   ABNL LAB/BG:  Ketones 2.9. K+ 2.7, replaced- recheck 3.7. Phos critical >0.1 replaced PIV x2 -recheck in am.   DRAIN/DEVICES: PIV infusing D5 in NS @ 100.  TELEMETRY RHYTHM: D/C'ed  SKIN: Intact  TESTS/PROCEDURES: NA  D/C DAY/GOALS/PLACE: Possible discharge home larry. (10/10) pending normalized electrolytes  OTHER IMPORTANT INFO: PT/OT/CD following. Pt would need Rule 25 if wanting to pursue tx.   MD/RN ROUNDING SIGNED OFF D/E SHIFT: NA  COMMIT TO SIT DONE AND SIGNED OFF: Completed

## 2020-10-10 NOTE — PROVIDER NOTIFICATION
Dr Rodgers paged for       Do you want to give 2nd dose of Potassium Phosphate before discharge and recheck Coby and Potassium level.        Call back: No need to give 2nd dose of Potassium phosphate. No need recheck.       Comments: MD is also aware of increased AST and ALT this morning.

## 2020-10-10 NOTE — PLAN OF CARE
DATE & TIME: 2300-0730  Cognitive Concerns/ Orientation : A&O x 4   BEHAVIOR & AGGRESSION TOOL COLOR: Green  CIWA SCORE: N/A  ABNL VS/O2: VSS on RA   MOBILITY: Up independently in room  PAIN MANAGMENT: Denies  DIET: Reg  BOWEL/BLADDER: Cont. BM 10/9.   ABNL LAB/BG:  Ketones 2.9. 10/9 K replaced- recheck 3.7. 10/9 Phos critical >0.1 replaced PIV x2 -recheck this am.   DRAIN/DEVICES: PIV infusing D5 + NS @ 75.  TELEMETRY RHYTHM: N/A  SKIN: Intact  TESTS/PROCEDURES: NA  D/C DAY/GOALS/PLACE: Possible discharge home today 10/10 pending normalized electrolytes  OTHER IMPORTANT INFO: PT/OT signed off. CD - Rule 25 would be needed. Psych following. CC following for discharge planning.

## 2020-10-10 NOTE — DISCHARGE SUMMARY
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  Patient states that last alcohol use was 4 days ago.  She denies drinking every day.  Over the past 2 days she has had constant nausea and vomiting.  There is associated upper abdominal discomfort and lower chest discomfort.  There are streaks of some bright red blood in emesis, which did start after she had been vomiting for some time.  She also noticed new shortness of breath today.  No fever.  She endorses coughing while vomiting but otherwise has not had a cough.  No diarrhea or constipation, no dysuria or hematuria.  No headache.  She denies other drug use.  While she has been trying to take her medications over the past 2 days, she is not sure if she has been able to keep them down.      In ER, , Lactate 6.0, WBC 3.4 and Hgb 16.0, and urine with Ketones, and venous PH 7.16.  Despite reporting no drinks in 4 days, her alcohol level was 0.11.      Hospital Course   Initially admitted to the ICU, given aggressive IV hydration and started on Bicarb drip to treat metabolic acidosis.  It became clear  "that patient under-reported her alcohol usage, but said she didn't think it was a problem.  Also she appears to eat very little, suspect partly because getting calories from alcohol.      When started oral intake she did have severe hypophosphatemia, treated with repeat IV phosphorus, and hypokalemia.  Her liver inflammation was probably due to alcoholic hepatitis.  She was seen by Psychiatry who concluded she had \"a potential alcohol use disorder\", and was seen by Chem Dep counselor and given paperwork if she wanted to pursue chem dep counseling.     Discussed avoiding alcohol totally for 2 weeks, and then limiting alcohol use to 1 drink a day or less.  She does report trouble sleeping, was given Trazodone 50 mg at bedtime which did help with her sleep, and will continue  mg at bedtime on discharge.      Travon Howe MD, MD  Pager: [phone]  Cell Phone:  [phone]       Significant Results and Procedures   As above    Pending Results   These results will be followed up by Dr. Howe  Unresulted Labs Ordered in the Past 30 Days of this Admission     Date and Time Order Name Status Description    10/7/2020 2315 Blood culture Preliminary     10/7/2020 2315 Blood culture Preliminary           Code Status   Full Code       Primary Care Physician   Physician No Ref-Primary    Physical Exam   Temp: 98.9  F (37.2  C) Temp src: Oral BP: 103/76 Pulse: 105   Resp: 16 SpO2: 100 % O2 Device: None (Room air)    Vitals:    10/07/20 2220 10/08/20 0453 10/09/20 0500   Weight: 56.7 kg (125 lb) 54.7 kg (120 lb 9.5 oz) 55.9 kg (123 lb 4.8 oz)     Vital Signs with Ranges  Temp:  [98  F (36.7  C)-98.9  F (37.2  C)] 98.9  F (37.2  C)  Pulse:  [] 105  Resp:  [16] 16  BP: (102-104)/(69-76) 103/76  SpO2:  [98 %-100 %] 100 %  I/O last 3 completed shifts:  In: 1811 [P.O.:240; I.V.:1571]  Out: -     Exam on discharge:   Lungs clear  CV with reg S1S2    Discharge Disposition   Discharged to home  Condition at " discharge: Good    Consultations This Hospital Stay   PHYSICAL THERAPY ADULT IP CONSULT  OCCUPATIONAL THERAPY ADULT IP CONSULT  SOCIAL WORK IP CONSULT  CARDIOLOGY IP CONSULT  PSYCHIATRY IP CONSULT  CHEMICAL DEPENDENCY IP CONSULT  CARE MANAGEMENT / SOCIAL WORK IP CONSULT  CARE MANAGEMENT / SOCIAL WORK IP CONSULT  CARE MANAGEMENT / SOCIAL WORK IP CONSULT    Time Spent on this Encounter   I spent a total of 35 minutes discharging this patient.     Discharge Orders      Follow-up and recommended labs and tests     Follow up with new primary care provider, Dr Collins, at [facility], as scheduled for you on Friday 10/16/20 at 1:00 PM for hospital follow- up.  Check BMP and Phosphorus then.     Reason for your hospital stay    Severe ketosis (metabolic and electrolyte disturbance) related to poor nutrition and alcohol use.     Discharge Instructions    Call Dr. Rodgers if any medical questions at Cell Phone [phone].     Activity    Your activity upon discharge: activity as tolerated     Full Code     Diet    Follow this diet upon discharge: Orders Placed This Encounter      Regular Diet Adult -- NO ALCOHOL for at least 2 weeks, and after that limit alcohol use to 1 drink a day or less.     Discharge Medications   Current Discharge Medication List      START taking these medications    Details   potassium & sodium phosphates (NEUTRA-PHOS) 280-160-250 MG Packet Take 1 packet by mouth 2 times daily for 6 doses  Qty: 6 packet, Refills: 0    Associated Diagnoses: Hypophosphatemia      traZODone (DESYREL) 50 MG tablet Take 1-2 tablets ( mg) by mouth At Bedtime  Qty: 60 tablet, Refills: 1    Associated Diagnoses: Insomnia, unspecified type         CONTINUE these medications which have NOT CHANGED    Details   acetaminophen (TYLENOL) 325 MG tablet Take 325-650 mg by mouth every 6 hours as needed for mild pain      levETIRAcetam (KEPPRA) 500 MG tablet Take 1,000 mg by mouth 2 times daily       metoprolol  succinate ER (TOPROL-XL) 25 MG 24 hr tablet Take 1 tablet (25 mg) by mouth daily  Qty: 60 tablet, Refills: 0    Associated Diagnoses: Cardiomyopathy due metabolic or nutritional disease (H)      ondansetron (ZOFRAN ODT) 4 MG ODT tab Take 1 tablet (4 mg) by mouth every 8 hours as needed for nausea  Qty: 10 tablet, Refills: 0      pantoprazole (PROTONIX) 40 MG EC tablet Take 1 tablet (40 mg) by mouth daily  Qty: 30 tablet, Refills: 0    Associated Diagnoses: Alcohol use           Allergies   Allergies   Allergen Reactions     Asa [Aspirin] Angioedema and Hives     Hydrocodone      Hallucinations     Data   Most Recent 3 CBC's:  Recent Labs   Lab Test 10/10/20  0619 10/09/20  0747 10/08/20  0911   WBC 2.2* 2.8* 4.3   HGB 12.6 13.4 13.4   MCV 94 95 99   * 157 240      Most Recent 3 BMP's:  Recent Labs   Lab Test 10/10/20  0619 10/09/20  1717 10/09/20  0747 10/08/20  1413     --  136 135   POTASSIUM 3.1* 3.7 2.7* 3.8   CHLORIDE 103  --  104 108   CO2 30  --  24 16*   BUN <1*  --  2* 4*   CR 0.53  --  0.54 0.52   ANIONGAP 4  --  8 11   DONOVAN 8.4*  --  8.1* 7.6*   GLC 88  --  103* 176*     Most Recent 2 LFT's:  Recent Labs   Lab Test 10/10/20  0619 10/09/20  0747   * 136*   * 85*   ALKPHOS 55 57   BILITOTAL 0.6 1.0     Most Recent INR's and Anticoagulation Dosing History:  Anticoagulation Dose History     Recent Dosing and Labs Latest Ref Rng & Units 5/3/2020    INR 0.86 - 1.14 1.26(H)        Most Recent 3 Troponin's:  Recent Labs   Lab Test 10/08/20  0911 10/07/20  2224 05/04/20  0535   TROPI <0.015 <0.015 0.445*     Most Recent Cholesterol Panel:No lab results found.  Most Recent 6 Bacteria Isolates From Any Culture (See EPIC Reports for Culture Details):  Recent Labs   Lab Test 10/07/20  2347 10/07/20  2321 05/03/20  0821 05/03/20  0808   CULT No growth after 2 days No growth after 2 days No growth No growth     Most Recent TSH, T4 and A1c Labs:  Recent Labs   Lab Test 10/08/20  0513  05/03/20  0145   TSH  --  4.31*   T4  --  0.86   A1C 4.5 4.6

## 2020-10-10 NOTE — CONSULTS
Attempted to contact pt 3x, no answer.    Per chart review, pt does not have insurance. Financial counselor met with pt, she is not eligible for assistance and hopes to get insurance coverage w/ employer in the near future. Unfortunately, this means pt will not be eligible for Rule 25 CD treatment funding either. Any treatment services would require self-pay for funding.  Once pt has insurance, she can contact the Mental Health and Addiction Services Line to complete an assessment: [phone].  In the meantime, pt can participate in free support groups like AA or [facility]. [facility] provides many free sober support resources. [facility] (www.walkin.org or 1-514.175.5044) offers free individual therapy sessions via phone or online. Please feel free to provide pt with my contact information (listed below) to further discuss options.    [email or web address]  [phone]

## 2020-10-14 LAB
BACTERIA SPEC CULT: NO GROWTH
BACTERIA SPEC CULT: NO GROWTH
SPECIMEN SOURCE: NORMAL
SPECIMEN SOURCE: NORMAL

## 2020-10-16 ENCOUNTER — OFFICE VISIT (OUTPATIENT)
Dept: FAMILY MEDICINE | Facility: CLINIC | Age: 25
End: 2020-10-16

## 2020-10-16 VITALS
HEIGHT: 63 IN | TEMPERATURE: 97.1 F | DIASTOLIC BLOOD PRESSURE: 57 MMHG | BODY MASS INDEX: 22.15 KG/M2 | OXYGEN SATURATION: 100 % | SYSTOLIC BLOOD PRESSURE: 87 MMHG | HEART RATE: 99 BPM | WEIGHT: 125 LBS

## 2020-10-16 DIAGNOSIS — Z09 HOSPITAL DISCHARGE FOLLOW-UP: Primary | ICD-10-CM

## 2020-10-16 DIAGNOSIS — K70.10 ALCOHOLIC HEPATITIS WITHOUT ASCITES (H): ICD-10-CM

## 2020-10-16 DIAGNOSIS — F10.20 ALCOHOL USE DISORDER, MODERATE, DEPENDENCE (H): ICD-10-CM

## 2020-10-16 DIAGNOSIS — G47.00 INSOMNIA, UNSPECIFIED TYPE: ICD-10-CM

## 2020-10-16 DIAGNOSIS — I42.9 SECONDARY CARDIOMYOPATHY (H): ICD-10-CM

## 2020-10-16 DIAGNOSIS — R56.9 SEIZURE (H): ICD-10-CM

## 2020-10-16 DIAGNOSIS — R00.0 SINUS TACHYCARDIA: ICD-10-CM

## 2020-10-16 DIAGNOSIS — Z51.81 ENCOUNTER FOR THERAPEUTIC DRUG LEVEL MONITORING: ICD-10-CM

## 2020-10-16 DIAGNOSIS — D70.8 OTHER NEUTROPENIA (H): ICD-10-CM

## 2020-10-16 LAB
ALBUMIN SERPL-MCNC: 3.7 G/DL (ref 3.4–5)
ALP SERPL-CCNC: 72 U/L (ref 40–150)
ALT SERPL W P-5'-P-CCNC: 487 U/L (ref 0–50)
ANION GAP SERPL CALCULATED.3IONS-SCNC: 8 MMOL/L (ref 3–14)
AST SERPL W P-5'-P-CCNC: 449 U/L (ref 0–45)
BASOPHILS # BLD AUTO: 0.1 10E9/L (ref 0–0.2)
BASOPHILS NFR BLD AUTO: 1.7 %
BILIRUB SERPL-MCNC: 0.3 MG/DL (ref 0.2–1.3)
BUN SERPL-MCNC: 7 MG/DL (ref 7–30)
CALCIUM SERPL-MCNC: 9.4 MG/DL (ref 8.5–10.1)
CHLORIDE SERPL-SCNC: 102 MMOL/L (ref 94–109)
CO2 SERPL-SCNC: 27 MMOL/L (ref 20–32)
CREAT SERPL-MCNC: 0.56 MG/DL (ref 0.52–1.04)
DIFFERENTIAL METHOD BLD: ABNORMAL
EOSINOPHIL # BLD AUTO: 0 10E9/L (ref 0–0.7)
EOSINOPHIL NFR BLD AUTO: 1.3 %
ERYTHROCYTE [DISTWIDTH] IN BLOOD BY AUTOMATED COUNT: 12.9 % (ref 10–15)
GFR SERPL CREATININE-BSD FRML MDRD: >90 ML/MIN/{1.73_M2}
GLUCOSE SERPL-MCNC: 77 MG/DL (ref 70–99)
HCT VFR BLD AUTO: 37.4 % (ref 35–47)
HGB BLD-MCNC: 12.7 G/DL (ref 11.7–15.7)
INR PPP: 0.96 (ref 0.86–1.14)
LYMPHOCYTES # BLD AUTO: 1.3 10E9/L (ref 0.8–5.3)
LYMPHOCYTES NFR BLD AUTO: 42.9 %
MAGNESIUM SERPL-MCNC: 1.6 MG/DL (ref 1.6–2.3)
MCH RBC QN AUTO: 33.8 PG (ref 26.5–33)
MCHC RBC AUTO-ENTMCNC: 34 G/DL (ref 31.5–36.5)
MCV RBC AUTO: 100 FL (ref 78–100)
MONOCYTES # BLD AUTO: 0.7 10E9/L (ref 0–1.3)
MONOCYTES NFR BLD AUTO: 24.4 %
NEUTROPHILS # BLD AUTO: 0.9 10E9/L (ref 1.6–8.3)
NEUTROPHILS NFR BLD AUTO: 29.7 %
PLATELET # BLD AUTO: 341 10E9/L (ref 150–450)
POTASSIUM SERPL-SCNC: 3.5 MMOL/L (ref 3.4–5.3)
PROT SERPL-MCNC: 7.5 G/DL (ref 6.8–8.8)
RBC # BLD AUTO: 3.76 10E12/L (ref 3.8–5.2)
SODIUM SERPL-SCNC: 137 MMOL/L (ref 133–144)
WBC # BLD AUTO: 3 10E9/L (ref 4–11)

## 2020-10-16 PROCEDURE — 99000 SPECIMEN HANDLING OFFICE-LAB: CPT | Performed by: INTERNAL MEDICINE

## 2020-10-16 PROCEDURE — 99204 OFFICE O/P NEW MOD 45 MIN: CPT | Performed by: INTERNAL MEDICINE

## 2020-10-16 PROCEDURE — 85610 PROTHROMBIN TIME: CPT | Performed by: INTERNAL MEDICINE

## 2020-10-16 PROCEDURE — 93000 ELECTROCARDIOGRAM COMPLETE: CPT | Performed by: INTERNAL MEDICINE

## 2020-10-16 PROCEDURE — 80177 DRUG SCRN QUAN LEVETIRACETAM: CPT | Mod: 90 | Performed by: INTERNAL MEDICINE

## 2020-10-16 PROCEDURE — 36415 COLL VENOUS BLD VENIPUNCTURE: CPT | Performed by: INTERNAL MEDICINE

## 2020-10-16 PROCEDURE — 85025 COMPLETE CBC W/AUTO DIFF WBC: CPT | Performed by: INTERNAL MEDICINE

## 2020-10-16 PROCEDURE — 83735 ASSAY OF MAGNESIUM: CPT | Performed by: INTERNAL MEDICINE

## 2020-10-16 PROCEDURE — 80053 COMPREHEN METABOLIC PANEL: CPT | Performed by: INTERNAL MEDICINE

## 2020-10-16 RX ORDER — TRAZODONE HYDROCHLORIDE 50 MG/1
25-50 TABLET, FILM COATED ORAL AT BEDTIME
Qty: 60 TABLET | Refills: 1 | COMMUNITY
Start: 2020-10-16 | End: 2023-11-07

## 2020-10-16 SDOH — HEALTH STABILITY: MENTAL HEALTH: HOW OFTEN DO YOU HAVE A DRINK CONTAINING ALCOHOL?: NOT ASKED

## 2020-10-16 SDOH — HEALTH STABILITY: MENTAL HEALTH: HOW OFTEN DO YOU HAVE 6 OR MORE DRINKS ON ONE OCCASION?: NOT ASKED

## 2020-10-16 SDOH — HEALTH STABILITY: MENTAL HEALTH: HOW MANY STANDARD DRINKS CONTAINING ALCOHOL DO YOU HAVE ON A TYPICAL DAY?: NOT ASKED

## 2020-10-16 ASSESSMENT — MIFFLIN-ST. JEOR: SCORE: 1271.13

## 2020-10-16 NOTE — PROGRESS NOTES
Subjective     Darya Hamilton is a 25 year old female who presents to clinic today for the following health issues:    HPI          Chief Complaint   Patient presents with     Hospital F/U     Medication Request     pt states she left her bag in [employer] when going home from hospital and it had her Trazodone in it and she hasn't got her bag back yet.  Pt wondering if she could get a replacement prescription.           Hospital Follow-up Visit:    Hospital/Nursing Home/IP Rehab Facility: [facility]  Date of Admission: 10/07/2020  Date of Discharge: 10/10/2010   Reason(s) for Admission: primary Alcoholic ketoacidosis        Was your hospitalization related to COVID-19? No   Problems taking medications regularly:  Trazodone, lost in [employer] cab took only 2 tabs helped with her sleep  Medication changes since discharge: None  Problems adhering to non-medication therapy:  None    Summary of hospitalization:  [facility] discharge summary reviewed  Diagnostic Tests/Treatments reviewed.  Follow up needed: yes  Other Healthcare Providers Involved in Patient s Care:         Care Coordination, Specialist appointment - cardiology, neurology addiction medicine and MTM  Update since discharge: improved.   Post Discharge Medication Reconciliation: discharge medications reconciled, continue medications without change.  Plan of care communicated with patient            New Patient/Transfer of Care  Patient presenting for hospital follow-up; new patient.  Patient has a history of Hodgkin lymphoma in remission since 2011.  She had nonischemic cardiomyopathy, last echo in 2018 showed ejection fraction of 20% recent echo in the hospital showed ejection fraction 45%.  Patient presented with recurrent vomiting and was found to have alcohol induced transaminitis, bone marrow suppression with leukopenia with white count down to 2200.  Patient had seizure back in May 2020, she was put on Keppra but she has not  followed up with any seizure specialist, she had an MRI was nonrevealing.  EEG.  She currently is taking only half a pill of the Keppra, I assume is a 500 mg dose.  Patient does not seem to be compliant with her medications, nor the dosages of her medication, she was put on metoprolol back in May 2020 for the cardiomyopathy but she has not been taking till recently, currently she has been doing well since discharge from the hospital denies any alcohol intake since discharge from the hospital.  She denies any shortness of breath, difficulty breathing, dizziness lightheadedness.  Patient denies any GI symptoms, no epigastric pain, GERD symptoms, no diarrhea, nausea vomiting or melena.  Denies any breathing issues.  Her COVID test was negative.  Denies any focal weakness numbness, she sometimes feels slightly somnolent during the day, she said she was put on trazodone to help her sleep, she took twice she lost the tablets at [employer] cab.  Trazodone did help her sleep, she was prescribed up to 200 mg as needed at bedtime.  As for metoprolol, her last dose was today she states although I do not see any refills of metoprolol given since last prescribed..  She has been drinking a lot of fluids including vitamin water and Gatorade.  Her appetite is back to normal.  She does work in [employer].  She does not drink alcohol when she works but states she is now not working being on [employer]. she sometimes drinks couple drinks per day but does mention none since last hospitalization .  Patient denies any depression or anxiety.  She describes she drinks when she is under stress.  She had several deaths in her family her sister passed away last year and her grandma several months ago.    Review of Systems   Constitutional, HEENT, cardiovascular, pulmonary, GI, , musculoskeletal, neuro, skin, endocrine and psych systems are negative, except as otherwise noted.      Objective    BP (!) 87/57   Pulse 99   Temp 97.1  F (36.2  C)  "(Temporal)   Ht 1.6 m (5' 3\")   Wt 56.7 kg (125 lb)   SpO2 100%   BMI 22.14 kg/m    Body mass index is 22.14 kg/m .  Physical Exam   GENERAL: healthy, alert and no distress, pleasant   EYES: Eyes grossly normal to inspection, PERRL and conjunctivae and sclerae normal  Multiple medical conditions  NECK: no adenopathy, no asymmetry, masses, or scars and thyroid normal to palpation  RESP: lungs clear to auscultation - no rales, rhonchi or wheezes  CV: regular rate and rhythm, tachycardic, normal S1 S2, no S3 or S4, no murmur, click or rub, no peripheral edema and peripheral pulses strong  ABDOMEN: soft, nontender, no hepatosplenomegaly, no masses and bowel sounds normal  MS: no gross musculoskeletal defects noted, no edema  SKIN: no suspicious lesions or rashes  NEURO: Normal strength and tone, mentation intact and speech normal, no focal deficit, negative cerebellar signs  PSYCH: mentation appears normal, affect normal/bright    No results found for this or any previous visit (from the past 24 hour(s)).        Assessment & Plan   Problem List Items Addressed This Visit        Nervous and Auditory    Seizure Disorder    Relevant Orders    Keppra (Levetiracetam) Level (Completed)    MINCEP Epilepsy Referral       Digestive    Alcoholic hepatitis without ascites    Relevant Orders    Comprehensive metabolic panel (BMP + Alb, Alk Phos, ALT, AST, Total. Bili, TP) (Completed)    INR (Completed)    Hepatic panel (Albumin, ALT, AST, Bili, Alk Phos, TP)    GASTROENTEROLOGY ADULT REF CONSULT ONLY       Circulatory    Sinus tachycardia    Relevant Orders    Comprehensive metabolic panel (BMP + Alb, Alk Phos, ALT, AST, Total. Bili, TP) (Completed)    CBC with platelets and differential (Completed)    EKG 12-lead complete w/read - Clinics (Completed)      Other Visit Diagnoses     Hospital discharge follow-up    -  Primary    Secondary cardiomyopathy (H)        Relevant Orders    Comprehensive metabolic panel (BMP + Alb, Alk " Phos, ALT, AST, Total. Bili, TP) (Completed)    CARDIOLOGY EVAL ADULT REFERRAL    EKG 12-lead complete w/read - Clinics (Completed)    Alcohol use disorder, moderate, dependence (H)        Relevant Orders    MENTAL HEALTH REFERRAL  - Adult; Addiction Medicine Provider; Addiction Medicine Evaluation & Treatment; Addiction Medicine Consultation, Evaluation & Treatment [phone]; Alcohol; Other: Enter in Comments    Magnesium (Completed)    Encounter for therapeutic drug level monitoring        Relevant Orders    MENTAL HEALTH REFERRAL  - Adult; Addiction Medicine Provider; Addiction Medicine Evaluation & Treatment; Addiction Medicine Consultation, Evaluation & Treatment [phone]; Alcohol; Other: Enter in Comments    Insomnia, unspecified type        Relevant Medications    traZODone (DESYREL) 50 MG tablet    Other neutropenia (H)        Relevant Orders    CBC with platelets         Referral to cardiology; cardiomyopathy I would hold on restarting beta-blocker now because of hypotension, establish with cardiology.  Keep pushing fluids  Repeat labs today including Keppra level.  Establish with neurology-seizure clinic, noncompliant with medications, currently taking half a tablet of the Keppra; I am concerned with her being on Keppra and drinking alcohol as well.  Referral placed also to addiction medicine/alcohol chronic dependency.  Denies any other mental health condition, denies depression or anxiety in particular.  Counseled importance of abstinence from alcohol intake, she has been since last hospitalization.  With alcohol liver disease, check liver enzymes again and INR.  Advised to keep Tylenol and/or anti-inflammatories to minimal.  Follow-up in 1 to 2 months and as needed  We will give a flu vaccine today.           MEDICATIONS:  Continue current medications without change  CONSULTATION/REFERRAL to cardiology, neurology seizure clinic, addiction medicine  See Patient Instructions  Return in about 3  months (around 1/16/2021), or if symptoms worsen or fail to improve.    Willis Collins MD  [facility]

## 2020-10-16 NOTE — LETTER
October 19, 2020      Darya Hamilton  [address] [facility] [address]        Dear ,    We are writing to inform you of your test results.    Darya,  reviewed your labs,   Magnesium level is on low normal at 1.6, encourage to take magnesium over-the-counter at least 250 mg 2-3 times per week   Chemistry shows a potassium on the low side at 3.5 sodium is normal, increase potassium in your diet such as intake of bananas,   Chemistry shows normal kidney function test, normal calcium level, Normal total protein, marked increase of liver enzymes ALT and AST; , ,   We will repeat your liver enzymes next week, I will put an urgent referral to GI gastroenterology that I recommend you see,  as you have alcohol induced liver disease/ hepatitis, please abstain completely from any alcohol intake. Also please Abstain from any Tylenol or Motrin Advil Aleve are all these can be toxic to the liver.   INR is normal, shows no coagulopathy though was elevated 5 months ago from the alcohol liver disease I suspect.   Your CBC shows low white count but has improved from 7 days ago, white cell count is 3000, up from 2200,  normal hemoglobin hematocrit there is no anemia   , Normal platelet count, normal differential , your low white blood cell count I assume is also due to alcohol induced bone marrow toxicity. Will monitor the white count, if persistently low, please you will need to follow with oncology/hematology given your history.   Please call to schedule your labs next week.   Any further questions please let us know     Dr Collins     Resulted Orders   Comprehensive metabolic panel (BMP + Alb, Alk Phos, ALT, AST, Total. Bili, TP)   Result Value Ref Range    Sodium 137 133 - 144 mmol/L    Potassium 3.5 3.4 - 5.3 mmol/L    Chloride 102 94 - 109 mmol/L    Carbon Dioxide 27 20 - 32 mmol/L    Anion Gap 8 3 - 14 mmol/L    Glucose 77 70 - 99 mg/dL    Urea Nitrogen 7 7 - 30 mg/dL    Creatinine 0.56 0.52 -  1.04 mg/dL    GFR Estimate >90 >60 mL/min/[1.73_m2]      Comment:      Non  GFR Calc  Starting 12/18/2018, serum creatinine based estimated GFR (eGFR) will be   calculated using the Chronic Kidney Disease Epidemiology Collaboration   (CKD-EPI) equation.      GFR Estimate If Black >90 >60 mL/min/[1.73_m2]      Comment:       GFR Calc  Starting 12/18/2018, serum creatinine based estimated GFR (eGFR) will be   calculated using the Chronic Kidney Disease Epidemiology Collaboration   (CKD-EPI) equation.      Calcium 9.4 8.5 - 10.1 mg/dL    Bilirubin Total 0.3 0.2 - 1.3 mg/dL    Albumin 3.7 3.4 - 5.0 g/dL    Protein Total 7.5 6.8 - 8.8 g/dL    Alkaline Phosphatase 72 40 - 150 U/L     (H) 0 - 50 U/L     (H) 0 - 45 U/L   INR   Result Value Ref Range    INR 0.96 0.86 - 1.14   CBC with platelets and differential   Result Value Ref Range    WBC 3.0 (L) 4.0 - 11.0 10e9/L    RBC Count 3.76 (L) 3.8 - 5.2 10e12/L    Hemoglobin 12.7 11.7 - 15.7 g/dL    Hematocrit 37.4 35.0 - 47.0 %     78 - 100 fl    MCH 33.8 (H) 26.5 - 33.0 pg    MCHC 34.0 31.5 - 36.5 g/dL    RDW 12.9 10.0 - 15.0 %    Platelet Count 341 150 - 450 10e9/L    % Neutrophils 29.7 %    % Lymphocytes 42.9 %    % Monocytes 24.4 %    % Eosinophils 1.3 %    % Basophils 1.7 %    Absolute Neutrophil 0.9 (L) 1.6 - 8.3 10e9/L    Absolute Lymphocytes 1.3 0.8 - 5.3 10e9/L    Absolute Monocytes 0.7 0.0 - 1.3 10e9/L    Absolute Eosinophils 0.0 0.0 - 0.7 10e9/L    Absolute Basophils 0.1 0.0 - 0.2 10e9/L    Diff Method Automated Method    Magnesium   Result Value Ref Range    Magnesium 1.6 1.6 - 2.3 mg/dL       If you have any questions or concerns, please call the clinic at the number listed above.       Sincerely,        Willis Collins MD

## 2020-10-17 NOTE — RESULT ENCOUNTER NOTE
Please call and notify patient of the following lab results  Darya,  reviewed your labs,  Magnesium level is on low normal at 1.6, encourage to take magnesium over-the-counter at least 250 mg 2-3 times per week  Chemistry shows a potassium on the low side at 3.5 sodium is normal, increase potassium in your diet such as intake of bananas,  Chemistry shows normal kidney function test, normal calcium level, Normal total protein, marked increase of liver enzymes ALT and AST; , ,  We will repeat your liver enzymes next week, I will put an urgent referral to GI gastroenterology that I recommend you see,  as you have alcohol induced liver disease/ hepatitis, please abstain completely from any alcohol intake. Also please Abstain from any Tylenol or Motrin Advil Aleve are all these can be toxic to the liver.  INR is normal, shows no coagulopathy though was elevated 5 months ago from the alcohol liver disease I suspect.  Your CBC shows low white count but has improved from 7 days ago, white cell count is 3000, up from 2200,  normal hemoglobin hematocrit there is no anemia  , Normal platelet count, normal differential , your low white blood cell count I assume is also due to alcohol induced bone marrow toxicity. Will monitor the white count, if persistently low, please you will need to follow with oncology/hematology given your history.   Please call to schedule your labs next week.  Any further questions please let us know  Dr Collins

## 2020-10-19 ENCOUNTER — TELEPHONE (OUTPATIENT)
Dept: ADDICTION MEDICINE | Facility: CLINIC | Age: 25
End: 2020-10-19

## 2020-10-19 NOTE — TELEPHONE ENCOUNTER
"Please schedule appointment for patient with Addiction Medicine Provider   For alcohol use disorder       Referring Provider: Please feel free to reply with any specific patient care needs.   I can help address concerns and/or relay the information to the provider who will be scheduled.     No need to reply if consult is sufficient.      Our scheduling staff will offer the following information:   Please invite our patient to call [facility] assessment line - [phone]. They can ask for a \"substance use assessment\" or \"rule 25\". This will allow them to discuss possible psychosocial treatment options including individual therapy or any group options including outpatient, intensive outpatient, or residential (aka inpatient) treatment.     Jonnie Edwards MD    "

## 2020-10-19 NOTE — TELEPHONE ENCOUNTER
Please review referral. Please route back to writer.     Thank you,    Deedee Redd    [facility]

## 2020-10-20 LAB — LEVETIRACETAM SERPL-MCNC: <2 UG/ML (ref 12–46)

## 2020-10-21 ENCOUNTER — OFFICE VISIT (OUTPATIENT)
Dept: CARDIOLOGY | Facility: CLINIC | Age: 25
End: 2020-10-21
Attending: INTERNAL MEDICINE

## 2020-10-21 VITALS
OXYGEN SATURATION: 98 % | BODY MASS INDEX: 23.94 KG/M2 | HEART RATE: 104 BPM | DIASTOLIC BLOOD PRESSURE: 56 MMHG | WEIGHT: 135.1 LBS | SYSTOLIC BLOOD PRESSURE: 95 MMHG | HEIGHT: 63 IN

## 2020-10-21 DIAGNOSIS — E63.9 CARDIOMYOPATHY DUE METABOLIC OR NUTRITIONAL DISEASE (H): Primary | ICD-10-CM

## 2020-10-21 DIAGNOSIS — I43 CARDIOMYOPATHY DUE METABOLIC OR NUTRITIONAL DISEASE (H): Primary | ICD-10-CM

## 2020-10-21 DIAGNOSIS — I42.9 SECONDARY CARDIOMYOPATHY (H): ICD-10-CM

## 2020-10-21 DIAGNOSIS — E88.9 CARDIOMYOPATHY DUE METABOLIC OR NUTRITIONAL DISEASE (H): Primary | ICD-10-CM

## 2020-10-21 PROCEDURE — 99203 OFFICE O/P NEW LOW 30 MIN: CPT | Performed by: INTERNAL MEDICINE

## 2020-10-21 ASSESSMENT — MIFFLIN-ST. JEOR: SCORE: 1316.94

## 2020-10-21 NOTE — PROGRESS NOTES
HPI and Plan:   See dictation    No orders of the defined types were placed in this encounter.      No orders of the defined types were placed in this encounter.      Encounter Diagnoses   Name Primary?     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     Sinus tachycardia        CURRENT MEDICATIONS:  Current Outpatient Medications   Medication Sig Dispense Refill     levETIRAcetam (KEPPRA) 500 MG tablet Take 1,000 mg by mouth 2 times daily        metoprolol succinate ER (TOPROL-XL) 25 MG 24 hr tablet Take 1 tablet (25 mg) by mouth daily (Patient not taking: Reported on 10/21/2020) 60 tablet 0     traZODone (DESYREL) 50 MG tablet Take 0.5-1 tablets (25-50 mg) by mouth At Bedtime 60 tablet 1       ALLERGIES     Allergies   Allergen Reactions     Asa [Aspirin] Angioedema and Hives     Hydrocodone      Hallucinations       PAST MEDICAL HISTORY:  Past Medical History:   Diagnosis Date     HFrEF (heart failure with reduced ejection fraction) (H) 05/2020    EF 20-25%, likely stress-induced cardiomyopathy     Hodgkin's lymphoma (H) 2010    in remission since chemotherapy     Seizures (H)        PAST SURGICAL HISTORY:  Past Surgical History:   Procedure Laterality Date     CHOLECYSTECTOMY         FAMILY HISTORY:  Family History   Problem Relation Age of Onset     Liver Disease Mother      Obesity Sister      Sickle Cell Trait Sister        SOCIAL HISTORY:  Social History     Socioeconomic History     Marital status: Single     Spouse name: None     Number of children: None     Years of education: None     Highest education level: None   Occupational History     None   Social Needs     Financial resource strain: None     Food insecurity     Worry: None     Inability: None     Transportation needs     Medical: None     Non-medical: None   Tobacco Use     Smoking status: Never Smoker     Smokeless tobacco: Never Used   Substance and Sexual Activity     Alcohol use: Not Currently  "    Comment: 1-2/week.  Oct 20, 2020 pt states no current alcohol     Drug use: No     Sexual activity: None   Lifestyle     Physical activity     Days per week: None     Minutes per session: None     Stress: None   Relationships     Social connections     Talks on phone: None     Gets together: None     Attends Restorationist service: None     Active member of club or organization: None     Attends meetings of clubs or organizations: None     Relationship status: None     Intimate partner violence     Fear of current or ex partner: None     Emotionally abused: None     Physically abused: None     Forced sexual activity: None   Other Topics Concern     Parent/sibling w/ CABG, MI or angioplasty before 65F 55M? Not Asked   Social History Narrative     None       Review of Systems:  Skin:  Negative     Eyes:  Negative    ENT:  Negative    Respiratory:  Positive for dyspnea on exertion  Cardiovascular:    Positive for;lightheadedness  Gastroenterology: Negative    Genitourinary:  Negative    Musculoskeletal:  Negative    Neurologic:  Negative    Psychiatric:  Negative    Heme/Lymph/Imm:  Negative    Endocrine:  Negative      Physical Exam:  Vitals: BP 95/56   Pulse 104   Ht 1.6 m (5' 3\")   Wt 61.3 kg (135 lb 1.6 oz)   SpO2 98%   BMI 23.93 kg/m      Constitutional:           Skin:             Head:           Eyes:           Lymph:      ENT:           Neck:           Respiratory:            Cardiac:                                                           GI:           Extremities and Muscular Skeletal:                 Neurological:           Psych:           Recent Lab Results:  LIPID RESULTS:  No results found for: CHOL, HDL, LDL, TRIG, CHOLHDLRATIO    LIVER ENZYME RESULTS:  Lab Results   Component Value Date     (H) 10/16/2020     (H) 10/16/2020       CBC RESULTS:  Lab Results   Component Value Date    WBC 3.0 (L) 10/16/2020    RBC 3.76 (L) 10/16/2020    HGB 12.7 10/16/2020    HCT 37.4 10/16/2020    MCV " 100 10/16/2020    MCH 33.8 (H) 10/16/2020    MCHC 34.0 10/16/2020    RDW 12.9 10/16/2020     10/16/2020       BMP RESULTS:  Lab Results   Component Value Date     10/16/2020    POTASSIUM 3.5 10/16/2020    CHLORIDE 102 10/16/2020    CO2 27 10/16/2020    ANIONGAP 8 10/16/2020    GLC 77 10/16/2020    BUN 7 10/16/2020    CR 0.56 10/16/2020    GFRESTIMATED >90 10/16/2020    GFRESTBLACK >90 10/16/2020    DONOVAN 9.4 10/16/2020        A1C RESULTS:  Lab Results   Component Value Date    A1C 4.5 10/08/2020       INR RESULTS:  Lab Results   Component Value Date    INR 0.96 10/16/2020    INR 1.26 (H) 05/03/2020           CC  Willis Collins MD  [address]  [place] [address]

## 2020-10-21 NOTE — Clinical Note
10/21/2020    Physician No Ref-Primary  No address on file    RE: Darya Hamilton       Dear Colleague,    I had the pleasure of seeing Darya Hamilton in the [facility].    HPI and Plan:   See dictation    No orders of the defined types were placed in this encounter.      No orders of the defined types were placed in this encounter.      Encounter Diagnoses   Name Primary?     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     Sinus tachycardia        CURRENT MEDICATIONS:  Current Outpatient Medications   Medication Sig Dispense Refill     levETIRAcetam (KEPPRA) 500 MG tablet Take 1,000 mg by mouth 2 times daily        metoprolol succinate ER (TOPROL-XL) 25 MG 24 hr tablet Take 1 tablet (25 mg) by mouth daily (Patient not taking: Reported on 10/21/2020) 60 tablet 0     traZODone (DESYREL) 50 MG tablet Take 0.5-1 tablets (25-50 mg) by mouth At Bedtime 60 tablet 1       ALLERGIES     Allergies   Allergen Reactions     Asa [Aspirin] Angioedema and Hives     Hydrocodone      Hallucinations       PAST MEDICAL HISTORY:  Past Medical History:   Diagnosis Date     HFrEF (heart failure with reduced ejection fraction) (H) 05/2020    EF 20-25%, likely stress-induced cardiomyopathy     Hodgkin's lymphoma (H) 2010    in remission since chemotherapy     Seizures (H)        PAST SURGICAL HISTORY:  Past Surgical History:   Procedure Laterality Date     CHOLECYSTECTOMY         FAMILY HISTORY:  Family History   Problem Relation Age of Onset     Liver Disease Mother      Obesity Sister      Sickle Cell Trait Sister        SOCIAL HISTORY:  Social History     Socioeconomic History     Marital status: Single     Spouse name: None     Number of children: None     Years of education: None     Highest education level: None   Occupational History     None   Social Needs     Financial resource strain: None     Food insecurity     Worry: None     Inability:  "None     Transportation needs     Medical: None     Non-medical: None   Tobacco Use     Smoking status: Never Smoker     Smokeless tobacco: Never Used   Substance and Sexual Activity     Alcohol use: Not Currently     Comment: 1-2/week.  Oct 20, 2020 pt states no current alcohol     Drug use: No     Sexual activity: None   Lifestyle     Physical activity     Days per week: None     Minutes per session: None     Stress: None   Relationships     Social connections     Talks on phone: None     Gets together: None     Attends Faith service: None     Active member of club or organization: None     Attends meetings of clubs or organizations: None     Relationship status: None     Intimate partner violence     Fear of current or ex partner: None     Emotionally abused: None     Physically abused: None     Forced sexual activity: None   Other Topics Concern     Parent/sibling w/ CABG, MI or angioplasty before 65F 55M? Not Asked   Social History Narrative     None       Review of Systems:  Skin:  Negative     Eyes:  Negative    ENT:  Negative    Respiratory:  Positive for dyspnea on exertion  Cardiovascular:    Positive for;lightheadedness  Gastroenterology: Negative    Genitourinary:  Negative    Musculoskeletal:  Negative    Neurologic:  Negative    Psychiatric:  Negative    Heme/Lymph/Imm:  Negative    Endocrine:  Negative      Physical Exam:  Vitals: BP 95/56   Pulse 104   Ht 1.6 m (5' 3\")   Wt 61.3 kg (135 lb 1.6 oz)   SpO2 98%   BMI 23.93 kg/m      Constitutional:           Skin:             Head:           Eyes:           Lymph:      ENT:           Neck:           Respiratory:            Cardiac:                                                           GI:           Extremities and Muscular Skeletal:                 Neurological:           Psych:           Recent Lab Results:  LIPID RESULTS:  No results found for: CHOL, HDL, LDL, TRIG, CHOLHDLRATIO    LIVER ENZYME RESULTS:  Lab Results   Component Value Date "     (H) 10/16/2020     (H) 10/16/2020       CBC RESULTS:  Lab Results   Component Value Date    WBC 3.0 (L) 10/16/2020    RBC 3.76 (L) 10/16/2020    HGB 12.7 10/16/2020    HCT 37.4 10/16/2020     10/16/2020    MCH 33.8 (H) 10/16/2020    MCHC 34.0 10/16/2020    RDW 12.9 10/16/2020     10/16/2020       BMP RESULTS:  Lab Results   Component Value Date     10/16/2020    POTASSIUM 3.5 10/16/2020    CHLORIDE 102 10/16/2020    CO2 27 10/16/2020    ANIONGAP 8 10/16/2020    GLC 77 10/16/2020    BUN 7 10/16/2020    CR 0.56 10/16/2020    GFRESTIMATED >90 10/16/2020    GFRESTBLACK >90 10/16/2020    DONOVAN 9.4 10/16/2020        A1C RESULTS:  Lab Results   Component Value Date    A1C 4.5 10/08/2020       INR RESULTS:  Lab Results   Component Value Date    INR 0.96 10/16/2020    INR 1.26 (H) 05/03/2020           CC  Willis Collins MD  [address] [facility] [address] [address]                  Service Date: 10/21/2020      It is a pleasure for me to see Darya today for followup of cardiomyopathy.  She is a very delightful 25-year-old lady who was consulted on by my colleagues, Darrell Javier and Mario in May of this year.  At that time, she was admitted with multiple metabolic issues.  She had a severe lactic acidosis with a lactic acid level of just over 7.  She had hematemesis secondary to a Kayla-Edwards tear.  She was in acute hypoxic renal failure and she was diagnosed with having moderate to severe protein calorie malnutrition as well.  Some of these metabolic abnormalities may be due to history of alcoholism.  More recently in October she was admitted with alcoholic ketoacidosis.      During hospitalization in May, echocardiogram showed ejection fraction of 20% and she also had intermittent episodes of wide complex tachycardia.  She was seen by Dr. Javier who thought this may be due to atrial tachycardia with aberrancy.  Attempted chemical cardioversion with adenosine was unsuccessful.   However, with correction of her metabolic abnormalities, she has significantly improved.      Most importantly, she tells me that she has completely abstained from alcohol over the past 3 weeks and she has no symptoms such as shortness of breath, chest discomfort, dizzy spells, syncopal episodes.      There is no history of tobacco or any other drug abuse.  She is not diabetic or hypertensive.      Her cardiac examination is completely normal.  EKG done recently was normal as well.  I am also very happy to see that the most recent echocardiogram shows that her ejection fraction has improved to 46% on biplane and there were no major valvular lesions.      I should also mention that she had chemotherapy as a teenager for Hodgkin lymphoma.  Her physicians at that time were very conscious of potential cardiotoxic effects of chemotherapy and she had been told that she was cured and there were no adverse cardiac effects from her treatment.  I am very inclined to think that her cardiomyopathy diagnosed recently is probably metabolic in origin.  Perhaps there may be a stress-induced element to it and certainly alcohol would be playing a big part.  Again, I am happy to see that she is abstaining from alcohol and there is substantial improvement in ejection fraction.      She does have low blood pressure, but she is asymptomatic.  I am sure another contribution to her low blood pressure would be Toprol-XL 25 mg once daily.  She is on just a very low dose and I would strongly advise continuing to take it for the time being as there is significant evidence that withdrawing treatment would worsen her cardiomyopathy again.      Hopefully, she is well on the road to recovery and I look forward to seeing her again in 6 months' time.  Repeat echocardiogram will be performed prior to clinic visit and I am cautiously optimistic that there would be complete recovery of her left ventricular ejection fraction.         MELIZA PULIDO MD,  FACC             D: 10/21/2020   T: 10/21/2020   MT: juan manuel      Name:     JOHN COOLEY   MRN:      [id number]        Account:      [id number]   :      1995           Service Date: 10/21/2020      Document: [id number]        Thank you for allowing me to participate in the care of your patient.      Sincerely,     DR MELIZA PULIDO MD     [facility]    cc:   Willis Collins MD  [address] [address]

## 2020-10-21 NOTE — LETTER
10/21/2020      Willis Collins MD  [address] [address]    RE: Darya [address]       Dear Colleague,    I had the pleasure of seeing Darya Hamilton in the [facility].    Service Date: 10/21/2020      It is a pleasure for me to see Darya today for followup of cardiomyopathy.  She is a very delightful 25-year-old lady who was consulted on by my colleagues, Darrell Javier and Mario in May of this year.  At that time, she was admitted with multiple metabolic issues.  She had a severe lactic acidosis with a lactic acid level of just over 7.  She had hematemesis secondary to a Kayla-Edwards tear.  She was in acute hypoxic renal failure and she was diagnosed with having moderate to severe protein calorie malnutrition as well.  Some of these metabolic abnormalities may be due to history of alcoholism.  More recently in October she was admitted with alcoholic ketoacidosis.      During hospitalization in May, echocardiogram showed ejection fraction of 20% and she also had intermittent episodes of wide complex tachycardia.  She was seen by Dr. Javier who thought this may be due to atrial tachycardia with aberrancy.  Attempted chemical cardioversion with adenosine was unsuccessful.  However, with correction of her metabolic abnormalities, she has significantly improved.      Most importantly, she tells me that she has completely abstained from alcohol over the past 3 weeks and she has no symptoms such as shortness of breath, chest discomfort, dizzy spells, syncopal episodes.      There is no history of tobacco or any other drug abuse.  She is not diabetic or hypertensive.      Her cardiac examination is completely normal.  EKG done recently was normal as well.  I am also very happy to see that the most recent echocardiogram shows that her ejection fraction has improved to 46% on biplane and there were no major valvular lesions.      I should also mention that she  had chemotherapy as a teenager for Hodgkin lymphoma.  Her physicians at that time were very conscious of potential cardiotoxic effects of chemotherapy and she had been told that she was cured and there were no adverse cardiac effects from her treatment.  I am very inclined to think that her cardiomyopathy diagnosed recently is probably metabolic in origin.  Perhaps there may be a stress-induced element to it and certainly alcohol would be playing a big part.  Again, I am happy to see that she is abstaining from alcohol and there is substantial improvement in ejection fraction.      She does have low blood pressure, but she is asymptomatic.  I am sure another contribution to her low blood pressure would be Toprol-XL 25 mg once daily.  She is on just a very low dose and I would strongly advise continuing to take it for the time being as there is significant evidence that withdrawing treatment would worsen her cardiomyopathy again.      Hopefully, she is well on the road to recovery and I look forward to seeing her again in 6 months' time.  Repeat echocardiogram will be performed prior to clinic visit and I am cautiously optimistic that there would be complete recovery of her left ventricular ejection fraction.         MELIZA PULIDO MD, FACC             D: 10/21/2020   T: 10/21/2020   MT: juan manuel      Name:     JOHN COOLEY   MRN:      [id number]        Account:      [id number]   :      1995           Service Date: 10/21/2020      Document: [id number]          Outpatient Encounter Medications as of 10/21/2020   Medication Sig Dispense Refill     levETIRAcetam (KEPPRA) 500 MG tablet Take 1,000 mg by mouth 2 times daily        metoprolol succinate ER (TOPROL-XL) 25 MG 24 hr tablet Take 1 tablet (25 mg) by mouth daily (Patient not taking: Reported on 10/21/2020) 60 tablet 0     traZODone (DESYREL) 50 MG tablet Take 0.5-1 tablets (25-50 mg) by mouth At Bedtime 60 tablet 1     [DISCONTINUED] acetaminophen  (TYLENOL) 325 MG tablet Take 325-650 mg by mouth every 6 hours as needed for mild pain       [DISCONTINUED] ondansetron (ZOFRAN ODT) 4 MG ODT tab Take 1 tablet (4 mg) by mouth every 8 hours as needed for nausea (Patient not taking: Reported on 10/16/2020) 10 tablet 0     [DISCONTINUED] pantoprazole (PROTONIX) 40 MG EC tablet Take 1 tablet (40 mg) by mouth daily (Patient not taking: Reported on 10/16/2020) 30 tablet 0     No facility-administered encounter medications on file as of 10/21/2020.        Again, thank you for allowing me to participate in the care of your patient.      Sincerely,    DR MELIZA PULIDO MD     [facility]

## 2020-10-21 NOTE — RESULT ENCOUNTER NOTE
Please notify Keppra level is very low subtherapeutic less than 2.  Please advise patient to follow-up with neurology.  Dr. Collins

## 2020-10-21 NOTE — PROGRESS NOTES
Service Date: 10/21/2020      It is a pleasure for me to see Darya today for followup of cardiomyopathy.  She is a very delightful 25-year-old lady who was consulted on by my colleagues, Darrell Javier and Mario in May of this year.  At that time, she was admitted with multiple metabolic issues.  She had a severe lactic acidosis with a lactic acid level of just over 7.  She had hematemesis secondary to a Kayla-Edwards tear.  She was in acute hypoxic renal failure and she was diagnosed with having moderate to severe protein calorie malnutrition as well.  Some of these metabolic abnormalities may be due to history of alcoholism.  More recently in October she was admitted with alcoholic ketoacidosis.      During hospitalization in May, echocardiogram showed ejection fraction of 20% and she also had intermittent episodes of wide complex tachycardia.  She was seen by Dr. Javier who thought this may be due to atrial tachycardia with aberrancy.  Attempted chemical cardioversion with adenosine was unsuccessful.  However, with correction of her metabolic abnormalities, she has significantly improved.      Most importantly, she tells me that she has completely abstained from alcohol over the past 3 weeks and she has no symptoms such as shortness of breath, chest discomfort, dizzy spells, syncopal episodes.      There is no history of tobacco or any other drug abuse.  She is not diabetic or hypertensive.      Her cardiac examination is completely normal.  EKG done recently was normal as well.  I am also very happy to see that the most recent echocardiogram shows that her ejection fraction has improved to 46% on biplane and there were no major valvular lesions.      I should also mention that she had chemotherapy as a teenager for Hodgkin lymphoma.  Her physicians at that time were very conscious of potential cardiotoxic effects of chemotherapy and she had been told that she was cured and there were no adverse cardiac effects from  her treatment.  I am very inclined to think that her cardiomyopathy diagnosed recently is probably metabolic in origin.  Perhaps there may be a stress-induced element to it and certainly alcohol would be playing a big part.  Again, I am happy to see that she is abstaining from alcohol and there is substantial improvement in ejection fraction.      She does have low blood pressure, but she is asymptomatic.  I am sure another contribution to her low blood pressure would be Toprol-XL 25 mg once daily.  She is on just a very low dose and I would strongly advise continuing to take it for the time being as there is significant evidence that withdrawing treatment would worsen her cardiomyopathy again.      Hopefully, she is well on the road to recovery and I look forward to seeing her again in 6 months' time.  Repeat echocardiogram will be performed prior to clinic visit and I am cautiously optimistic that there would be complete recovery of her left ventricular ejection fraction.         MELIZA PULIDO MD, FACC             D: 10/21/2020   T: 10/21/2020   MT: juan manuel      Name:     JOHN COOLEY   MRN:      [id number]        Account:      [id number]   :      1995           Service Date: 10/21/2020      Document: [id number]

## 2020-10-26 DIAGNOSIS — K70.10 ALCOHOLIC HEPATITIS WITHOUT ASCITES (H): ICD-10-CM

## 2020-10-26 DIAGNOSIS — D70.8 OTHER NEUTROPENIA (H): ICD-10-CM

## 2020-10-26 LAB
ALBUMIN SERPL-MCNC: 3.6 G/DL (ref 3.4–5)
ALP SERPL-CCNC: 48 U/L (ref 40–150)
ALT SERPL W P-5'-P-CCNC: 146 U/L (ref 0–50)
AST SERPL W P-5'-P-CCNC: 155 U/L (ref 0–45)
BILIRUB DIRECT SERPL-MCNC: 0.1 MG/DL (ref 0–0.2)
BILIRUB SERPL-MCNC: 0.3 MG/DL (ref 0.2–1.3)
ERYTHROCYTE [DISTWIDTH] IN BLOOD BY AUTOMATED COUNT: 12.7 % (ref 10–15)
HCT VFR BLD AUTO: 40.1 % (ref 35–47)
HGB BLD-MCNC: 13.4 G/DL (ref 11.7–15.7)
MCH RBC QN AUTO: 32.9 PG (ref 26.5–33)
MCHC RBC AUTO-ENTMCNC: 33.4 G/DL (ref 31.5–36.5)
MCV RBC AUTO: 99 FL (ref 78–100)
PLATELET # BLD AUTO: 445 10E9/L (ref 150–450)
PROT SERPL-MCNC: 7.2 G/DL (ref 6.8–8.8)
RBC # BLD AUTO: 4.07 10E12/L (ref 3.8–5.2)
WBC # BLD AUTO: 4.2 10E9/L (ref 4–11)

## 2020-10-26 PROCEDURE — 80076 HEPATIC FUNCTION PANEL: CPT | Performed by: INTERNAL MEDICINE

## 2020-10-26 PROCEDURE — 36415 COLL VENOUS BLD VENIPUNCTURE: CPT | Performed by: INTERNAL MEDICINE

## 2020-10-26 PROCEDURE — 85027 COMPLETE CBC AUTOMATED: CPT | Performed by: INTERNAL MEDICINE

## 2020-10-26 NOTE — TELEPHONE ENCOUNTER
Pt is scheduled with Dr. Washington on 11/12 @ 1:30 @ [facility] . Closing encounter as no further follow up is needed.     Deedee Redd    [facility]

## 2020-10-26 NOTE — RESULT ENCOUNTER NOTE
Darya, is reassuring that your liver enzymes are decreasing but are not back to normal, ALT is 146 down from 487, AST is 155 down from 449, please continue to completely abstain from any alcohol intake and/or excess Tylenol intake.  Please keep well-hydrated, please schedule follow-up with the gastroenterologist that is a liver specialist as discussed on your last visit.  Bilirubin level is normal, total protein level is normal.  Your CBC shows improved white blood cell count; now is normal at 4200 which is reassuring, normal hemoglobin and hematocrit that is no anemia, hemoglobin 13.4 hematocrit 40.1 and normal platelet count.  Repeat your liver function test AST ALT again in another 4 weeks to make sure they are back to normal.  Any further questions please let us know  Dr. Collins

## 2022-09-16 ENCOUNTER — LAB REQUISITION (OUTPATIENT)
Dept: LAB | Facility: CLINIC | Age: 27
End: 2022-09-16

## 2022-09-16 PROCEDURE — 86787 VARICELLA-ZOSTER ANTIBODY: CPT | Performed by: INTERNAL MEDICINE

## 2022-09-16 PROCEDURE — 86481 TB AG RESPONSE T-CELL SUSP: CPT | Performed by: INTERNAL MEDICINE

## 2022-09-18 LAB
QUANTIFERON MITOGEN: 9.41 IU/ML
QUANTIFERON NIL TUBE: 0.05 IU/ML
QUANTIFERON TB1 TUBE: 0.05 IU/ML
QUANTIFERON TB2 TUBE: 0.04

## 2022-09-19 LAB
GAMMA INTERFERON BACKGROUND BLD IA-ACNC: 0.05 IU/ML
M TB IFN-G BLD-IMP: NEGATIVE
M TB IFN-G CD4+ BCKGRND COR BLD-ACNC: 9.36 IU/ML
MITOGEN IGNF BCKGRD COR BLD-ACNC: -0.01 IU/ML
MITOGEN IGNF BCKGRD COR BLD-ACNC: 0 IU/ML
VZV IGG SER QL IA: 10.5 INDEX
VZV IGG SER QL IA: NORMAL

## 2022-09-21 ENCOUNTER — OFFICE VISIT (OUTPATIENT)
Dept: FAMILY MEDICINE | Facility: CLINIC | Age: 27
End: 2022-09-21

## 2022-09-21 VITALS
BODY MASS INDEX: 26.22 KG/M2 | HEART RATE: 100 BPM | OXYGEN SATURATION: 96 % | DIASTOLIC BLOOD PRESSURE: 72 MMHG | SYSTOLIC BLOOD PRESSURE: 110 MMHG | HEIGHT: 63 IN | TEMPERATURE: 97.3 F | WEIGHT: 148 LBS

## 2022-09-21 DIAGNOSIS — R56.9 SEIZURE (H): Primary | ICD-10-CM

## 2022-09-21 DIAGNOSIS — I47.29 PAROXYSMAL VENTRICULAR TACHYCARDIA (H): ICD-10-CM

## 2022-09-21 DIAGNOSIS — I42.9 SECONDARY CARDIOMYOPATHY (H): ICD-10-CM

## 2022-09-21 DIAGNOSIS — C81.90 HODGKIN LYMPHOMA, UNSPECIFIED HODGKIN LYMPHOMA TYPE, UNSPECIFIED BODY REGION (H): ICD-10-CM

## 2022-09-21 DIAGNOSIS — Z23 NEED FOR VARICELLA VACCINE: ICD-10-CM

## 2022-09-21 PROCEDURE — 90716 VAR VACCINE LIVE SUBQ: CPT | Performed by: FAMILY MEDICINE

## 2022-09-21 PROCEDURE — 99215 OFFICE O/P EST HI 40 MIN: CPT | Mod: 25 | Performed by: FAMILY MEDICINE

## 2022-09-21 PROCEDURE — 90471 IMMUNIZATION ADMIN: CPT | Performed by: FAMILY MEDICINE

## 2022-09-21 PROCEDURE — 93000 ELECTROCARDIOGRAM COMPLETE: CPT | Performed by: FAMILY MEDICINE

## 2022-09-21 ASSESSMENT — PAIN SCALES - GENERAL: PAINLEVEL: NO PAIN (0)

## 2022-09-21 NOTE — PROGRESS NOTES
"  Assessment & Plan     Seizure Disorder  Currently not on any medication seizure-free.  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  She is about to receive COVID-vaccine unfortunately sometime COVID has a mild side effect of current myocarditis.  In person with already known some issues with cardiomyopathy I suggested she should get a cardiac evaluation and echocardiogram.  If they are stable and okay she has no contraindication to get COVID-vaccine and she will let us know or go to any clinic to get that done.  At this time I gave her accommodation till 1 month she get that evaluation done if they are stable she can get COVID-vaccine without any issue.  - Adult Cardiology Eval  Referral; Future  - EKG 12-lead complete w/read - Clinics  - Echocardiogram Complete; Future    Paroxysmal ventricular tachycardia (H)    - Adult Cardiology Eval  Referral; Future  - EKG 12-lead complete w/read - Clinics  - Echocardiogram Complete; Future    Need for varicella vaccine  Varicella vaccines ordered she received titers which were low  - VARICELLA/CHICKEN POX VAC LIVE SQ    Hodgkin lymphoma, unspecified Hodgkin lymphoma type, unspecified body region (H) s/p traetment    More than 45 min spent with the patient >half time spent in counseling and coordinating care, discusses diagnose and treatment plan.  Evaluating her chart reviewing her history from different providers.                 BMI:   Estimated body mass index is 26.22 kg/m  as calculated from the following:    Height as of this encounter: 1.6 m (5' 3\").    Weight as of this encounter: 67.1 kg (148 lb).           No follow-ups on file.    Ruben Robertson MD  [facility] ANDRÉS Lackey is a 27 year old, presenting for the following health issues:  Forms (For Job!) and Immunization (Needs covid and chicken pots shot)      History of Present " "Illness       Reason for visit:  Work req.    She eats 2-3 servings of fruits and vegetables daily.She consumes 2 sweetened beverage(s) daily.She exercises with enough effort to increase her heart rate 60 or more minutes per day.  She exercises with enough effort to increase her heart rate 5 days per week. She is missing 1 dose(s) of medications per week.     Patient is a very pleasant 27-year-old female which is not known to our clinic however she has medical history significant from alcoholism and hospitalization with possible secondary cardiomyopathy.  Her ejection fraction was very low initially and she has followed up once in 2020 with some improvement advised to follow-up after 6 months.  She is currently not taking any medication however in the beginning she was prescribed metoprolol.  She is planning to join a job and [employer] which required her to get vaccinated.  She has some titers done for valacyclovir which were low she already got1 vaccines she will need second vaccine today.  She also have a letter for getting a COVID-vaccine or some accommodation.  We do not have a recent cardiac evaluation.  She denies any shortness of breath no chest pain.  She has some tachycardia which she contributed some anxiety whenever she is in the doctor office.  She denies drinking any regular alcohol.  No recent hospitalization.  She has got some care in the [facility] but does not recall going to her cardiologist since initial visit in 2020.            Review of Systems   Constitutional, HEENT, cardiovascular, pulmonary, gi and gu systems are negative, except as otherwise noted.      Objective    /72   Pulse (!) 128   Temp 97.3  F (36.3  C) (Temporal)   Ht 1.6 m (5' 3\")   Wt 67.1 kg (148 lb)   SpO2 96%   BMI 26.22 kg/m    Body mass index is 26.22 kg/m .  Physical Exam   GENERAL: healthy, alert and no distress  NECK: no adenopathy, no asymmetry, masses, or scars and thyroid normal to palpation  RESP: lungs clear to " auscultation - no rales, rhonchi or wheezes  CV: regular rate and rhythm, normal S1 S2, no S3 or S4, no murmur, click or rub, no peripheral edema and peripheral pulses strong  ABDOMEN: soft, nontender, no hepatosplenomegaly, no masses and bowel sounds normal  MS: no gross musculoskeletal defects noted, no edema

## 2022-09-21 NOTE — LETTER
Patient is seen in the clinic 9/21/2022. She has history of cardiomyopathy,no recent follow up with cardiology.  I have suggested her to see cardiology and get evaluation done,if appropriate , she can follow up for start of Covid vaccine , once cardiac evaluation is done.              Ruben Robertson MD

## 2023-11-06 ENCOUNTER — APPOINTMENT (OUTPATIENT)
Dept: GENERAL RADIOLOGY | Facility: CLINIC | Age: 28
End: 2023-11-06
Attending: EMERGENCY MEDICINE
Payer: COMMERCIAL

## 2023-11-06 ENCOUNTER — APPOINTMENT (OUTPATIENT)
Dept: CT IMAGING | Facility: CLINIC | Age: 28
End: 2023-11-06
Attending: EMERGENCY MEDICINE
Payer: COMMERCIAL

## 2023-11-06 ENCOUNTER — HOSPITAL ENCOUNTER (OUTPATIENT)
Facility: CLINIC | Age: 28
Setting detail: OBSERVATION
Discharge: HOME | End: 2023-11-09
Attending: EMERGENCY MEDICINE | Admitting: INTERNAL MEDICINE
Payer: COMMERCIAL

## 2023-11-06 DIAGNOSIS — R11.2 NAUSEA AND VOMITING, UNSPECIFIED VOMITING TYPE: ICD-10-CM

## 2023-11-06 DIAGNOSIS — R10.13 EPIGASTRIC PAIN: ICD-10-CM

## 2023-11-06 LAB
ALBUMIN SERPL BCG-MCNC: 4.7 G/DL (ref 3.5–5.2)
ALBUMIN SERPL BCG-MCNC: 5.2 G/DL (ref 3.5–5.2)
ALP SERPL-CCNC: 69 U/L (ref 35–104)
ALP SERPL-CCNC: ABNORMAL U/L
ALT SERPL W P-5'-P-CCNC: 41 U/L (ref 0–50)
ALT SERPL W P-5'-P-CCNC: ABNORMAL U/L
ANION GAP SERPL CALCULATED.3IONS-SCNC: 23 MMOL/L (ref 7–15)
ANION GAP SERPL CALCULATED.3IONS-SCNC: 23 MMOL/L (ref 7–15)
AST SERPL W P-5'-P-CCNC: 88 U/L (ref 0–45)
AST SERPL W P-5'-P-CCNC: ABNORMAL U/L
BASOPHILS # BLD AUTO: 0 10E3/UL (ref 0–0.2)
BASOPHILS NFR BLD AUTO: 1 %
BILIRUB SERPL-MCNC: 0.9 MG/DL
BILIRUB SERPL-MCNC: 1 MG/DL
BUN SERPL-MCNC: 16.2 MG/DL (ref 6–20)
BUN SERPL-MCNC: 16.5 MG/DL (ref 6–20)
CALCIUM SERPL-MCNC: 10.1 MG/DL (ref 8.6–10)
CALCIUM SERPL-MCNC: 9.4 MG/DL (ref 8.6–10)
CHLORIDE SERPL-SCNC: 101 MMOL/L (ref 98–107)
CHLORIDE SERPL-SCNC: 96 MMOL/L (ref 98–107)
CREAT SERPL-MCNC: 0.61 MG/DL (ref 0.51–0.95)
CREAT SERPL-MCNC: 0.61 MG/DL (ref 0.51–0.95)
DEPRECATED HCO3 PLAS-SCNC: 18 MMOL/L (ref 22–29)
DEPRECATED HCO3 PLAS-SCNC: 21 MMOL/L (ref 22–29)
EGFRCR SERPLBLD CKD-EPI 2021: >90 ML/MIN/1.73M2
EGFRCR SERPLBLD CKD-EPI 2021: >90 ML/MIN/1.73M2
EOSINOPHIL # BLD AUTO: 0 10E3/UL (ref 0–0.7)
EOSINOPHIL NFR BLD AUTO: 1 %
ERYTHROCYTE [DISTWIDTH] IN BLOOD BY AUTOMATED COUNT: 13.2 % (ref 10–15)
ETHANOL SERPL-MCNC: <0.01 G/DL
GLUCOSE SERPL-MCNC: 123 MG/DL (ref 70–99)
GLUCOSE SERPL-MCNC: 128 MG/DL (ref 70–99)
HCT VFR BLD AUTO: 40.5 % (ref 35–47)
HGB BLD-MCNC: 13.5 G/DL (ref 11.7–15.7)
IMM GRANULOCYTES # BLD: 0 10E3/UL
IMM GRANULOCYTES NFR BLD: 0 %
LIPASE SERPL-CCNC: 46 U/L (ref 13–60)
LYMPHOCYTES # BLD AUTO: 1 10E3/UL (ref 0.8–5.3)
LYMPHOCYTES NFR BLD AUTO: 23 %
MCH RBC QN AUTO: 29.9 PG (ref 26.5–33)
MCHC RBC AUTO-ENTMCNC: 33.3 G/DL (ref 31.5–36.5)
MCV RBC AUTO: 90 FL (ref 78–100)
MONOCYTES # BLD AUTO: 0.3 10E3/UL (ref 0–1.3)
MONOCYTES NFR BLD AUTO: 6 %
NEUTROPHILS # BLD AUTO: 2.9 10E3/UL (ref 1.6–8.3)
NEUTROPHILS NFR BLD AUTO: 69 %
NRBC # BLD AUTO: 0 10E3/UL
NRBC BLD AUTO-RTO: 0 /100
PLATELET # BLD AUTO: 263 10E3/UL (ref 150–450)
POTASSIUM SERPL-SCNC: 3.8 MMOL/L (ref 3.4–5.3)
POTASSIUM SERPL-SCNC: 5.1 MMOL/L (ref 3.4–5.3)
PROT SERPL-MCNC: 8 G/DL (ref 6.4–8.3)
PROT SERPL-MCNC: 8.9 G/DL (ref 6.4–8.3)
RBC # BLD AUTO: 4.51 10E6/UL (ref 3.8–5.2)
SODIUM SERPL-SCNC: 140 MMOL/L (ref 135–145)
SODIUM SERPL-SCNC: 142 MMOL/L (ref 135–145)
WBC # BLD AUTO: 4.2 10E3/UL (ref 4–11)

## 2023-11-06 PROCEDURE — 74177 CT ABD & PELVIS W/CONTRAST: CPT

## 2023-11-06 PROCEDURE — 99285 EMERGENCY DEPT VISIT HI MDM: CPT | Mod: 25

## 2023-11-06 PROCEDURE — 250N000011 HC RX IP 250 OP 636: Mod: JZ

## 2023-11-06 PROCEDURE — 96374 THER/PROPH/DIAG INJ IV PUSH: CPT | Mod: 59

## 2023-11-06 PROCEDURE — 250N000011 HC RX IP 250 OP 636: Performed by: EMERGENCY MEDICINE

## 2023-11-06 PROCEDURE — 93005 ELECTROCARDIOGRAM TRACING: CPT

## 2023-11-06 PROCEDURE — 84155 ASSAY OF PROTEIN SERUM: CPT | Performed by: EMERGENCY MEDICINE

## 2023-11-06 PROCEDURE — 80053 COMPREHEN METABOLIC PANEL: CPT | Performed by: EMERGENCY MEDICINE

## 2023-11-06 PROCEDURE — 84155 ASSAY OF PROTEIN SERUM: CPT | Mod: 91 | Performed by: EMERGENCY MEDICINE

## 2023-11-06 PROCEDURE — 250N000011 HC RX IP 250 OP 636: Mod: JZ | Performed by: EMERGENCY MEDICINE

## 2023-11-06 PROCEDURE — 36415 COLL VENOUS BLD VENIPUNCTURE: CPT | Performed by: EMERGENCY MEDICINE

## 2023-11-06 PROCEDURE — 250N000009 HC RX 250: Performed by: EMERGENCY MEDICINE

## 2023-11-06 PROCEDURE — 96376 TX/PRO/DX INJ SAME DRUG ADON: CPT

## 2023-11-06 PROCEDURE — 96361 HYDRATE IV INFUSION ADD-ON: CPT

## 2023-11-06 PROCEDURE — 96375 TX/PRO/DX INJ NEW DRUG ADDON: CPT

## 2023-11-06 PROCEDURE — 71046 X-RAY EXAM CHEST 2 VIEWS: CPT

## 2023-11-06 PROCEDURE — 82077 ASSAY SPEC XCP UR&BREATH IA: CPT | Performed by: EMERGENCY MEDICINE

## 2023-11-06 PROCEDURE — 258N000003 HC RX IP 258 OP 636: Performed by: EMERGENCY MEDICINE

## 2023-11-06 PROCEDURE — 36415 COLL VENOUS BLD VENIPUNCTURE: CPT | Performed by: STUDENT IN AN ORGANIZED HEALTH CARE EDUCATION/TRAINING PROGRAM

## 2023-11-06 PROCEDURE — 83690 ASSAY OF LIPASE: CPT | Performed by: EMERGENCY MEDICINE

## 2023-11-06 PROCEDURE — 93005 ELECTROCARDIOGRAM TRACING: CPT | Mod: 76

## 2023-11-06 PROCEDURE — 85025 COMPLETE CBC W/AUTO DIFF WBC: CPT | Performed by: EMERGENCY MEDICINE

## 2023-11-06 RX ORDER — DROPERIDOL 2.5 MG/ML
1.25 INJECTION, SOLUTION INTRAMUSCULAR; INTRAVENOUS ONCE
Status: COMPLETED | OUTPATIENT
Start: 2023-11-06 | End: 2023-11-06

## 2023-11-06 RX ORDER — ONDANSETRON 2 MG/ML
INJECTION INTRAMUSCULAR; INTRAVENOUS
Status: COMPLETED
Start: 2023-11-06 | End: 2023-11-06

## 2023-11-06 RX ORDER — ONDANSETRON 2 MG/ML
4 INJECTION INTRAMUSCULAR; INTRAVENOUS ONCE
Status: COMPLETED | OUTPATIENT
Start: 2023-11-06 | End: 2023-11-06

## 2023-11-06 RX ORDER — IOPAMIDOL 755 MG/ML
75 INJECTION, SOLUTION INTRAVASCULAR ONCE
Status: COMPLETED | OUTPATIENT
Start: 2023-11-06 | End: 2023-11-06

## 2023-11-06 RX ADMIN — ONDANSETRON 4 MG: 2 INJECTION INTRAMUSCULAR; INTRAVENOUS at 20:29

## 2023-11-06 RX ADMIN — SODIUM CHLORIDE 62 ML: 9 INJECTION, SOLUTION INTRAVENOUS at 21:38

## 2023-11-06 RX ADMIN — IOPAMIDOL 75 ML: 755 INJECTION, SOLUTION INTRAVENOUS at 21:38

## 2023-11-06 RX ADMIN — DROPERIDOL 1.25 MG: 2.5 INJECTION, SOLUTION INTRAMUSCULAR; INTRAVENOUS at 22:17

## 2023-11-06 RX ADMIN — ONDANSETRON 4 MG: 2 INJECTION INTRAMUSCULAR; INTRAVENOUS at 21:03

## 2023-11-06 RX ADMIN — SODIUM CHLORIDE 1000 ML: 9 INJECTION, SOLUTION INTRAVENOUS at 20:27

## 2023-11-06 ASSESSMENT — ACTIVITIES OF DAILY LIVING (ADL)
ADLS_ACUITY_SCORE: 35
ADLS_ACUITY_SCORE: 35

## 2023-11-06 NOTE — LETTER
[facility] EXTENDED RECOVERY AND SHORT STAY  [address] [address]  Phone: [phone]    November 8, 2023        Darya Hamilton  [address]  [place] [address]          To whom it may concern:    RE: Dayra Hamilton    Patient was seen and treated at our hospital from 11/07/2023 to 11/08/2023 and unfortunately missed work.    Patient may return to work on 11/13/2023 with the following:  No working or lifting restrictions    Please contact me for questions or concerns.      Sincerely,            MD ANA ROSA SHORE [facility] Hospitalist  11/08/2023

## 2023-11-07 PROBLEM — R11.2 NAUSEA AND VOMITING, UNSPECIFIED VOMITING TYPE: Status: ACTIVE | Noted: 2023-11-07

## 2023-11-07 PROBLEM — R10.13 EPIGASTRIC PAIN: Status: ACTIVE | Noted: 2023-11-07

## 2023-11-07 LAB
ALBUMIN SERPL BCG-MCNC: 4.8 G/DL (ref 3.5–5.2)
ALP SERPL-CCNC: 64 U/L (ref 35–104)
ALT SERPL W P-5'-P-CCNC: 38 U/L (ref 0–50)
ANION GAP SERPL CALCULATED.3IONS-SCNC: 24 MMOL/L (ref 7–15)
AST SERPL W P-5'-P-CCNC: 75 U/L (ref 0–45)
ATRIAL RATE - MUSE: 119 BPM
ATRIAL RATE - MUSE: 122 BPM
BILIRUB DIRECT SERPL-MCNC: <0.2 MG/DL (ref 0–0.3)
BILIRUB SERPL-MCNC: 0.5 MG/DL
BUN SERPL-MCNC: 15.1 MG/DL (ref 6–20)
CALCIUM SERPL-MCNC: 9.7 MG/DL (ref 8.6–10)
CHLORIDE SERPL-SCNC: 100 MMOL/L (ref 98–107)
CREAT SERPL-MCNC: 0.58 MG/DL (ref 0.51–0.95)
DEPRECATED HCO3 PLAS-SCNC: 18 MMOL/L (ref 22–29)
DIASTOLIC BLOOD PRESSURE - MUSE: NORMAL MMHG
DIASTOLIC BLOOD PRESSURE - MUSE: NORMAL MMHG
EGFRCR SERPLBLD CKD-EPI 2021: >90 ML/MIN/1.73M2
ERYTHROCYTE [DISTWIDTH] IN BLOOD BY AUTOMATED COUNT: 13.4 % (ref 10–15)
GLUCOSE BLDC GLUCOMTR-MCNC: 97 MG/DL (ref 70–99)
GLUCOSE SERPL-MCNC: 159 MG/DL (ref 70–99)
HCG SERPL QL: NEGATIVE
HCT VFR BLD AUTO: 35.4 % (ref 35–47)
HGB BLD-MCNC: 11.7 G/DL (ref 11.7–15.7)
INTERPRETATION ECG - MUSE: NORMAL
INTERPRETATION ECG - MUSE: NORMAL
MCH RBC QN AUTO: 30.2 PG (ref 26.5–33)
MCHC RBC AUTO-ENTMCNC: 33.1 G/DL (ref 31.5–36.5)
MCV RBC AUTO: 91 FL (ref 78–100)
P AXIS - MUSE: 55 DEGREES
P AXIS - MUSE: 65 DEGREES
PLATELET # BLD AUTO: 220 10E3/UL (ref 150–450)
POTASSIUM SERPL-SCNC: 3.9 MMOL/L (ref 3.4–5.3)
PR INTERVAL - MUSE: 162 MS
PR INTERVAL - MUSE: 164 MS
PROT SERPL-MCNC: 8.1 G/DL (ref 6.4–8.3)
QRS DURATION - MUSE: 76 MS
QRS DURATION - MUSE: 80 MS
QT - MUSE: 312 MS
QT - MUSE: 336 MS
QTC - MUSE: 444 MS
QTC - MUSE: 472 MS
R AXIS - MUSE: 37 DEGREES
R AXIS - MUSE: 47 DEGREES
RBC # BLD AUTO: 3.88 10E6/UL (ref 3.8–5.2)
SODIUM SERPL-SCNC: 142 MMOL/L (ref 135–145)
SYSTOLIC BLOOD PRESSURE - MUSE: NORMAL MMHG
SYSTOLIC BLOOD PRESSURE - MUSE: NORMAL MMHG
T AXIS - MUSE: 38 DEGREES
T AXIS - MUSE: 53 DEGREES
VENTRICULAR RATE- MUSE: 119 BPM
VENTRICULAR RATE- MUSE: 122 BPM
WBC # BLD AUTO: 5.1 10E3/UL (ref 4–11)

## 2023-11-07 PROCEDURE — 84703 CHORIONIC GONADOTROPIN ASSAY: CPT | Performed by: EMERGENCY MEDICINE

## 2023-11-07 PROCEDURE — 258N000003 HC RX IP 258 OP 636: Performed by: INTERNAL MEDICINE

## 2023-11-07 PROCEDURE — 85027 COMPLETE CBC AUTOMATED: CPT | Performed by: INTERNAL MEDICINE

## 2023-11-07 PROCEDURE — G0378 HOSPITAL OBSERVATION PER HR: HCPCS

## 2023-11-07 PROCEDURE — 96375 TX/PRO/DX INJ NEW DRUG ADDON: CPT

## 2023-11-07 PROCEDURE — 250N000011 HC RX IP 250 OP 636: Performed by: INTERNAL MEDICINE

## 2023-11-07 PROCEDURE — 250N000011 HC RX IP 250 OP 636: Performed by: EMERGENCY MEDICINE

## 2023-11-07 PROCEDURE — 36415 COLL VENOUS BLD VENIPUNCTURE: CPT | Performed by: INTERNAL MEDICINE

## 2023-11-07 PROCEDURE — 80053 COMPREHEN METABOLIC PANEL: CPT | Performed by: INTERNAL MEDICINE

## 2023-11-07 PROCEDURE — 96376 TX/PRO/DX INJ SAME DRUG ADON: CPT

## 2023-11-07 PROCEDURE — 99222 1ST HOSP IP/OBS MODERATE 55: CPT | Performed by: INTERNAL MEDICINE

## 2023-11-07 PROCEDURE — 250N000013 HC RX MED GY IP 250 OP 250 PS 637: Performed by: INTERNAL MEDICINE

## 2023-11-07 PROCEDURE — 82962 GLUCOSE BLOOD TEST: CPT

## 2023-11-07 PROCEDURE — 250N000011 HC RX IP 250 OP 636: Mod: JZ | Performed by: STUDENT IN AN ORGANIZED HEALTH CARE EDUCATION/TRAINING PROGRAM

## 2023-11-07 RX ORDER — SODIUM CHLORIDE 9 MG/ML
INJECTION, SOLUTION INTRAVENOUS CONTINUOUS
Status: DISCONTINUED | OUTPATIENT
Start: 2023-11-07 | End: 2023-11-09 | Stop reason: HOSPADM

## 2023-11-07 RX ORDER — ONDANSETRON 2 MG/ML
4 INJECTION INTRAMUSCULAR; INTRAVENOUS EVERY 6 HOURS PRN
Status: DISCONTINUED | OUTPATIENT
Start: 2023-11-07 | End: 2023-11-09 | Stop reason: HOSPADM

## 2023-11-07 RX ORDER — PANTOPRAZOLE SODIUM 40 MG/1
40 TABLET, DELAYED RELEASE ORAL DAILY
COMMUNITY
Start: 2023-09-26

## 2023-11-07 RX ORDER — NALOXONE HYDROCHLORIDE 0.4 MG/ML
0.2 INJECTION, SOLUTION INTRAMUSCULAR; INTRAVENOUS; SUBCUTANEOUS
Status: DISCONTINUED | OUTPATIENT
Start: 2023-11-07 | End: 2023-11-09 | Stop reason: HOSPADM

## 2023-11-07 RX ORDER — FAMOTIDINE 20 MG/1
20 TABLET, FILM COATED ORAL 2 TIMES DAILY PRN
COMMUNITY
Start: 2023-09-26

## 2023-11-07 RX ORDER — OXYCODONE HYDROCHLORIDE 5 MG/1
5 TABLET ORAL EVERY 4 HOURS PRN
Status: DISCONTINUED | OUTPATIENT
Start: 2023-11-07 | End: 2023-11-09 | Stop reason: HOSPADM

## 2023-11-07 RX ORDER — LEVETIRACETAM 1000 MG/1
1000 TABLET ORAL DAILY
COMMUNITY

## 2023-11-07 RX ORDER — ACETAMINOPHEN 325 MG/1
650 TABLET ORAL EVERY 6 HOURS PRN
Status: DISCONTINUED | OUTPATIENT
Start: 2023-11-07 | End: 2023-11-09 | Stop reason: HOSPADM

## 2023-11-07 RX ORDER — PROCHLORPERAZINE 25 MG
25 SUPPOSITORY, RECTAL RECTAL EVERY 12 HOURS PRN
Status: DISCONTINUED | OUTPATIENT
Start: 2023-11-07 | End: 2023-11-09 | Stop reason: HOSPADM

## 2023-11-07 RX ORDER — PROCHLORPERAZINE MALEATE 5 MG
10 TABLET ORAL EVERY 6 HOURS PRN
Status: DISCONTINUED | OUTPATIENT
Start: 2023-11-07 | End: 2023-11-09 | Stop reason: HOSPADM

## 2023-11-07 RX ORDER — ACETAMINOPHEN 650 MG/1
650 SUPPOSITORY RECTAL EVERY 6 HOURS PRN
Status: DISCONTINUED | OUTPATIENT
Start: 2023-11-07 | End: 2023-11-09 | Stop reason: HOSPADM

## 2023-11-07 RX ORDER — LORAZEPAM 2 MG/ML
2 INJECTION INTRAMUSCULAR ONCE
Status: COMPLETED | OUTPATIENT
Start: 2023-11-07 | End: 2023-11-07

## 2023-11-07 RX ORDER — ONDANSETRON 4 MG/1
4 TABLET, ORALLY DISINTEGRATING ORAL EVERY 8 HOURS PRN
Status: ON HOLD | COMMUNITY
Start: 2023-10-04 | End: 2023-11-09

## 2023-11-07 RX ORDER — NALOXONE HYDROCHLORIDE 0.4 MG/ML
0.4 INJECTION, SOLUTION INTRAMUSCULAR; INTRAVENOUS; SUBCUTANEOUS
Status: DISCONTINUED | OUTPATIENT
Start: 2023-11-07 | End: 2023-11-09 | Stop reason: HOSPADM

## 2023-11-07 RX ORDER — ONDANSETRON 4 MG/1
4 TABLET, ORALLY DISINTEGRATING ORAL EVERY 6 HOURS PRN
Status: DISCONTINUED | OUTPATIENT
Start: 2023-11-07 | End: 2023-11-09 | Stop reason: HOSPADM

## 2023-11-07 RX ORDER — LEVETIRACETAM 10 MG/ML
1000 INJECTION INTRAVASCULAR DAILY
Status: DISCONTINUED | OUTPATIENT
Start: 2023-11-07 | End: 2023-11-09 | Stop reason: HOSPADM

## 2023-11-07 RX ADMIN — ONDANSETRON 4 MG: 2 INJECTION INTRAMUSCULAR; INTRAVENOUS at 11:15

## 2023-11-07 RX ADMIN — LEVETIRACETAM 1000 MG: 10 INJECTION INTRAVENOUS at 16:23

## 2023-11-07 RX ADMIN — PROCHLORPERAZINE EDISYLATE 10 MG: 5 INJECTION INTRAMUSCULAR; INTRAVENOUS at 14:15

## 2023-11-07 RX ADMIN — SODIUM CHLORIDE 1000 ML: 9 INJECTION, SOLUTION INTRAVENOUS at 14:16

## 2023-11-07 RX ADMIN — ACETAMINOPHEN 650 MG: 325 TABLET, FILM COATED ORAL at 21:36

## 2023-11-07 RX ADMIN — LORAZEPAM 2 MG: 2 INJECTION INTRAMUSCULAR; INTRAVENOUS at 00:15

## 2023-11-07 RX ADMIN — SODIUM CHLORIDE: 9 INJECTION, SOLUTION INTRAVENOUS at 05:33

## 2023-11-07 RX ADMIN — SODIUM CHLORIDE: 9 INJECTION, SOLUTION INTRAVENOUS at 21:32

## 2023-11-07 ASSESSMENT — ACTIVITIES OF DAILY LIVING (ADL)
ADLS_ACUITY_SCORE: 35
ADLS_ACUITY_SCORE: 35
ADLS_ACUITY_SCORE: 31
ADLS_ACUITY_SCORE: 35
ADLS_ACUITY_SCORE: 31
ADLS_ACUITY_SCORE: 35
ADLS_ACUITY_SCORE: 35
ADLS_ACUITY_SCORE: 31
ADLS_ACUITY_SCORE: 35
ADLS_ACUITY_SCORE: 31
ADLS_ACUITY_SCORE: 35
ADLS_ACUITY_SCORE: 35

## 2023-11-07 NOTE — ED PROVIDER NOTES
"  History     Chief Complaint:  Nausea and Tachycardia     The history is provided by the patient.      Darya Hamilton is a 28 year old female with a history of HFrEF, Hodgkin's lymphoma, seizures, ETOH induced hepatitis, and cholelithiasis who presents to the emergency department for nausea and tachycardia. The patient states that for a few days, she has been experiencing a cough and posttussive emesis. She notes that she tested positive for Covid at a provider visit on 10/25/2023 and was sick for a few days prior to that appointment. She adds that she is experiencing nausea, worsening vomiting, and abdominal pain upon retching. She denies pregnancy concern. Denies being able to hold food or fluid down. Denies recent ETOH use other than a \"little bit of wine\" 3 days ago. Denies lower abdominal pain. She adds that 4 years ago, she had an ETOH withdrawal episode that had a different presentation than this episode of nausea and vomiting. Denies drug use. She adds that she is also tachycardic.    Independent Historian:   None - Patient Only    Review of External Notes:   N/A    Medications:    Levetiracetam  Metoprolol  Trazodone    Past Medical History:    HFrEF  Hodgkin's lymphoma  Seizures  Delirium tremens  Lactic acidosis  Hypokalemia  ETOH hepatitis without ascites  Hypophosphatemia  Alcoholic ketoacidosis  Paroxysmal ventricular tachycardia  Cholelithiasis    Past Surgical History:    LAP Cholecystectomy  Portacath placement  Chest tubes for pneumonia as a child    Physical Exam   Patient Vitals for the past 24 hrs:   BP Temp Temp src Pulse Resp SpO2 Height Weight   11/07/23 0146 -- -- -- 116 25 -- -- --   11/07/23 0008 -- -- -- 115 21 -- -- --   11/06/23 2353 (!) 124/95 -- -- 115 21 -- -- --   11/06/23 2308 -- -- -- (!) 121 19 98 % -- --   11/06/23 2304 -- -- -- 117 21 98 % -- --   11/06/23 2215 125/54 -- -- (!) 172 (!) 31 97 % -- --   11/06/23 2046 (!) 126/91 -- -- (!) 122 18 99 % -- --   11/06/23 2011 " "(!) 183/172 97.4  F (36.3  C) Temporal 114 18 99 % 1.6 m (5' 3\") 68 kg (150 lb)      Physical Exam  General: Alert and cooperative with exam. Patient in moderate distress. Normal mentation. Actively vomiting.  Head:  Scalp is NC/AT  Eyes:  No scleral icterus, PERRL  ENT:  The external nose and ears are normal. The oropharynx is normal and without erythema; mucus membranes are moist. Uvula midline, no evidence of deep space infection.  Neck:  Normal range of motion without rigidity.  CV:  Tachycardic and normal rhythm    No pathologic murmur   Resp:  Breath sounds are clear bilaterally    Non-labored, no retractions or accessory muscle use  GI:  Abdomen is soft, no distension, no tenderness. No peritoneal signs  MS:  No lower extremity edema   Skin:  Warm and dry, No rash or lesions noted.  Neuro: Oriented x 3. No gross motor deficits.    Emergency Department Course   ECG  ECG taken at 2012, ECG read at 2015  Sinus tachycardia   No STEMI  No significant change as compared to prior, dated 10/08/20.  Rate 122 bpm. TN interval 164 ms. QRS duration 76 ms. QT/QTc 312/444 ms. P-R-T axes 65 47 53.     ECG 2  ECG taken at 2340, ECG read at 2345  Sinus tachycardia   No STEMI  No significant change as compared to prior, dated 11/06/23.  Rate 119 bpm. TN interval 162 ms. QRS duration 80 ms. QT/QTc 336/472 ms. P-R-T axes 55 37 38.     Imaging:  CT Abdomen Pelvis w Contrast   Final Result   IMPRESSION:    1.  Significant fatty infiltration of the liver.      2.  No appendicitis.      3.  Prominence of the submucosal fat in the right hemicolon compatible with previous inflammation. No evidence for active colitis.      Chest XR,  PA & LAT   Final Result   IMPRESSION: Negative chest.         Read by radiologist.    Laboratory:  Labs Ordered and Resulted from Time of ED Arrival to Time of ED Departure   COMPREHENSIVE METABOLIC PANEL - Abnormal       Result Value    Sodium 140      Potassium 5.1      Carbon Dioxide (CO2) 21 (*)     " Anion Gap 23 (*)     Urea Nitrogen 16.5      Creatinine 0.61      GFR Estimate >90      Calcium 10.1 (*)     Chloride 96 (*)     Glucose 123 (*)     Alkaline Phosphatase        AST        ALT        Protein Total 8.9 (*)     Albumin 5.2      Bilirubin Total 1.0     COMPREHENSIVE METABOLIC PANEL - Abnormal    Sodium 142      Potassium 3.8      Carbon Dioxide (CO2) 18 (*)     Anion Gap 23 (*)     Urea Nitrogen 16.2      Creatinine 0.61      GFR Estimate >90      Calcium 9.4      Chloride 101      Glucose 128 (*)     Alkaline Phosphatase 69      AST 88 (*)     ALT 41      Protein Total 8.0      Albumin 4.7      Bilirubin Total 0.9     LIPASE - Normal    Lipase 46     ETHYL ALCOHOL LEVEL - Normal    Alcohol ethyl <0.01     CBC WITH PLATELETS AND DIFFERENTIAL    WBC Count 4.2      RBC Count 4.51      Hemoglobin 13.5      Hematocrit 40.5      MCV 90      MCH 29.9      MCHC 33.3      RDW 13.2      Platelet Count 263      % Neutrophils 69      % Lymphocytes 23      % Monocytes 6      % Eosinophils 1      % Basophils 1      % Immature Granulocytes 0      NRBCs per 100 WBC 0      Absolute Neutrophils 2.9      Absolute Lymphocytes 1.0      Absolute Monocytes 0.3      Absolute Eosinophils 0.0      Absolute Basophils 0.0      Absolute Immature Granulocytes 0.0      Absolute NRBCs 0.0     HCG QUALITATIVE PREGNANCY      Emergency Department Course & Assessments:    Interventions:  Medications   sodium chloride 0.9% BOLUS 1,000 mL (has no administration in time range)   sodium chloride 0.9% BOLUS 1,000 mL (0 mLs Intravenous Stopped 11/6/23 2220)   ondansetron (ZOFRAN) injection 4 mg (4 mg Intravenous $Given 11/6/23 2029)   ondansetron (ZOFRAN) injection 4 mg (4 mg Intravenous $Given 11/6/23 2103)   Saline (62 mLs As instructed $Given 11/6/23 2138)   iopamidol (ISOVUE-370) solution 75 mL (75 mLs Intravenous $Given 11/6/23 2138)   droPERidol (INAPSINE) injection 1.25 mg (1.25 mg Intravenous $Given 11/6/23 2217)   LORazepam (ATIVAN)  injection 2 mg (2 mg Intravenous $Given 11/7/23 0015)      Assessments:  2151 I obtained history and examined the patient as noted above.   0015 I discussed findings and hospitalization with the patient. All questions answered.     Independent Interpretation (X-rays, CTs, rhythm strip):  I independently interpreted the patient's chest X-ray. I see no evidence of infiltrate.    Consultations/Discussion of Management or Tests:  0029 I spoke with Dr. Ndiaye, hospitalist, regarding the patient. He accepts admission.     Social Determinants of Health affecting care:   None    Disposition:  The patient was admitted to the hospital under the care of Dr. Ndiaye.     Impression & Plan      Medical Decision Making:  Patient is a 28-year-old female who presents with COVID infection diagnosed on 10/25/2023 epigastric pain, and intractable nausea/vomiting which began earlier today.  Patient's medical history and records reviewed.  On evaluation patient is actively vomiting and demonstrates epigastric pain.  She did receive IV fluids and repeated doses of Zofran prior to my assessment though continues to demonstrate intractable nausea/vomiting.  Provided droperidol after EKG obtained.  EKG demonstrates sinus tachycardia.  Chest x-ray without evidence of infiltrate or effusion.  CT abdomen pelvis shows fatty infiltration of liver without appendicitis.  Labs notable for significant elevated anion gap (23; likely secondary to starvation ketosis/dehydration and potentially recent alcohol use) and undetectable alcohol level (concern for potential alcohol withdrawal).  Patient provided Ativan for additional symptom relief.  Given intractable nausea and vomiting as well as concern for potential alcohol withdrawal, will admit to observation with the hospital service for further evaluation and care.    Diagnosis:    ICD-10-CM    1. Epigastric pain  R10.13       2. Nausea and vomiting, unspecified vomiting type  R11.2            Scribe  Disclosure:  I, Willem Josue, am serving as a scribe at 9:34 PM on 11/6/2023 to document services personally performed by Terry Weeks DO based on my observations and the provider's statements to me.     11/6/2023   Terry Weeks Christopher Warren, DO  11/07/23 0335

## 2023-11-07 NOTE — PROGRESS NOTES
PRIMARY Concern: Nausea and vomiting. Tachycardia.    SAFETY RISK Concerns (fall risk, behaviors, etc.): Fall      Isolation/Type: None  Tests/Procedures for NEXT shift: None  Consults? (Pending/following, signed-off?) None  Where is patient from? (Home, TCU, etc.): Home.  Other Important info for NEXT shift: Needs IV Keppra, coming from pharmacy.   Anticipated DC date & active delays: TBD, pending clinical improvement.   ____________________________________________  SUMMARY NOTE:  Orientation/Cognitive: A/OX4  Observation Goals (Met/ Not Met): Not met  Mobility Level/Assist Equipment: SBA  Antibiotics & Plan (IV/po, length of tx left): na  Pain Management: Denies  Tele/VS/O2: Tachy, slightly elevated BP, other VSS on RA  ABNL Lab/BG: AST 75,   Diet: Reg  Bowel/Bladder: Cont  Skin Concerns: WDL  Drains/Devices: PIV infusing IVF as ordered.   Patient Stated Goal for Today: Feel better.

## 2023-11-07 NOTE — ED TRIAGE NOTES
Triage Assessment (Adult)       Row Name 11/06/23 9735          Triage Assessment    Airway WDL WDL        Respiratory WDL    Respiratory WDL WDL;X   Desat to 83% RA, O2 4L/NC put on sats inceased to %        Skin Circulation/Temperature WDL    Skin Circulation/Temperature WDL WDL        Cardiac WDL    Cardiac WDL WDL;X        Cognitive/Neuro/Behavioral WDL    Cognitive/Neuro/Behavioral WDL WDL;X     Arousal Level opens eyes spontaneously     Orientation oriented x 4     Speech clear        Mary Jo Coma Scale    Best Eye Response 4-->(E4) spontaneous     Best Motor Response 6-->(M6) obeys commands     Best Verbal Response 5-->(V5) oriented     Mary Jo Coma Scale Score 15

## 2023-11-07 NOTE — PHARMACY-ADMISSION MEDICATION HISTORY
Pharmacist Admission Medication History    Admission medication history is complete. The information provided in this note is only as accurate as the sources available at the time of the update.    Information Source(s): Patient, Patient's pharmacy, and CareEverywhere/[facility]Scripts via in-person    Pertinent Information: Per patient, last dose were 2-3 days ago due to nausea and vomiting.     Changes made to PTA medication list:  Added: famotidine, pantoprazole, ondansetron,   Deleted: trazodone   Changed: levetiracetam from 1000 mg bid ---> 1000 mg daily     Medication Affordability:  Not including over the counter (OTC) medications, was there a time in the past 3 months when you did not take your medications as prescribed because of cost?: No    Allergies reviewed with patient and updates made in EHR: yes    Medication History Completed By: Dewey Palmer, PharmD 11/7/2023 9:23 AM    Prior to Admission medications    Medication Sig Last Dose Taking? Auth Provider Long Term End Date   famotidine (PEPCID) 20 MG tablet Take 20 mg by mouth 2 times daily as needed 11/4/2023 Yes Unknown, Entered By History     levETIRAcetam (KEPPRA) 1000 MG tablet Take 1,000 mg by mouth daily 11/4/2023 Yes Unknown, Entered By History No    metoprolol succinate ER (TOPROL-XL) 25 MG 24 hr tablet Take 1 tablet (25 mg) by mouth daily 11/4/2023 Yes Lucas Moreno MD Yes    ondansetron (ZOFRAN ODT) 4 MG ODT tab Place 4 mg under the tongue every 8 hours as needed PRN Yes Unknown, Entered By History     pantoprazole (PROTONIX) 40 MG EC tablet Take 40 mg by mouth daily PRN Yes Unknown, Entered By History

## 2023-11-07 NOTE — PROGRESS NOTES
RECEIVING UNIT ED HANDOFF REVIEW    ED Nurse Handoff Report was reviewed by: Rudy Cano RN on November 7, 2023 at 1:13 PM

## 2023-11-07 NOTE — H&P
[facility]    History and Physical - Hospitalist Service       Date of Admission:  11/6/2023     Assessment & Plan      Darya Hamilton is a 28 year old female with a history of Hodgkin's lymphoma, HFrEF, seizures, SVT, alcohol aubse who is admitted on 11/6/2023 with nausea and vomiting    Nausea and vomiting  Etiology for this is unclear but abdominal CT is negative for acute abnnormality. Lipase normal at 46. Patient denies substantial recent use of alcohol but alcohol withdrawal is on the differential. alcoholic gastritis and viral gastroenteritis possible. Keppra could cause nausea but other minimal meds are unlikely. Pregnancy could be on the differential. Note history this year of recurrent presentations to the ED with occasional admission to the hospital for recurrent, intractable nausea and vomiting.   -supportive cares for now with IVF and antiemetics  -pregnancy test pending  -repeat BMP, CBC, LFTs in the morning  -consider outpatient follow up with GI, potentially here if not improving    Mild metabolic acidosis with elevated anion gap.   Bicarb of 18, anion gap of 23. Suspect due to starvation ketosis and dehydration.   -continue IVF  -repeat BMP in the morning    Seizure disorder  -continue keppra    Hepatic steatosis  Noted on CT. AST elevated to 88 suggesting ongoing alcohol use.   -lifestyle modification  -follow up with PCP    COVID-19  Diagnosed 10/25/23, symptoms improved. considered COVID recovered as of 11/4/23.   -monitor respiratory status    HFrEF  Most recent EF of 46% in October of 2020, improved from 20-25% in May of 2020. Doesn't appear to have any volume overload at this time. Last cardiology visit was in 2020 after which she was lost to follow up.  -await med rec but appears to be on metoprolol  -monitor volume status  -encourage follow up with cardiology    SVT  Per history. note tachycardia on presentation which is likely due in part to dehydration as  "well as stress response to acute illness.   -monitor on tele  -IVF  -continue metoprolol    Hodgkin's lymphoma  S/p chemotherapy per previous note in chart review though details about this are unclear.        Diet:  Regular  DVT Prophylaxis: Ambulate every shift  Code Status:  Full    Disposition: Obs pending improvement in GI symptoms    Clinically Significant Risk Factors Present on Admission             # Anion Gap Metabolic Acidosis: Highest Anion Gap = 23 mmol/L in last 2 days, will monitor and treat as appropriate               # Overweight: Estimated body mass index is 26.57 kg/m  as calculated from the following:    Height as of this encounter: 1.6 m (5' 3\").    Weight as of this encounter: 68 kg (150 lb).               Celso Ndiaye DO  Hospitalist Service  [facility]  Securely message with [employer] (more info)  Text page via [employer] Paging/Directory     ______________________________________________________________________    Chief Complaint   Nausea and vomiting    History is obtained from the patient and ED physician    History of Present Illness   Darya Hamilton is a 28 year old female who has a history of Hodgkin's lymphoma, HFrEF, seizures, alcohol use who presents to the ED with nausea and vomiting. She was diagnosed with COVID on 10/25,  main symptoms being cough, runny nose, sore throat. Her symptoms all improved though she has had a runny nose the past couple days. her main complaint is of nausea and abdominal pain for the past 2 days. she notes nonbilous, non bloody emesis with generalized abdominal pain which is worse in the epigastric region. Food does not seem to make the pain worse. She has not had any diarrhea so far. no blood in her stool or urine. no chest pain, cough or shortness of breath. In the ED she was noted to be tachycardic which responded to IVF bolus. she had a CT of the abdomen which did not show any acute abnormalities but did show hepatic " steatosis. She was had improvement in her nausea after droperidol and eventually got some ativan as well. When I saw her she was more somnolent. In review of care everywhere she has had multiple admissions and ED visits this year with similar complaints of recurrent, intractable nausea and vomiting which usually resolve with supportive cares. She also had an admission in May with alcohol withdrawal.     she has a history of alcohol use though tells me her last drink was 3 days ago when she had a gouple glasses of wine. she is not drinking regularly before that. she has had alcohol withdrawal in the past. she does have a seizure disorder and is taking her keppra. She also has a history of HFrEF with most recent TTE in October 2020 showing EF of 46% which is improved from 20-25% in May of 2020. Patient does not recall the history of her cardiomyopathy. she denies any LE swelling or weight gain. No sick contacts. no chest pain or shortness of breath currently. she did not feel any palpitations. I do note a history of SVT.       Past Medical History    Past Medical History:   Diagnosis Date    HFrEF (heart failure with reduced ejection fraction) (H) 05/2020    EF 20-25%, likely stress-induced cardiomyopathy    Hodgkin's lymphoma (H) 2010    in remission since chemotherapy    Seizures (H)        Past Surgical History   Past Surgical History:   Procedure Laterality Date    CHOLECYSTECTOMY         Prior to Admission Medications   Prior to Admission Medications   Prescriptions Last Dose Informant Patient Reported? Taking?   levETIRAcetam (KEPPRA) 500 MG tablet  Self Yes No   Sig: Take 1,000 mg by mouth 2 times daily    metoprolol succinate ER (TOPROL-XL) 25 MG 24 hr tablet   No No   Sig: Take 1 tablet (25 mg) by mouth daily   traZODone (DESYREL) 50 MG tablet   Yes No   Sig: Take 0.5-1 tablets (25-50 mg) by mouth At Bedtime      Facility-Administered Medications: None        Review of Systems    The 10 point Review of  Systems is negative other than noted in the HPI or here.      Physical Exam   Vital Signs: Temp: 97.4  F (36.3  C) Temp src: Temporal BP: (!) 124/95 Pulse: 115   Resp: 21 SpO2: 98 % O2 Device: None (Room air)    Weight: 150 lbs 0 oz    Gen: lying in bed, somnolent  CV: tachycardic, regular, no m/r/g  Pulm: CTAB, no wheeze or rhonchi  GI: +BS, soft mild tenderness diffusely, most prominent in the epigastric regoin  Lymph: no edema    Medical Decision Making       70 MINUTES SPENT BY ME on the date of service doing chart review, history, exam, documentation & further activities per the note.      Data     I have personally reviewed the following data over the past 24 hrs:    4.2  \   13.5   / 263     142 101 16.2 /  128 (H)   3.8 18 (L) 0.61 \     ALT: 41 AST: 88 (H) AP: 69 TBILI: 0.9   ALB: 4.7 TOT PROTEIN: 8.0 LIPASE: 46       Imaging results reviewed over the past 24 hrs:   Recent Results (from the past 24 hour(s))   Chest XR,  PA & LAT    Narrative    EXAM: XR CHEST 2 VIEWS  LOCATION: [facility]  DATE: 11/6/2023    INDICATION: Cough with posttussive emesis  COMPARISON: 05/04/2020      Impression    IMPRESSION: Negative chest.   CT Abdomen Pelvis w Contrast    Narrative    EXAM: CT ABDOMEN PELVIS W CONTRAST  LOCATION: [facility]  DATE: 11/6/2023    INDICATION: Recent COVID+ 10 25, now with significant vomiting.  COMPARISON: 10/08/2020  TECHNIQUE: CT scan of the abdomen and pelvis was performed following injection of IV contrast. Multiplanar reformats were obtained. Dose reduction techniques were used.  CONTRAST: 75mL Isovue 370    FINDINGS:   LOWER CHEST: Normal.    HEPATOBILIARY: Significant fatty infiltration of the liver.    PANCREAS: Normal.    SPLEEN: Normal.    ADRENAL GLANDS: Normal.    KIDNEYS/BLADDER: Normal.    BOWEL: Prominent submucosal fat in the right hemicolon compatible with previous inflammation. No evidence for active colitis. No  appendicitis.    LYMPH NODES: Normal.    VASCULATURE: Unremarkable.    PELVIC ORGANS: Normal.    MUSCULOSKELETAL: Normal.      Impression    IMPRESSION:   1.  Significant fatty infiltration of the liver.    2.  No appendicitis.    3.  Prominence of the submucosal fat in the right hemicolon compatible with previous inflammation. No evidence for active colitis.

## 2023-11-07 NOTE — ED NOTES
[facility]    ED Nurse Handoff Report    ED Chief complaint: Nausea and Tachycardia      ED Diagnosis:   Final diagnoses:   Epigastric pain   Nausea and vomiting, unspecified vomiting type       Code Status: Full Code    Allergies:   Allergies   Allergen Reactions    Asa [Aspirin] Angioedema and Hives    Hydrocodone      Hallucinations       Patient Story:  Pt arrives to ED with nausea, vomiting, and tachycardia. Vomiting has not been controlled in the ER.     Focused Assessment:    Pt is A&Ox4, independent, can make needs known. History of alcohol abuse, withdrawals and seizures. Had previously quit drinking however drank again 3 days ago. Pt given Ativan which has helped the pt rest and stop vomiting.     Labs Ordered and Resulted from Time of ED Arrival to Time of ED Departure   COMPREHENSIVE METABOLIC PANEL - Abnormal       Result Value    Sodium 140      Potassium 5.1      Carbon Dioxide (CO2) 21 (*)     Anion Gap 23 (*)     Urea Nitrogen 16.5      Creatinine 0.61      GFR Estimate >90      Calcium 10.1 (*)     Chloride 96 (*)     Glucose 123 (*)     Alkaline Phosphatase        AST        ALT        Protein Total 8.9 (*)     Albumin 5.2      Bilirubin Total 1.0     COMPREHENSIVE METABOLIC PANEL - Abnormal    Sodium 142      Potassium 3.8      Carbon Dioxide (CO2) 18 (*)     Anion Gap 23 (*)     Urea Nitrogen 16.2      Creatinine 0.61      GFR Estimate >90      Calcium 9.4      Chloride 101      Glucose 128 (*)     Alkaline Phosphatase 69      AST 88 (*)     ALT 41      Protein Total 8.0      Albumin 4.7      Bilirubin Total 0.9     LIPASE - Normal    Lipase 46     ETHYL ALCOHOL LEVEL - Normal    Alcohol ethyl <0.01     CBC WITH PLATELETS AND DIFFERENTIAL    WBC Count 4.2      RBC Count 4.51      Hemoglobin 13.5      Hematocrit 40.5      MCV 90      MCH 29.9      MCHC 33.3      RDW 13.2      Platelet Count 263      % Neutrophils 69      % Lymphocytes 23      % Monocytes 6      % Eosinophils 1       % Basophils 1      % Immature Granulocytes 0      NRBCs per 100 WBC 0      Absolute Neutrophils 2.9      Absolute Lymphocytes 1.0      Absolute Monocytes 0.3      Absolute Eosinophils 0.0      Absolute Basophils 0.0      Absolute Immature Granulocytes 0.0      Absolute NRBCs 0.0     HCG QUALITATIVE PREGNANCY       CT Abdomen Pelvis w Contrast   Final Result   IMPRESSION:    1.  Significant fatty infiltration of the liver.      2.  No appendicitis.      3.  Prominence of the submucosal fat in the right hemicolon compatible with previous inflammation. No evidence for active colitis.      Chest XR,  PA & LAT   Final Result   IMPRESSION: Negative chest.            Treatments and/or interventions provided:      Medications   sodium chloride 0.9% BOLUS 1,000 mL (has no administration in time range)   sodium chloride 0.9% BOLUS 1,000 mL (0 mLs Intravenous Stopped 11/6/23 2220)   ondansetron (ZOFRAN) injection 4 mg (4 mg Intravenous $Given 11/6/23 2029)   ondansetron (ZOFRAN) injection 4 mg (4 mg Intravenous $Given 11/6/23 2103)   Saline (62 mLs As instructed $Given 11/6/23 2138)   iopamidol (ISOVUE-370) solution 75 mL (75 mLs Intravenous $Given 11/6/23 2138)   droPERidol (INAPSINE) injection 1.25 mg (1.25 mg Intravenous $Given 11/6/23 2217)   LORazepam (ATIVAN) injection 2 mg (2 mg Intravenous $Given 11/7/23 0015)       Patient's response to treatments and/or interventions:    Pt resting. Has not vomited since getting the ativan    To be done/followed up on inpatient unit:   See any in-patient orders    Does this patient have any cognitive concerns?:     Activity level - Baseline/Home:    Independent    Activity Level - Current:    Independent    Patient's Preferred language: English     Needed?: No    Isolation: None  Infection: Not Applicable COVID positive 10/25/23. No current symptoms. COVID recovered  Patient tested for COVID 19 prior to admission: COVID recovered    Bariatric?: No    Vital Signs:    Vitals:    11/06/23 2308 11/06/23 2353 11/07/23 0008 11/07/23 0146   BP:  (!) 124/95     BP Location:       Patient Position:       Cuff Size:       Pulse: (!) 121 115 115 116   Resp: 19 21 21 25   Temp:       TempSrc:       SpO2: 98%      Weight:       Height:           Cardiac Rhythm: sinus tachycardic    Was the PSS-3 completed:   Yes  What interventions are required if any?               Family Comments: none at bedside  OBS brochure/video discussed/provided to patient/family: Yes              Name of person given brochure if not patient:               Relationship to patient:     For the majority of the shift this patient's behavior was Green.  Behavioral interventions performed were .    ED NURSE PHONE NUMBER: [phone]

## 2023-11-08 LAB — GLUCOSE BLDC GLUCOMTR-MCNC: 91 MG/DL (ref 70–99)

## 2023-11-08 PROCEDURE — 96376 TX/PRO/DX INJ SAME DRUG ADON: CPT

## 2023-11-08 PROCEDURE — 250N000011 HC RX IP 250 OP 636: Mod: JZ | Performed by: STUDENT IN AN ORGANIZED HEALTH CARE EDUCATION/TRAINING PROGRAM

## 2023-11-08 PROCEDURE — 99239 HOSP IP/OBS DSCHRG MGMT >30: CPT | Performed by: STUDENT IN AN ORGANIZED HEALTH CARE EDUCATION/TRAINING PROGRAM

## 2023-11-08 PROCEDURE — 258N000003 HC RX IP 258 OP 636: Performed by: INTERNAL MEDICINE

## 2023-11-08 PROCEDURE — 250N000013 HC RX MED GY IP 250 OP 250 PS 637: Performed by: INTERNAL MEDICINE

## 2023-11-08 PROCEDURE — 250N000011 HC RX IP 250 OP 636: Performed by: INTERNAL MEDICINE

## 2023-11-08 PROCEDURE — G0378 HOSPITAL OBSERVATION PER HR: HCPCS

## 2023-11-08 PROCEDURE — 96375 TX/PRO/DX INJ NEW DRUG ADDON: CPT

## 2023-11-08 PROCEDURE — 999N000127 HC STATISTIC PERIPHERAL IV START W US GUIDANCE

## 2023-11-08 PROCEDURE — 82962 GLUCOSE BLOOD TEST: CPT

## 2023-11-08 RX ORDER — METOCLOPRAMIDE HYDROCHLORIDE 5 MG/ML
10 INJECTION INTRAMUSCULAR; INTRAVENOUS EVERY 6 HOURS PRN
Status: DISCONTINUED | OUTPATIENT
Start: 2023-11-08 | End: 2023-11-09 | Stop reason: HOSPADM

## 2023-11-08 RX ADMIN — SODIUM CHLORIDE: 9 INJECTION, SOLUTION INTRAVENOUS at 05:50

## 2023-11-08 RX ADMIN — LEVETIRACETAM 1000 MG: 10 INJECTION INTRAVENOUS at 09:27

## 2023-11-08 RX ADMIN — ONDANSETRON 4 MG: 2 INJECTION INTRAMUSCULAR; INTRAVENOUS at 04:20

## 2023-11-08 RX ADMIN — PROCHLORPERAZINE EDISYLATE 10 MG: 5 INJECTION INTRAMUSCULAR; INTRAVENOUS at 09:27

## 2023-11-08 RX ADMIN — ONDANSETRON 4 MG: 4 TABLET, ORALLY DISINTEGRATING ORAL at 17:46

## 2023-11-08 RX ADMIN — PROCHLORPERAZINE MALEATE 10 MG: 5 TABLET ORAL at 19:41

## 2023-11-08 RX ADMIN — SODIUM CHLORIDE: 9 INJECTION, SOLUTION INTRAVENOUS at 16:02

## 2023-11-08 RX ADMIN — SODIUM CHLORIDE: 9 INJECTION, SOLUTION INTRAVENOUS at 21:58

## 2023-11-08 RX ADMIN — METOCLOPRAMIDE 10 MG: 5 INJECTION, SOLUTION INTRAMUSCULAR; INTRAVENOUS at 20:47

## 2023-11-08 ASSESSMENT — ACTIVITIES OF DAILY LIVING (ADL)
ADLS_ACUITY_SCORE: 31

## 2023-11-08 NOTE — PROGRESS NOTES
Observation Goals  Nausea/vomiting improved=Not met  labs improved=Met   tolerating po=Not Met   tachycardia resolved=Not Met  Nurse to notify provider when observation goals have been met and patient is ready for discharge.

## 2023-11-08 NOTE — PROGRESS NOTES
Observation Goals  Nausea/vomiting improved=Met  labs improved=Met   tolerating po=Met   tachycardia resolved=Not Met  Nurse to notify provider when observation goals have been met and patient is ready for discharge.

## 2023-11-08 NOTE — PROGRESS NOTES
Top portion must ALWAYS be completed  PRIMARY Concern: Nausea and Vomiting  SAFETY RISK Concerns (fall risk, behaviors, etc.): green      Isolation/Type: None  Tests/Procedures for NEXT shift: None  Consults? (Pending/following, signed-off?) None  Where is patient from? (Home, TCU, etc.): Home  Other Important info for NEXT shift: Vomit x1, prn Zofran given.  Anticipated DC date & active delays: TBD  _____________________________________________________________________________  SUMMARY NOTE:              (either paste note here OR complete the info below- RN to choose one- delete info below if not used)  Orientation/Cognitive: A/O x4  Observation Goals (Met/ Not Met): Not met  Mobility Level/Assist Equipment: indep  Antibiotics & Plan (IV/po, length of tx left): None  Pain Management: Tylenol and Oxy  Tele/VS/O2: Tele on, VSS on RA  ABNL Lab/BG: AST  Diet: Regular  Bowel/Bladder: Continent  Skin Concerns: None  Drains/Devices: IV NS infusing @ 125ml/hr  Patient Stated Goal for Today: No nausea/vomiting

## 2023-11-08 NOTE — PROGRESS NOTES
Observation goals  PRIOR TO DISCHARGE       Comments:   Nausea/vomiting improved: Partially met     labs improved: Met     tolerating po: Not met      tachycardia resolved: Partially met     Nurse to notify provider when observation goals have been met and patient is ready for discharge.

## 2023-11-08 NOTE — PROGRESS NOTES
PRIMARY Concern: Nausea and vomiting. Tachycardia.    SAFETY RISK Concerns (fall risk, behaviors, etc.): None      Isolation/Type: None  Tests/Procedures for NEXT shift: None  Consults? (Pending/following, signed-off?) None  Where is patient from? (Home, TCU, etc.): Home.  Other Important info for NEXT shift: Checking BG periodically as patient has poor appetite   Anticipated DC date & active delays: TBD, pending clinical improvement.   ____________________________________________  SUMMARY NOTE:  Orientation/Cognitive: A/OX4  Observation Goals (Met/ Not Met): Not met  Mobility Level/Assist Equipment: Ind  Antibiotics & Plan (IV/po, length of tx left): na  Pain Management: PRN tylenol given x1 for abd pain  Tele/VS/O2: Tachy, slightly elevated BP, other VSS on RA  ABNL Lab/BG: AST 75,   Diet: Reg, tolerating clears so far with no N/V  Bowel/Bladder: Cont  Skin Concerns: WDL  Drains/Devices: PIV infusing IVF as ordered.   Patient Stated Goal for Today: Feel better.

## 2023-11-08 NOTE — DISCHARGE SUMMARY
"[facility]  Hospitalist Discharge Summary      Date of Admission:  11/6/2023  Date of Discharge:  11/9/2023  Discharging Provider: Lucas Moreno MD  Discharge Service: Hospitalist Service    Discharge Diagnoses     Nausea and vomiting     Clinically Significant Risk Factors     # Overweight: Estimated body mass index is 26.57 kg/m  as calculated from the following:    Height as of this encounter: 1.6 m (5' 3\").    Weight as of this encounter: 68 kg (150 lb).       Follow-ups Needed After Discharge   Follow-up Appointments     Follow-up and recommended labs and tests       Follow up with primary care provider, Physician No Ref-Primary, within 7   days for hospital follow- up.  No follow up labs or test are needed.            Unresulted Labs Ordered in the Past 30 Days of this Admission       No orders found from 10/7/2023 to 11/7/2023.            Discharge Disposition   Discharged to home  Condition at discharge: Stable    Hospital Course     Darya Hamilton is a 28 year old female with a history of Hodgkin's lymphoma, HFrEF, seizures, SVT, alcohol aubse who is admitted on 11/6/2023 with nausea and vomiting     Nausea and vomiting  Etiology for this is unclear but abdominal CT is negative for acute abnnormality. Lipase normal at 46. Patient denies substantial recent use of alcohol but alcohol withdrawal is on the differential. alcoholic gastritis and viral gastroenteritis possible. Keppra could cause nausea but other minimal meds are unlikely. Pregnancy could be on the differential. Note history this year of recurrent presentations to the ED with occasional admission to the hospital for recurrent, intractable nausea and vomiting.   -Symptoms improved with supportive cares for now with IVF and antiemetics  -pregnancy test negative  -Suspect this is secondary to gastroenteritis / gastritis / alcohol abuse     Mild metabolic acidosis with elevated anion gap.   Bicarb of 18, anion gap of 23. " Suspect due to starvation ketosis and dehydration.      Seizure disorder  -continue keppra     Hepatic steatosis  Noted on CT. AST elevated to 88 suggesting ongoing alcohol use.   -lifestyle modification  -follow up with PCP     COVID-19  Diagnosed 10/25/23, symptoms improved. considered COVID recovered as of 11/4/23.     HFrEF  Most recent EF of 46% in October of 2020, improved from 20-25% in May of 2020. Doesn't appear to have any volume overload at this time. Last cardiology visit was in 2020 after which she was lost to follow up.  - Continue PTA BB     SVT  Per history. note tachycardia on presentation which is likely due in part to dehydration as well as stress response to acute illness.   Plan:  -continue metoprolol     Hodgkin's lymphoma  S/p chemotherapy per previous note in chart review though details about this are unclear.        Consultations This Hospital Stay   VASCULAR ACCESS ADULT IP CONSULT    Code Status   Full Code    Time Spent on this Encounter   I, Lucas Moreno MD, personally saw the patient today and spent greater than 30 minutes discharging this patient.       Lucas Moreno MD  [facility] EXTENDED RECOVERY AND SHORT STAY  [address] [place] [id number]  Phone: [phone]  ______________________________________________________________________    Physical Exam   Vital Signs: Temp: 98.5  F (36.9  C) Temp src: Oral BP: (!) 132/92 Pulse: 96   Resp: 18 SpO2: 99 % O2 Device: None (Room air)    Weight: 150 lbs 0 oz  ----------------------------------------------------------------------------------------       Primary Care Physician   Physician No Ref-Primary    Discharge Orders      Reason for your hospital stay    You had nausea / vomiting likely from gastroenteritis.     Follow-up and recommended labs and tests     Follow up with primary care provider, Physician No Ref-Primary, within 7 days for hospital follow- up.  No follow up labs or test are needed.     Activity     Your activity upon discharge: activity as tolerated     Diet    Follow this diet upon discharge: Orders Placed This Encounter      Advance Diet as Tolerated: Clear Liquid Diet       Significant Results and Procedures   Most Recent 3 CBC's:  Recent Labs   Lab Test 11/07/23  0609 11/06/23  2024 10/26/20  0948   WBC 5.1 4.2 4.2   HGB 11.7 13.5 13.4   MCV 91 90 99    263 445     Most Recent 3 BMP's:  Recent Labs   Lab Test 11/07/23  1855 11/07/23  0609 11/06/23 2112 11/06/23 2024   NA  --  142 142 140   POTASSIUM  --  3.9 3.8 5.1   CHLORIDE  --  100 101 96*   CO2  --  18* 18* 21*   BUN  --  15.1 16.2 16.5   CR  --  0.58 0.61 0.61   ANIONGAP  --  24* 23* 23*   DONOVAN  --  9.7 9.4 10.1*   GLC 97 159* 128* 123*     Most Recent 2 LFT's:  Recent Labs   Lab Test 11/07/23  0609 11/06/23 2112   AST 75* 88*   ALT 38 41   ALKPHOS 64 69   BILITOTAL 0.5 0.9     Most Recent 3 INR's:  Recent Labs   Lab Test 10/16/20  1416 05/03/20  0145   INR 0.96 1.26*     Most Recent 3 Troponin's:  Recent Labs   Lab Test 10/08/20  0911 10/07/20  2224 05/04/20  0535   TROPI <0.015 <0.015 0.445*     Most Recent 3 BNP's:  Recent Labs   Lab Test 10/07/20  2224   NTBNPI 16     Most Recent D-dimer:No lab results found.  Most Recent Cholesterol Panel:No lab results found.  7-Day Micro Results       No results found for the last 168 hours.          Most Recent Urinalysis:  Recent Labs   Lab Test 10/08/20  0109   COLOR Yellow   APPEARANCE Slightly Cloudy   URINEGLC Negative   URINEBILI Negative   URINEKETONE >150*   SG 1.034   UBLD Small*   URINEPH 5.5   PROTEIN 100*   NITRITE Negative   LEUKEST Negative   RBCU <1   WBCU 0   ,   Results for orders placed or performed during the hospital encounter of 11/06/23   Chest XR,  PA & LAT    Narrative    EXAM: XR CHEST 2 VIEWS  LOCATION: [facility]  DATE: 11/6/2023    INDICATION: Cough with posttussive emesis  COMPARISON: 05/04/2020      Impression    IMPRESSION: Negative chest.    CT Abdomen Pelvis w Contrast    Narrative    EXAM: CT ABDOMEN PELVIS W CONTRAST  LOCATION: [facility]  DATE: 11/6/2023    INDICATION: Recent COVID+ 10 25, now with significant vomiting.  COMPARISON: 10/08/2020  TECHNIQUE: CT scan of the abdomen and pelvis was performed following injection of IV contrast. Multiplanar reformats were obtained. Dose reduction techniques were used.  CONTRAST: 75mL Isovue 370    FINDINGS:   LOWER CHEST: Normal.    HEPATOBILIARY: Significant fatty infiltration of the liver.    PANCREAS: Normal.    SPLEEN: Normal.    ADRENAL GLANDS: Normal.    KIDNEYS/BLADDER: Normal.    BOWEL: Prominent submucosal fat in the right hemicolon compatible with previous inflammation. No evidence for active colitis. No appendicitis.    LYMPH NODES: Normal.    VASCULATURE: Unremarkable.    PELVIC ORGANS: Normal.    MUSCULOSKELETAL: Normal.      Impression    IMPRESSION:   1.  Significant fatty infiltration of the liver.    2.  No appendicitis.    3.  Prominence of the submucosal fat in the right hemicolon compatible with previous inflammation. No evidence for active colitis.       Discharge Medications   Current Discharge Medication List        CONTINUE these medications which have NOT CHANGED    Details   famotidine (PEPCID) 20 MG tablet Take 20 mg by mouth 2 times daily as needed      levETIRAcetam (KEPPRA) 1000 MG tablet Take 1,000 mg by mouth daily      metoprolol succinate ER (TOPROL-XL) 25 MG 24 hr tablet Take 1 tablet (25 mg) by mouth daily  Qty: 60 tablet, Refills: 0    Associated Diagnoses: Cardiomyopathy due metabolic or nutritional disease (H)      ondansetron (ZOFRAN ODT) 4 MG ODT tab Place 4 mg under the tongue every 8 hours as needed      pantoprazole (PROTONIX) 40 MG EC tablet Take 40 mg by mouth daily           Allergies   Allergies   Allergen Reactions    Asa [Aspirin] Angioedema and Hives    Hydrocodone      Hallucinations

## 2023-11-08 NOTE — PROGRESS NOTES
Observation goals  PRIOR TO DISCHARGE       Comments:   Nausea/vomiting improved: Not met    labs improved: Met    tolerating po: Not met     tachycardia resolved: Not met    Nurse to notify provider when observation goals have been met and patient is ready for discharge.

## 2023-11-09 VITALS
BODY MASS INDEX: 26.58 KG/M2 | HEIGHT: 63 IN | OXYGEN SATURATION: 100 % | SYSTOLIC BLOOD PRESSURE: 128 MMHG | DIASTOLIC BLOOD PRESSURE: 96 MMHG | TEMPERATURE: 98.2 F | RESPIRATION RATE: 16 BRPM | HEART RATE: 103 BPM | WEIGHT: 150 LBS

## 2023-11-09 LAB — GLUCOSE BLDC GLUCOMTR-MCNC: 96 MG/DL (ref 70–99)

## 2023-11-09 PROCEDURE — 96376 TX/PRO/DX INJ SAME DRUG ADON: CPT

## 2023-11-09 PROCEDURE — 82962 GLUCOSE BLOOD TEST: CPT

## 2023-11-09 PROCEDURE — 250N000011 HC RX IP 250 OP 636: Mod: JZ | Performed by: STUDENT IN AN ORGANIZED HEALTH CARE EDUCATION/TRAINING PROGRAM

## 2023-11-09 PROCEDURE — 99232 SBSQ HOSP IP/OBS MODERATE 35: CPT | Performed by: STUDENT IN AN ORGANIZED HEALTH CARE EDUCATION/TRAINING PROGRAM

## 2023-11-09 PROCEDURE — 258N000003 HC RX IP 258 OP 636: Performed by: INTERNAL MEDICINE

## 2023-11-09 PROCEDURE — G0378 HOSPITAL OBSERVATION PER HR: HCPCS

## 2023-11-09 PROCEDURE — 250N000011 HC RX IP 250 OP 636: Performed by: INTERNAL MEDICINE

## 2023-11-09 RX ORDER — ONDANSETRON 4 MG/1
4 TABLET, ORALLY DISINTEGRATING ORAL EVERY 8 HOURS PRN
Qty: 25 TABLET | Refills: 0 | Status: SHIPPED | OUTPATIENT
Start: 2023-11-09

## 2023-11-09 RX ADMIN — METOCLOPRAMIDE 10 MG: 5 INJECTION, SOLUTION INTRAMUSCULAR; INTRAVENOUS at 09:10

## 2023-11-09 RX ADMIN — SODIUM CHLORIDE: 9 INJECTION, SOLUTION INTRAVENOUS at 05:00

## 2023-11-09 RX ADMIN — METOCLOPRAMIDE 10 MG: 5 INJECTION, SOLUTION INTRAMUSCULAR; INTRAVENOUS at 02:40

## 2023-11-09 RX ADMIN — PROCHLORPERAZINE EDISYLATE 10 MG: 5 INJECTION INTRAMUSCULAR; INTRAVENOUS at 05:00

## 2023-11-09 RX ADMIN — LEVETIRACETAM 1000 MG: 10 INJECTION INTRAVENOUS at 09:14

## 2023-11-09 RX ADMIN — SODIUM CHLORIDE: 9 INJECTION, SOLUTION INTRAVENOUS at 13:35

## 2023-11-09 ASSESSMENT — ACTIVITIES OF DAILY LIVING (ADL)
ADLS_ACUITY_SCORE: 31

## 2023-11-09 NOTE — PLAN OF CARE
Observation goals  PRIOR TO DISCHARGE       Comments:   Nausea/vomiting improved: Partially met     labs improved: Met     tolerating po: Not met. Emesis x4.       tachycardia resolved: Partially met, tachy at times.      Nurse to notify provider when observation goals have been met and patient is ready for discharge.

## 2023-11-09 NOTE — PLAN OF CARE
Observation goals  PRIOR TO DISCHARGE       Comments:   Nausea/vomiting improved: Partially met     labs improved: Met     tolerating po: Not met. Emesis x1      tachycardia resolved: Partially met, tachy at times.      Nurse to notify provider when observation goals have been met and patient is ready for discharge.

## 2023-11-09 NOTE — PROGRESS NOTES
PRIMARY Concern: Nausea and vomiting. Tachycardia.    SAFETY RISK Concerns (fall risk, behaviors, etc.): Fall      Isolation/Type: None  Tests/Procedures for NEXT shift: None  Consults? (Pending/following, signed-off?) None  Where is patient from? (Home, TCU, etc.): Home.  Other Important info for NEXT shift: Monitor Tachy  Anticipated DC date & active delays: TBD, pending clinical improvement.   ____________________________________________  SUMMARY NOTE:  Orientation/Cognitive: A/OX4  Observation Goals (Met/ Not Met): Not met  Mobility Level/Assist Equipment: SBA  Antibiotics & Plan (IV/po, length of tx left): na  Pain Management: Denies  Tele/VS/O2: Tachy, slightly elevated BP, other VSS on RA  ABNL Lab/BG: No labs today  Diet: Reg, not tolerating . Zofran given.   Bowel/Bladder: Cont  Skin Concerns: WDL  Drains/Devices: PIV infusing IVF as ordered.   Patient Stated Goal for Today: Feel better.

## 2023-11-09 NOTE — UTILIZATION REVIEW
Concurrent stay review; Secondary Review Determination    Under the authority of the Utilization Management Committee, the utilization review process indicated a secondary review on the above patient. The review outcome is based on review of the medical records, discussions with staff, and applying clinical experience noted on the date of the review.    (x) Observation Status Appropriate - Concurrent stay review    RATIONALE FOR DETERMINATION    Darya Hamilton is a 28 year old female with a history of Hodgkin's lymphoma, HFrEF, seizures, SVT, alcohol aubse who is admitted on 11/6/2023 with nausea and vomiting.  Evaluation has shown no clear cause for symptoms.  She is suspected to have gastroenteritis.  She is clinically improving.  She has required occasional Reglan for nausea.    Patient is clinically improving and there is no clear indication to change patient's status to inpatient. The severity of illness, intensity of service provided, expected LOS and risk for adverse outcome make the care appropriate for observation.    This document was produced using voice recognition software    The information on this document is developed by the utilization review team in order for the business office to ensure compliance. This only denotes the appropriateness of proper admission status and does not reflect the quality of care rendered.    The definitions of Inpatient Status and Observation Status used in making the determination above are those provided in the CMS Coverage Manual, Chapter 1 and Chapter 6, section 70.4.    Sincerely,    Navin Aviles MD     Utilization Review    Physician Advisor    [facility].

## 2023-11-09 NOTE — PROGRESS NOTES
Observation goals  PRIOR TO DISCHARGE       Comments:   Nausea/vomiting improved: Partially met     labs improved: Met     tolerating po: Not met      tachycardia resolved: Partially met, tachy at times.      Nurse to notify provider when observation goals have been met and patient is ready for discharge.

## 2023-11-09 NOTE — PLAN OF CARE
Goal Outcome Evaluation:    RIMARY Concern: Nausea and vomiting. Tachycardia.    SAFETY RISK Concerns (fall risk, behaviors, etc.): Fall      Isolation/Type: None  Tests/Procedures for NEXT shift: None  Consults? (Pending/following, signed-off?) None  Where is patient from? (Home, TCU, etc.): Home.  Other Important info for NEXT shift: Monitor Tachy  Anticipated DC date & active delays: TBD, pending clinical improvement.   ____________________________________________  SUMMARY NOTE:  Orientation/Cognitive: A/OX4  Observation Goals (Met/ Not Met): Not met  Mobility Level/Assist Equipment: Up ad arnel  Antibiotics & Plan (IV/po, length of tx left): na  Pain Management: Denies  Tele/VS/O2: Tachy at times, slightly elevated 's/90's, other VSS on RA. Tele NSR.   ABNL Lab/BG: No labs today  Diet: clear liquids, not tolerating . PO Compazine given x1 not effective per pt statement, IV Compazine and IV Reglan given x1.   Bowel/Bladder: Continent, no stool this shift. Voiding spontaneously.   Skin Concerns: WDL  Drains/Devices: PIV infusing NS @ 125 ml/hr.   Patient Stated Goal for Today:

## 2023-11-09 NOTE — PLAN OF CARE
Goal Outcome Evaluation:      Plan of Care Reviewed With: patient    Overall Patient Progress: improvingOverall Patient Progress: improving    PRIMARY Concern: N/V d/t gastroenteritis   Tests/Procedures for next shift: none   Safety Risk CONCERNS: none                  (fall risk, behaviors, etc.)  Where is patient from? (Home, TCU, etc.): home  Other Important info for next shift: none  Anticipated DC date & active delays: today in the afternoon  SUMMARY NOTE:  Orientation/Cognitive: A/Ox4   Observation Goals (Met/ Not Met): met   Mobility Level/Assist Equipment: ind   Pain Management: denies pain  Tele/VS/O2: VSS on room air, expt tachycardia, at HR 110s at rest, in the HR 150s w/ activity. Dr Moreno aware.    ABNL Lab/BG: WNL  Diet: clears, emesis x1 this shift   Bowel/Bladder: up to bathroom  Skin Concerns: none   Drains/Devices: IV removed   Patient Stated Goal for Today: to feel better

## 2023-11-09 NOTE — DISCHARGE SUMMARY
Discharge instructions given, aVS printed and signed. Pt is A/Ox4, VSS on room air. Ind. Tele ST. Denies chest pain, SOB or dizziness. Printed work note from Dr Moreno  given to pt. Discharge meds reviewed w/ pt. Pt discharged home from [address] via wheelchair ride. Pt scheduled uber ride home.

## 2023-11-09 NOTE — PROGRESS NOTES
[facility]    Medicine Progress Note - Hospitalist Service    Date of Admission:  11/6/2023  Date of Service: 11/09/2023    Assessment & Plan      Darya Hamilton is a 28 year old female with a history of Hodgkin's lymphoma, HFrEF, seizures, SVT, alcohol aubse who is admitted on 11/6/2023 with nausea and vomiting     Nausea and vomiting  Etiology for this is unclear but abdominal CT is negative for acute abnnormality. Lipase normal at 46. Patient denies substantial recent use of alcohol but alcohol withdrawal is on the differential. alcoholic gastritis and viral gastroenteritis possible. Keppra could cause nausea but other minimal meds are unlikely. Pregnancy could be on the differential. Note history this year of recurrent presentations to the ED with occasional admission to the hospital for recurrent, intractable nausea and vomiting.   -Symptoms improved with supportive cares for now with IVF and antiemetics  -pregnancy test negative  -Suspect this is secondary to gastroenteritis / gastritis / alcohol abuse     Mild metabolic acidosis with elevated anion gap.   Bicarb of 18, anion gap of 23. Suspect due to starvation ketosis and dehydration.      Seizure disorder  -continue keppra     Hepatic steatosis  Noted on CT. AST elevated to 88 suggesting ongoing alcohol use.   -lifestyle modification  -follow up with PCP     COVID-19  Diagnosed 10/25/23, symptoms improved. considered COVID recovered as of 11/4/23.      HFrEF  Most recent EF of 46% in October of 2020, improved from 20-25% in May of 2020. Doesn't appear to have any volume overload at this time. Last cardiology visit was in 2020 after which she was lost to follow up.  - Continue PTA BB     SVT  Per history. note tachycardia on presentation which is likely due in part to dehydration as well as stress response to acute illness.   Plan:  -continue metoprolol     Hodgkin's lymphoma  S/p chemotherapy per previous note in chart review  "though details about this are unclear.                 Observation Goals: Nausea/vomiting improved, labs improved, tolerating po, tachycardia resolved, Nurse to notify provider when observation goals have been met and patient is ready for discharge.  Diet: Advance Diet as Tolerated: Clear Liquid Diet  Diet    DVT Prophylaxis: Pneumatic Compression Devices  Vasques Catheter: Not present  Lines: None     Cardiac Monitoring: ACTIVE order. Indication: Tachyarrhythmias, acute (48 hours)  Code Status: Full Code      Clinically Significant Risk Factors Present on Admission                       # Overweight: Estimated body mass index is 26.57 kg/m  as calculated from the following:    Height as of this encounter: 1.6 m (5' 3\").    Weight as of this encounter: 68 kg (150 lb).              Disposition Plan      Expected Discharge Date: 11/09/2023,  3:00 PM      Discharge Comments: N/V  IV Keppra  alcohol abuse  supportive cares            Lucas Moreno MD  Hospitalist Service  [facility]  Securely message with [employer] (more info)  Text page via [employer] Paging/Directory   ______________________________________________________________________    Interval History     Feels improved  Some nausea still but overall better  No fevers  No new abdominal pain    Physical Exam   Vital Signs: Temp: 98.2  F (36.8  C) Temp src: Oral BP: 122/88 Pulse: 108   Resp: 18 SpO2: 98 % O2 Device: None (Room air)    Weight: 150 lbs 0 oz    Constitutional: awake, alert, cooperative, no apparent distress.   Respiratory: CTABL   Cardiovascular: RRR with no m/r/g   GI: Normal bowel sounds, soft, non-distended, non-tender.  Neurologic: Awake, alert, oriented to name, place and time. Shawn.  Neuropsychiatric: normal mood and affect  ----------------------------------------------------------------------------------------    Medical Decision Making       35 MINUTES SPENT BY ME on the date of service doing chart review, history, exam, " documentation & further activities per the note.      Data   ------------------------- PAST 24 HR DATA REVIEWED -----------------------------------------------        Imaging results reviewed over the past 24 hrs:   No results found for this or any previous visit (from the past 24 hour(s)).  ------------------------- ENCOUNTER LABS ----------------------------------------------------------------  Recent Labs   Lab 11/09/23  0235 11/08/23 2208 11/07/23  1855 11/07/23  0609 11/06/23 2112 11/06/23 2024   WBC  --   --   --  5.1  --  4.2   HGB  --   --   --  11.7  --  13.5   MCV  --   --   --  91  --  90   PLT  --   --   --  220  --  263   NA  --   --   --  142 142 140   POTASSIUM  --   --   --  3.9 3.8 5.1   CHLORIDE  --   --   --  100 101 96*   CO2  --   --   --  18* 18* 21*   BUN  --   --   --  15.1 16.2 16.5   CR  --   --   --  0.58 0.61 0.61   ANIONGAP  --   --   --  24* 23* 23*   DONOVAN  --   --   --  9.7 9.4 10.1*   GLC 96 91 97 159* 128* 123*   ALBUMIN  --   --   --  4.8 4.7 5.2   PROTTOTAL  --   --   --  8.1 8.0 8.9*   BILITOTAL  --   --   --  0.5 0.9 1.0   ALKPHOS  --   --   --  64 69  --    ALT  --   --   --  38 41  --    AST  --   --   --  75* 88*  --    LIPASE  --   --   --   --   --  46       Most Recent 3 CBC's:  Recent Labs   Lab Test 11/07/23  0609 11/06/23  2024 10/26/20  0948   WBC 5.1 4.2 4.2   HGB 11.7 13.5 13.4   MCV 91 90 99    263 445     Most Recent 3 BMP's:  Recent Labs   Lab Test 11/09/23  0235 11/08/23 2208 11/07/23  1855 11/07/23 0609 11/06/23 2112 11/06/23 2024   NA  --   --   --  142 142 140   POTASSIUM  --   --   --  3.9 3.8 5.1   CHLORIDE  --   --   --  100 101 96*   CO2  --   --   --  18* 18* 21*   BUN  --   --   --  15.1 16.2 16.5   CR  --   --   --  0.58 0.61 0.61   ANIONGAP  --   --   --  24* 23* 23*   DONOVAN  --   --   --  9.7 9.4 10.1*   GLC 96 91 97 159* 128* 123*     Most Recent 2 LFT's:  Recent Labs   Lab Test 11/07/23 0609 11/06/23 2112   AST 75* 88*   ALT 38 41    ALKPHOS 64 69   BILITOTAL 0.5 0.9     Most Recent 3 INR's:  Recent Labs   Lab Test 10/16/20  1416 05/03/20  0145   INR 0.96 1.26*     Most Recent 3 Troponin's:  Recent Labs   Lab Test 10/08/20  0911 10/07/20  2224 05/04/20  0535   TROPI <0.015 <0.015 0.445*     Most Recent 3 BNP's:  Recent Labs   Lab Test 10/07/20  2224   NTBNPI 16     Most Recent D-dimer:No lab results found.  Most Recent Cholesterol Panel:No lab results found.  Most Recent 6 Bacteria Isolates From Any Culture (See EPIC Reports for Culture Details):  Recent Labs   Lab Test 10/07/20  2347 10/07/20  2321 05/03/20  0821 05/03/20  0808   CULT No growth No growth No growth No growth     Most Recent Hemoglobin A1c:  Recent Labs   Lab Test 10/08/20  0513   A1C 4.5

## 2023-11-11 ENCOUNTER — PATIENT OUTREACH (OUTPATIENT)
Dept: CARE COORDINATION | Facility: CLINIC | Age: 28
End: 2023-11-11
Payer: COMMERCIAL

## 2023-11-11 NOTE — PROGRESS NOTES
Clinic Care Coordination Contact  [facility]: Post-Discharge Note  SITUATION                                                      Admission:    Admission Date: 11/07/23   Reason for Admission: Nausea and vomiting  Discharge:   Discharge Date: 11/09/23  Discharge Diagnosis: Nausea and vomiting    BACKGROUND                                                      Per hospital discharge summary and inpatient provider notes:  Darya Hamilton is a 28 year old female who has a history of Hodgkin's lymphoma, HFrEF, seizures, alcohol use who presents to the ED with nausea and vomiting. She was diagnosed with COVID on 10/25,  main symptoms being cough, runny nose, sore throat. Her symptoms all improved though she has had a runny nose the past couple days. her main complaint is of nausea and abdominal pain for the past 2 days. she notes nonbilous, non bloody emesis with generalized abdominal pain which is worse in the epigastric region. Food does not seem to make the pain worse. She has not had any diarrhea so far. no blood in her stool or urine. no chest pain, cough or shortness of breath. In the ED she was noted to be tachycardic which responded to IVF bolus. she had a CT of the abdomen which did not show any acute abnormalities but did show hepatic steatosis. She was had improvement in her nausea after droperidol and eventually got some ativan as well. When I saw her she was more somnolent. In review of care everywhere she has had multiple admissions and ED visits this year with similar complaints of recurrent, intractable nausea and vomiting which usually resolve with supportive cares. She also had an admission in May with alcohol withdrawal.      she has a history of alcohol use though tells me her last drink was 3 days ago when she had a gouple glasses of wine. she is not drinking regularly before that. she has had alcohol withdrawal in the past. she does have a seizure disorder and is taking her keppra. She  also has a history of HFrEF with most recent TTE in October 2020 showing EF of 46% which is improved from 20-25% in May of 2020. Patient does not recall the history of her cardiomyopathy. she denies any LE swelling or weight gain. No sick contacts. no chest pain or shortness of breath currently. she did not feel any palpitations. I do note a history of SVT.    ASSESSMENT           Discharge Assessment  How are you doing now that you are home?: lot better today and I am able to keep food down today  How are your symptoms? (Red Flag symptoms escalate to triage hotline per guidelines): Improved  Do you feel your condition is stable enough to be safe at home until your provider visit?: Yes  Does the patient have their discharge instructions? : Yes  Does the patient have questions regarding their discharge instructions? : No  Were you started on any new medications or were there changes to any of your previous medications? : Yes  Does the patient have all of their medications?: Yes  Do you have questions regarding any of your medications? : No  Do you have all of your needed medical supplies or equipment (DME)?  (i.e. oxygen tank, CPAP, cane, etc.): Yes  Discharge follow-up appointment scheduled within 14 calendar days? : No                  PLAN                                                      Outpatient Plan:   Follow-up and recommended labs and tests       Follow up with primary care provider, Physician No Ref-Primary, within 7   days for hospital follow- up.  No follow up labs or test are needed.         No future appointments.      For any urgent concerns, please contact our 24 hour nurse triage line: [phone] ([phone])         MONCHO Reynolds